# Patient Record
Sex: FEMALE | Race: WHITE | Employment: OTHER | URBAN - METROPOLITAN AREA
[De-identification: names, ages, dates, MRNs, and addresses within clinical notes are randomized per-mention and may not be internally consistent; named-entity substitution may affect disease eponyms.]

---

## 2017-01-09 ENCOUNTER — TELEPHONE (OUTPATIENT)
Dept: FAMILY MEDICINE CLINIC | Age: 62
End: 2017-01-09

## 2017-03-08 RX ORDER — VENLAFAXINE HYDROCHLORIDE 150 MG/1
CAPSULE, EXTENDED RELEASE ORAL
Qty: 180 CAPSULE | Refills: 1 | Status: SHIPPED | OUTPATIENT
Start: 2017-03-08 | End: 2017-09-27 | Stop reason: SDUPTHER

## 2017-04-06 RX ORDER — ATENOLOL 50 MG/1
TABLET ORAL
Qty: 135 TABLET | Refills: 1 | Status: SHIPPED | OUTPATIENT
Start: 2017-04-06 | End: 2017-09-27 | Stop reason: SDUPTHER

## 2017-04-06 RX ORDER — METFORMIN HYDROCHLORIDE 500 MG/1
TABLET, EXTENDED RELEASE ORAL
Qty: 360 TABLET | Refills: 1 | Status: SHIPPED | OUTPATIENT
Start: 2017-04-06 | End: 2017-09-27 | Stop reason: SDUPTHER

## 2017-04-12 LAB — FASTING: YES

## 2017-05-03 ENCOUNTER — INITIAL CONSULT (OUTPATIENT)
Dept: PULMONOLOGY | Age: 62
End: 2017-05-03

## 2017-05-03 VITALS
SYSTOLIC BLOOD PRESSURE: 122 MMHG | OXYGEN SATURATION: 97 % | DIASTOLIC BLOOD PRESSURE: 82 MMHG | HEART RATE: 76 BPM | BODY MASS INDEX: 25.91 KG/M2 | WEIGHT: 171 LBS | HEIGHT: 68 IN

## 2017-05-03 DIAGNOSIS — G47.419 PRIMARY NARCOLEPSY WITHOUT CATAPLEXY: Primary | ICD-10-CM

## 2017-05-03 PROCEDURE — 99203 OFFICE O/P NEW LOW 30 MIN: CPT | Performed by: INTERNAL MEDICINE

## 2017-05-03 RX ORDER — MODAFINIL 200 MG/1
200 TABLET ORAL 2 TIMES DAILY
Qty: 90 TABLET | Refills: 1 | Status: SHIPPED | OUTPATIENT
Start: 2017-05-03 | End: 2018-03-12

## 2017-05-03 RX ORDER — LEVALBUTEROL TARTRATE 45 UG/1
1-2 AEROSOL, METERED ORAL EVERY 4 HOURS PRN
COMMUNITY
End: 2019-07-19 | Stop reason: ALTCHOICE

## 2017-05-03 RX ORDER — MONTELUKAST SODIUM 10 MG/1
10 TABLET ORAL NIGHTLY
COMMUNITY
End: 2021-06-01 | Stop reason: SDUPTHER

## 2017-07-17 ENCOUNTER — HOSPITAL ENCOUNTER (OUTPATIENT)
Dept: PHYSICAL THERAPY | Age: 62
Setting detail: THERAPIES SERIES
Discharge: HOME | End: 2017-07-17
Payer: COMMERCIAL

## 2017-07-17 DIAGNOSIS — I10 ESSENTIAL (PRIMARY) HYPERTENSION: ICD-10-CM

## 2017-07-17 PROCEDURE — 97112 NEUROMUSCULAR REEDUCATION: CPT

## 2017-07-17 PROCEDURE — 97110 THERAPEUTIC EXERCISES: CPT

## 2017-07-17 ASSESSMENT — PAIN DESCRIPTION - ORIENTATION: ORIENTATION: LEFT

## 2017-07-17 ASSESSMENT — PAIN DESCRIPTION - LOCATION: LOCATION: ANKLE

## 2017-07-17 ASSESSMENT — PAIN DESCRIPTION - PAIN TYPE: TYPE: CHRONIC PAIN

## 2017-07-17 ASSESSMENT — PAIN SCALES - GENERAL: PAINLEVEL_OUTOF10: 3

## 2017-07-19 ENCOUNTER — HOSPITAL ENCOUNTER (OUTPATIENT)
Dept: PHYSICAL THERAPY | Age: 62
Setting detail: THERAPIES SERIES
Discharge: HOME | End: 2017-07-19
Payer: COMMERCIAL

## 2017-07-19 DIAGNOSIS — I10 ESSENTIAL (PRIMARY) HYPERTENSION: ICD-10-CM

## 2017-07-19 PROCEDURE — 97110 THERAPEUTIC EXERCISES: CPT

## 2017-07-25 ENCOUNTER — HOSPITAL ENCOUNTER (OUTPATIENT)
Dept: PHYSICAL THERAPY | Age: 62
Setting detail: THERAPIES SERIES
Discharge: HOME | End: 2017-07-25
Payer: COMMERCIAL

## 2017-07-25 DIAGNOSIS — I10 ESSENTIAL (PRIMARY) HYPERTENSION: ICD-10-CM

## 2017-07-25 PROCEDURE — 97110 THERAPEUTIC EXERCISES: CPT

## 2017-08-14 ENCOUNTER — HOSPITAL ENCOUNTER (OUTPATIENT)
Dept: PHYSICAL THERAPY | Age: 62
Setting detail: THERAPIES SERIES
Discharge: HOME | End: 2017-08-14
Payer: COMMERCIAL

## 2017-08-14 ENCOUNTER — APPOINTMENT (OUTPATIENT)
Dept: PHYSICAL THERAPY | Age: 62
End: 2017-08-14
Payer: COMMERCIAL

## 2017-08-14 PROCEDURE — 97110 THERAPEUTIC EXERCISES: CPT

## 2017-08-18 ENCOUNTER — APPOINTMENT (OUTPATIENT)
Dept: PHYSICAL THERAPY | Age: 62
End: 2017-08-18
Payer: COMMERCIAL

## 2017-09-27 ENCOUNTER — OFFICE VISIT (OUTPATIENT)
Dept: FAMILY MEDICINE CLINIC | Age: 62
End: 2017-09-27
Payer: COMMERCIAL

## 2017-09-27 VITALS
WEIGHT: 175.4 LBS | SYSTOLIC BLOOD PRESSURE: 125 MMHG | BODY MASS INDEX: 26.58 KG/M2 | HEART RATE: 72 BPM | OXYGEN SATURATION: 97 % | TEMPERATURE: 97.9 F | DIASTOLIC BLOOD PRESSURE: 74 MMHG | HEIGHT: 68 IN

## 2017-09-27 DIAGNOSIS — L25.9 CONTACT DERMATITIS, UNSPECIFIED CONTACT DERMATITIS TYPE, UNSPECIFIED TRIGGER: ICD-10-CM

## 2017-09-27 DIAGNOSIS — M79.7 FIBROMYALGIA: ICD-10-CM

## 2017-09-27 DIAGNOSIS — K21.9 GASTROESOPHAGEAL REFLUX DISEASE WITHOUT ESOPHAGITIS: ICD-10-CM

## 2017-09-27 DIAGNOSIS — J30.1 ALLERGIC RHINITIS DUE TO POLLEN, UNSPECIFIED RHINITIS SEASONALITY: ICD-10-CM

## 2017-09-27 DIAGNOSIS — F33.42 RECURRENT MAJOR DEPRESSIVE DISORDER, IN FULL REMISSION (HCC): Primary | ICD-10-CM

## 2017-09-27 DIAGNOSIS — I10 ESSENTIAL HYPERTENSION: ICD-10-CM

## 2017-09-27 DIAGNOSIS — E88.81 METABOLIC SYNDROME: ICD-10-CM

## 2017-09-27 LAB — HBA1C MFR BLD: NORMAL %

## 2017-09-27 PROCEDURE — 99214 OFFICE O/P EST MOD 30 MIN: CPT | Performed by: NURSE PRACTITIONER

## 2017-09-27 PROCEDURE — 83036 HEMOGLOBIN GLYCOSYLATED A1C: CPT | Performed by: NURSE PRACTITIONER

## 2017-09-27 RX ORDER — FLUTICASONE PROPIONATE 50 MCG
1 SPRAY, SUSPENSION (ML) NASAL DAILY
Qty: 3 BOTTLE | Refills: 1 | Status: SHIPPED | OUTPATIENT
Start: 2017-09-27 | End: 2018-09-17 | Stop reason: SDUPTHER

## 2017-09-27 RX ORDER — VENLAFAXINE HYDROCHLORIDE 150 MG/1
CAPSULE, EXTENDED RELEASE ORAL
Qty: 180 CAPSULE | Refills: 1 | Status: SHIPPED | OUTPATIENT
Start: 2017-09-27 | End: 2018-03-23 | Stop reason: SDUPTHER

## 2017-09-27 RX ORDER — TRIAMCINOLONE ACETONIDE 0.25 MG/ML
LOTION TOPICAL 2 TIMES DAILY
Qty: 1 BOTTLE | Refills: 0 | Status: SHIPPED | OUTPATIENT
Start: 2017-09-27 | End: 2017-10-23 | Stop reason: SDUPTHER

## 2017-09-27 RX ORDER — ATENOLOL 50 MG/1
TABLET ORAL
Qty: 135 TABLET | Refills: 1 | Status: SHIPPED | OUTPATIENT
Start: 2017-09-27 | End: 2018-03-23 | Stop reason: SDUPTHER

## 2017-09-27 RX ORDER — METFORMIN HYDROCHLORIDE 500 MG/1
TABLET, EXTENDED RELEASE ORAL
Qty: 360 TABLET | Refills: 1 | Status: SHIPPED | OUTPATIENT
Start: 2017-09-27 | End: 2018-03-23 | Stop reason: SDUPTHER

## 2017-09-27 RX ORDER — TRAMADOL HYDROCHLORIDE 50 MG/1
50 TABLET ORAL EVERY 8 HOURS PRN
Qty: 90 TABLET | Refills: 1 | Status: SHIPPED | OUTPATIENT
Start: 2017-09-27 | End: 2018-03-28 | Stop reason: SDUPTHER

## 2017-09-27 RX ORDER — OMEPRAZOLE 20 MG/1
40 CAPSULE, DELAYED RELEASE ORAL 2 TIMES DAILY
Qty: 60 CAPSULE | Refills: 1 | Status: SHIPPED | OUTPATIENT
Start: 2017-09-27 | End: 2018-03-28 | Stop reason: SDUPTHER

## 2017-09-27 RX ORDER — TIZANIDINE 4 MG/1
TABLET ORAL
Qty: 90 TABLET | Refills: 1 | Status: SHIPPED | OUTPATIENT
Start: 2017-09-27 | End: 2019-01-23 | Stop reason: SDUPTHER

## 2017-09-27 ASSESSMENT — ENCOUNTER SYMPTOMS
SHORTNESS OF BREATH: 0
CONSTIPATION: 0
EYE REDNESS: 0
RHINORRHEA: 0
ANAL BLEEDING: 0
NAUSEA: 0
COLOR CHANGE: 0
ABDOMINAL DISTENTION: 0
ABDOMINAL PAIN: 0
COUGH: 0
SORE THROAT: 0
BLOOD IN STOOL: 0
SINUS PRESSURE: 1
DIARRHEA: 0
EYE DISCHARGE: 0

## 2017-10-23 DIAGNOSIS — L25.9 CONTACT DERMATITIS, UNSPECIFIED CONTACT DERMATITIS TYPE, UNSPECIFIED TRIGGER: ICD-10-CM

## 2017-10-24 RX ORDER — TRIAMCINOLONE ACETONIDE 0.25 MG/ML
LOTION TOPICAL
Qty: 60 ML | Refills: 0 | Status: SHIPPED | OUTPATIENT
Start: 2017-10-24 | End: 2018-05-15 | Stop reason: ALTCHOICE

## 2017-11-03 ENCOUNTER — OFFICE VISIT (OUTPATIENT)
Dept: PULMONOLOGY | Age: 62
End: 2017-11-03
Payer: COMMERCIAL

## 2017-11-03 VITALS
RESPIRATION RATE: 16 BRPM | OXYGEN SATURATION: 97 % | WEIGHT: 179 LBS | HEART RATE: 86 BPM | BODY MASS INDEX: 27.13 KG/M2 | SYSTOLIC BLOOD PRESSURE: 114 MMHG | DIASTOLIC BLOOD PRESSURE: 74 MMHG

## 2017-11-03 DIAGNOSIS — G47.10 HYPERSOMNIA: Primary | ICD-10-CM

## 2017-11-03 DIAGNOSIS — G47.419 PRIMARY NARCOLEPSY WITHOUT CATAPLEXY: ICD-10-CM

## 2017-11-03 PROCEDURE — 99213 OFFICE O/P EST LOW 20 MIN: CPT | Performed by: INTERNAL MEDICINE

## 2017-11-03 NOTE — PROGRESS NOTES
Sleep Medicine clinic follow up note  [facility] Patient: Mena Owens  : 1955                                          Chief Complaint: Thad Friend is here today for a 6 mth f/u for narcolepsy.      [place]: Mena Owens is a 65 y.o. oldfemale came for follow up regarding her Narcolepsy with out cataplexy. She is currently on treatment with Provigil 200mg po BID. She denies any side effects with Provigil. She is still feels sleepy at her work especially in the afternoon with a strong urge to go to sleep. She is having difficulty staying awake at her work some time. If she is at home, she is taking naps for 1 to 2hours. She feels refreshed with short naps. But she feels sleepy if her naps are too long. She usually goes to bed at 11:00 PM and wakes up at  6:00 AM. Over the weekends her sleep schedule remain phase delayed. She admits to takes naps 2 to 3 times a week for 1 to 2hours. She quit taking Zanaflex on daily basis. She takes Zanaflex very rarely. She underwent base line PSG followed by MSLT test at [facility] sleep center on 4/13/15 and 4/14/15 respectively. She was diagnosed with Narcolepsy with out Cataplexy. She was started on Nuvigil first. How ever due to her insurance denial she was started on adderall. Patient developed palpitations with Adderall. Her Adderall was changed to Provigil 200mg PO bid. She take Provigil a tablet at 7Am and second one between 1 to 2 pm. She denies any side effects with Provigil and tolerating well. She lives alone at home. She denies history of choking and gasping sensation at night time. She admits to headaches in the morning. She denies dry mouth in the morning. She denies palpitations during night time or during nocturnal awakenings. Shedenies sweating during nocturnal awakenings. No family history of obstructive sleep apnea or Narcolepsy.        Review of Systems General/Constitutional: she gained 8 lbs of weight from the last visit with normal appetite. No fever or chills. HENT: Negative. Eyes: Negative. Upper respiratory tract: Frequent nasal stuffiness with no post nasal drip. She is using Flonase nasal spray. Lower respiratory tract/ lungs: No cough or sputum production. No hemoptysis. Cardiovascular: No palpitations or chest pain. Gastrointestinal: No nausea or vomiting. Neurological: No focal neurologiacal weakness. Extremities: No edema. Musculoskeletal: No complaints. Genitourinary: No complaints. Hematological: Negative. Psychiatric/Behavioral: Negative. Skin: No itching.            Past Medical History:   Diagnosis Date    Asthma     Dr. Leona De Santiago Depression       from [facility] neck    Fibromyalgia    [facility] Hyperlipemia     check per Dr Becky Perez Hyperlipidemia 2006    Insulin resistance     Narcolepsy 2000    per Dr. Zabrina Chakraborty via sleep study    Neck pain     cervical stenosis    Prolonged emergence from general anesthesia     Tachycardia        Past Surgical History:   Procedure Laterality Date    ANKLE SURGERY Left 2017    [facility]    CARPAL TUNNEL RELEASE Right 2011    CERVICAL DISCECTOMY  2012    C3-4, C4-5    COLONOSCOPY  2006    CORONARY ANGIOPLASTY WITH STENT PLACEMENT  2/12/15    Dr. Kati Sanchez  2015    FOOT SURGERY Right 2011    bone removal from foot on rt    FOOT SURGERY Left 2011    bone spur, tarsal tunnel and cyst removal on left    HYSTERECTOMY  1996    total, endometriosis    JOINT REPLACEMENT  2009    bilateral knee    LAPAROSCOPY      for endometriosis    TONSILLECTOMY AND ADENOIDECTOMY  2006    UPPER GASTROINTESTINAL ENDOSCOPY  2006       Allergies   Allergen Reactions    Bactrim Other (See Comments)     Metallic taste    Iv Dye [Iodides] Nausea Only and Other (See Comments)     Says older IVP dye causes Stomach pains    Tolectin [Tolmetin Sodium] Other (See Comments)     Metallic taste    Claforan [Cefotaxime Sodium In D5w] Hives and Rash    Pcn [Penicillins] Hives and Rash       Current Outpatient Prescriptions   Medication Sig Dispense Refill    triamcinolone (KENALOG) 0.025 % LOTN lotion APPLY TOPICALLY TWO TIMES A DAY 60 mL 0    atenolol (TENORMIN) 50 MG tablet TAKE ONE & ONE-HALF TABLETS BY MOUTH ONCE DAILY 135 tablet 1    fluticasone (FLONASE) 50 MCG/ACT nasal spray 1 spray by Nasal route daily 3 Bottle 1    metFORMIN (GLUCOPHAGE-XR) 500 MG extended release tablet TAKE FOUR TABLETS BY MOUTH ONCE DAILY IN THE EVENING 360 tablet 1    omeprazole (PRILOSEC) 20 MG delayed release capsule Take 2 capsules by mouth 2 times daily 60 capsule 1    tiZANidine (ZANAFLEX) 4 MG tablet Take 1 PO QHS as needed 90 tablet 1    traMADol (ULTRAM) 50 MG tablet Take 1 tablet by mouth every 8 hours as needed for Pain 90 tablet 1    venlafaxine (EFFEXOR XR) 150 MG extended release capsule TAKE ONE CAPSULE BY MOUTH TWICE A  capsule 1    montelukast (SINGULAIR) 10 MG tablet Take 10 mg by mouth nightly      levalbuterol (XOPENEX HFA) 45 MCG/ACT inhaler Inhale 1-2 puffs into the lungs every 4 hours as needed for Wheezing      modafinil (PROVIGIL) 200 MG tablet Take 1 tablet by mouth 2 times daily 90 tablet 1    aspirin 325 MG tablet Take 325 mg by mouth daily      atorvastatin (LIPITOR) 80 MG tablet Take 1 tablet by mouth daily. 30 tablet 3    nitroGLYCERIN (NITROSTAT) 0.4 MG SL tablet Place 1 tablet under the tongue every 5 minutes as needed for Chest pain. 25 tablet 3    losartan (COZAAR) 25 MG tablet Take 1 tablet by mouth daily. 30 tablet 3    Multiple Vitamins-Minerals (THERAPEUTIC MULTIVITAMIN-MINERALS) tablet Take 1 tablet by mouth daily.  Coenzyme Q10 (CO Q 10) 10 MG CAPS Take 1 capsule by mouth daily.  COCONUT OIL PO Take 2 capsules by mouth daily.       Mometasone Furo-Formoterol Fum (DULERA IN) Inhale  into the lungs 2 times daily.      Calcium Carbonate-Vitamin D (CALCIUM + D PO) Take 1,200 mg by mouth daily.  LYSINE by Does not apply route daily        No current facility-administered medications for this visit. Family History   Problem Relation Age of Onset    COPD Father     Heart Disease Father 61     ekg changes    Cancer Sister 50     Multiple malaran     Irritable Bowel Syndrome Sister     Arthritis Mother 48    COPD Maternal Aunt     Severe Sprains Maternal Uncle      epilepsy     Cancer Paternal Uncle 58     lung    Diabetes Maternal Grandmother     Kidney Disease Maternal Grandmother 72     Failure form DM     Cancer Maternal Grandfather      Mets all over     Coronary Art Dis Paternal Grandfather 35    Heart Disease Paternal Grandfather 35    Heart Attack Paternal Grandfather 35    Cancer Maternal Uncle      Lung     Stroke Maternal Uncle     Diabetes Maternal Uncle      NIDDM         /74   Pulse 86   Resp 16   Wt 179 lb (81.2 kg)   SpO2 97% Comment: R/A at rest  BMI 27.13 kg/m²   Mallampati airway Class:III  Neck Circumference: 14 Inche  [place] sleepiness score 11/3/17: 18    Physical Exam   Nursing note and vitals reviewed. Constitutional: Patient appears moderately built and moderately nourished. No distress. Patient is oriented to person, place, and time. HENT:   Head: Normocephalic and atraumatic. Right Ear: External ear normal.   Left Ear: External ear normal.   Mouth/Throat: Oropharynx is clear and moist.  No oral thrush. Eyes: Conjunctivae are normal. Pupils are equal, round, and reactive to light. No scleral icterus. Neck: Neck supple. No JVD present. No tracheal deviation present. Cardiovascular: Normal rate, regular rhythm, normal heart sounds. No murmur heard. Pulmonary/Chest: Effort normal and breath sounds normal. No stridor. No respiratory distress. No wheezes. No rales. Patient exhibits no tenderness. Abdominal: Soft. Patient exhibits no distension.

## 2017-11-03 NOTE — PROGRESS NOTES
Chief Complaint: Thad Friend is here today for a 6 mth f/u for narcolepsy.      Mallampati airway Class:III  Neck Circumference: 14 Inches    [place] sleepiness score 11/3/17: 18

## 2017-11-19 ENCOUNTER — HOSPITAL ENCOUNTER (OUTPATIENT)
Dept: SLEEP CENTER | Age: 62
Discharge: HOME | End: 2017-11-19
Payer: COMMERCIAL

## 2017-11-19 DIAGNOSIS — G47.10 HYPERSOMNIA: ICD-10-CM

## 2017-11-19 PROCEDURE — 95810 POLYSOM 6/> YRS 4/> PARAM: CPT

## 2017-11-21 LAB — STATUS: NORMAL

## 2017-11-22 NOTE — PROGRESS NOTES
[address] [place] [address]                                SLEEP STUDY REPORT    PATIENT NAME: Blas Essex              :        1955  MED REC NO:   [id number]                           ROOM:  ACCOUNT NO:   [de-identified]                           ADMIT DATE: 2017  PROVIDER:     Sydney Sanchez MD      DATE OF STUDY:  2017    REFERRING PROVIDER:  Sydney Sanchez M.D. The patient's height is 68 inches, weight is 179 pounds with a BMI of 27.5. HISTORY:  The patient is a 41-year-old female, who was recently evaluated  by me on 2017. The patient diagnosed with narcolepsy in the past.   Currently on treatment with Provigil 200 mg p.o. b.i.d. The patient gained  7 pounds of weight from her last sleep study. The patient still found to  have hypersomnia with an [place] Sleepiness Score of 18. The patient is  scheduled for a baseline polysomnogram to check the current status of sleep  apnea as an etiology for hypersomnia. The patient had associated  comorbidities including moderate bronchial asthma, depression, and  fibromyalgia. METHODS:  The patient underwent digital polysomnography in compliance with  the standards and specifications from the AASM Manual including the  simultaneous recording of 3 EEG channels (F4-M1, C4-M1, and O2-M1 with back  up electrodes F3-M2, C3-M2, and O1-M2), 2 EOG channels (E1-M2, and E2-M1,),  EMG (chin, left & right leg), EKG, Nonin pulse oximetry with  less than 2  second averaging time, body position, airflow recorded by oral-nasal  thermal sensor and nasal air pressure transducer, plus respiratory effort  recorded by calibrated respiratory inductance plethysmography (RIP), flow  volume loop, sound and video.   Sleep staging and scoring followed the  standard put forth by the American Academy of Sleep Medicine and utilized  the 4A obstructive hypopnea than 88%. EKG MONITORING:  Revealed normal sinus rhythm. The patient was found to have a mild-to-moderate snoring during the sleep  study. IMPRESSION:  1. The study is consistent with no clinically significant obstructive  sleep apnea; however, the patient did not have any REM sleep during the  study limiting the interpretation. 2.  Significant periodic limb movements with associated arousals. 3.  Depression. 4.  Fibromyalgia. 5.  Cervical stenosis. 6.  Narcolepsy without cataplexy, was diagnosed by Dr. Aquiles Walsh at [facility]. Current plan of treatment with Provigil 200 mg b.i.d. RECOMMENDATIONS:  The patient should be scheduled for followup with my  clinic as soon as possible to discuss about the sleep study findings for  further management.         Luis Enrique Laguna MD    D: 11/21/2017 20:25:30       T: 11/22/2017 5:52:00     [email or web address]  Job#: [id number]     Doc#: [id number]    CC:

## 2017-11-28 ENCOUNTER — OFFICE VISIT (OUTPATIENT)
Dept: PULMONOLOGY | Age: 62
End: 2017-11-28
Payer: COMMERCIAL

## 2017-11-28 VITALS
SYSTOLIC BLOOD PRESSURE: 124 MMHG | OXYGEN SATURATION: 96 % | WEIGHT: 177.8 LBS | HEIGHT: 68 IN | DIASTOLIC BLOOD PRESSURE: 78 MMHG | BODY MASS INDEX: 26.95 KG/M2 | HEART RATE: 71 BPM

## 2017-11-28 DIAGNOSIS — G47.419 PRIMARY NARCOLEPSY WITHOUT CATAPLEXY: Primary | ICD-10-CM

## 2017-11-28 DIAGNOSIS — G47.10 HYPERSOMNIA: ICD-10-CM

## 2017-11-28 DIAGNOSIS — G47.61 PLMD (PERIODIC LIMB MOVEMENT DISORDER): ICD-10-CM

## 2017-11-28 DIAGNOSIS — J01.10 ACUTE NON-RECURRENT FRONTAL SINUSITIS: ICD-10-CM

## 2017-11-28 PROCEDURE — 99214 OFFICE O/P EST MOD 30 MIN: CPT | Performed by: PHYSICIAN ASSISTANT

## 2017-11-28 RX ORDER — AZITHROMYCIN 250 MG/1
TABLET, FILM COATED ORAL
Qty: 1 PACKET | Refills: 0 | Status: SHIPPED | OUTPATIENT
Start: 2017-11-28 | End: 2017-12-08

## 2017-11-28 NOTE — PROGRESS NOTES
has a normal mood and affect. Assessment  1. Primary narcolepsy without cataplexy     2.  PLMD (periodic limb movement disorder)        Recommendations  Will start on Xyrem  She denies ETOH or drug abuse  Continue Provigil with Xyrem  Zpack for sinusitis  If no improvement in PLM, will consider treating also  Follow up 3 months    Tamy Hammond PA-C, MPAS  11/28/2017

## 2018-03-12 ENCOUNTER — TELEPHONE (OUTPATIENT)
Dept: PULMONOLOGY | Age: 63
End: 2018-03-12

## 2018-03-12 ENCOUNTER — OFFICE VISIT (OUTPATIENT)
Dept: PULMONOLOGY | Age: 63
End: 2018-03-12
Payer: COMMERCIAL

## 2018-03-12 VITALS
SYSTOLIC BLOOD PRESSURE: 108 MMHG | BODY MASS INDEX: 27.1 KG/M2 | WEIGHT: 178.8 LBS | OXYGEN SATURATION: 97 % | DIASTOLIC BLOOD PRESSURE: 68 MMHG | HEART RATE: 80 BPM | HEIGHT: 68 IN

## 2018-03-12 DIAGNOSIS — G47.419 PRIMARY NARCOLEPSY WITHOUT CATAPLEXY: Primary | ICD-10-CM

## 2018-03-12 PROCEDURE — 99213 OFFICE O/P EST LOW 20 MIN: CPT | Performed by: PHYSICIAN ASSISTANT

## 2018-03-12 RX ORDER — ARMODAFINIL 250 MG/1
250 TABLET ORAL ONCE
Qty: 30 TABLET | Refills: 0 | Status: SHIPPED | OUTPATIENT
Start: 2018-03-12 | End: 2018-03-12 | Stop reason: SDUPTHER

## 2018-03-12 RX ORDER — ARMODAFINIL 250 MG/1
250 TABLET ORAL DAILY
Qty: 30 TABLET | Refills: 0 | Status: SHIPPED | OUTPATIENT
Start: 2018-03-12 | End: 2018-04-13 | Stop reason: SDUPTHER

## 2018-03-23 DIAGNOSIS — E88.81 METABOLIC SYNDROME: ICD-10-CM

## 2018-03-23 DIAGNOSIS — F33.42 RECURRENT MAJOR DEPRESSIVE DISORDER, IN FULL REMISSION (HCC): ICD-10-CM

## 2018-03-23 DIAGNOSIS — I10 ESSENTIAL HYPERTENSION: ICD-10-CM

## 2018-03-23 RX ORDER — VENLAFAXINE HYDROCHLORIDE 150 MG/1
CAPSULE, EXTENDED RELEASE ORAL
Qty: 180 CAPSULE | Refills: 1 | Status: SHIPPED | OUTPATIENT
Start: 2018-03-23 | End: 2018-09-17 | Stop reason: SDUPTHER

## 2018-03-23 RX ORDER — METFORMIN HYDROCHLORIDE 500 MG/1
TABLET, EXTENDED RELEASE ORAL
Qty: 360 TABLET | Refills: 1 | Status: SHIPPED | OUTPATIENT
Start: 2018-03-23 | End: 2018-03-28 | Stop reason: SDUPTHER

## 2018-03-23 RX ORDER — ATENOLOL 50 MG/1
TABLET ORAL
Qty: 135 TABLET | Refills: 1 | Status: SHIPPED | OUTPATIENT
Start: 2018-03-23 | End: 2018-03-28 | Stop reason: SDUPTHER

## 2018-03-28 ENCOUNTER — OFFICE VISIT (OUTPATIENT)
Dept: FAMILY MEDICINE CLINIC | Age: 63
End: 2018-03-28
Payer: COMMERCIAL

## 2018-03-28 VITALS
HEART RATE: 83 BPM | RESPIRATION RATE: 12 BRPM | WEIGHT: 177 LBS | BODY MASS INDEX: 26.83 KG/M2 | HEIGHT: 68 IN | OXYGEN SATURATION: 92 % | SYSTOLIC BLOOD PRESSURE: 118 MMHG | DIASTOLIC BLOOD PRESSURE: 82 MMHG

## 2018-03-28 DIAGNOSIS — E88.81 METABOLIC SYNDROME: ICD-10-CM

## 2018-03-28 DIAGNOSIS — E78.00 HYPERCHOLESTEROLEMIA: ICD-10-CM

## 2018-03-28 DIAGNOSIS — M79.7 FIBROMYALGIA: ICD-10-CM

## 2018-03-28 DIAGNOSIS — K21.9 GASTROESOPHAGEAL REFLUX DISEASE WITHOUT ESOPHAGITIS: ICD-10-CM

## 2018-03-28 DIAGNOSIS — I10 ESSENTIAL HYPERTENSION: ICD-10-CM

## 2018-03-28 DIAGNOSIS — G43.009 MIGRAINE WITHOUT AURA AND WITHOUT STATUS MIGRAINOSUS, NOT INTRACTABLE: Primary | ICD-10-CM

## 2018-03-28 LAB — HBA1C MFR BLD: 6.1 %

## 2018-03-28 PROCEDURE — 83036 HEMOGLOBIN GLYCOSYLATED A1C: CPT | Performed by: FAMILY MEDICINE

## 2018-03-28 PROCEDURE — 99214 OFFICE O/P EST MOD 30 MIN: CPT | Performed by: FAMILY MEDICINE

## 2018-03-28 RX ORDER — TOPIRAMATE 25 MG/1
TABLET ORAL
Qty: 60 TABLET | Refills: 1 | Status: SHIPPED | OUTPATIENT
Start: 2018-03-28 | End: 2018-03-28 | Stop reason: CLARIF

## 2018-03-28 RX ORDER — ATENOLOL 50 MG/1
TABLET ORAL
Qty: 135 TABLET | Refills: 1 | Status: SHIPPED | OUTPATIENT
Start: 2018-03-28 | End: 2019-01-23 | Stop reason: SDUPTHER

## 2018-03-28 RX ORDER — OMEPRAZOLE 20 MG/1
40 CAPSULE, DELAYED RELEASE ORAL DAILY
Qty: 180 CAPSULE | Refills: 1 | Status: SHIPPED | OUTPATIENT
Start: 2018-03-28 | End: 2018-03-28 | Stop reason: CLARIF

## 2018-03-28 RX ORDER — OMEPRAZOLE 20 MG/1
CAPSULE, DELAYED RELEASE ORAL
Qty: 180 CAPSULE | Refills: 1 | Status: SHIPPED | OUTPATIENT
Start: 2018-03-28 | End: 2018-09-17 | Stop reason: SDUPTHER

## 2018-03-28 RX ORDER — METFORMIN HYDROCHLORIDE 500 MG/1
TABLET, EXTENDED RELEASE ORAL
Qty: 360 TABLET | Refills: 1 | Status: SHIPPED | OUTPATIENT
Start: 2018-03-28 | End: 2018-09-17 | Stop reason: SDUPTHER

## 2018-03-28 RX ORDER — METFORMIN HYDROCHLORIDE 500 MG/1
1000 TABLET, EXTENDED RELEASE ORAL 2 TIMES DAILY
Qty: 360 TABLET | Refills: 1 | Status: SHIPPED | OUTPATIENT
Start: 2018-03-28 | End: 2018-03-28 | Stop reason: CLARIF

## 2018-03-28 RX ORDER — ATENOLOL 50 MG/1
75 TABLET ORAL DAILY
Qty: 135 TABLET | Refills: 1 | Status: SHIPPED | OUTPATIENT
Start: 2018-03-28 | End: 2018-03-28 | Stop reason: CLARIF

## 2018-03-28 RX ORDER — TOPIRAMATE 25 MG/1
TABLET ORAL
Qty: 60 TABLET | Refills: 1 | Status: SHIPPED | OUTPATIENT
Start: 2018-03-28 | End: 2018-05-15 | Stop reason: SDUPTHER

## 2018-03-28 RX ORDER — TRAMADOL HYDROCHLORIDE 50 MG/1
50 TABLET ORAL EVERY 8 HOURS PRN
Qty: 90 TABLET | Refills: 1 | Status: SHIPPED | OUTPATIENT
Start: 2018-03-28 | End: 2018-06-26

## 2018-03-28 ASSESSMENT — PATIENT HEALTH QUESTIONNAIRE - PHQ9
SUM OF ALL RESPONSES TO PHQ QUESTIONS 1-9: 0
1. LITTLE INTEREST OR PLEASURE IN DOING THINGS: 0
2. FEELING DOWN, DEPRESSED OR HOPELESS: 0
SUM OF ALL RESPONSES TO PHQ9 QUESTIONS 1 & 2: 0

## 2018-03-28 NOTE — PROGRESS NOTES
symptoms. She was started on it for fibromyalgia. HTN:  She is taking Cozaar 25 mg 1 tablet PO daily, Tenormin 50 mg 1 1/2 tablet PO daily. She tries to follow low cholesterol diet. She had a stent placed in in 2015. She sees Dr. Armando Chavira. Metabolic Syndrome:  She is taking Glucophage  mg 4 tablets PO daily and denies any side effects from the medicine. She is trying to eat low cholesterol, low sodium diet. FMG: She is taking Effexor  mg 2 PO daily, Zanaflex  4 mg PO as needed (average 2 per week) and Tramadol as needed (last bottle lasted 6 months). Asthma:  She sees the allergist.  She also been diagnosed with allergic rhinitis even with her negative allergy test.   She is taking Singulair, Dulera and Flonase. Also Zyrtec PRN. Narcolepsy: she was diagnosed in . Her physician is Dr. Arabella Banks. She underwent base line PSG followed by MSLT test at [facility] on 4/13/15 and 4/14/15 respectively. She was diagnosed with Narcolepsy with out Cataplexy. Last visit at the [address] she was switched to [facility].     has a current medication list which includes the following prescription(s): topiramate, metformin, atenolol, omeprazole, tramadol, venlafaxine, armodafinil, triamcinolone, fluticasone, tizanidine, montelukast, levalbuterol, aspirin, atorvastatin, nitroglycerin, losartan, therapeutic multivitamin-minerals, co q 10, coconut oil, mometasone furo-formoterol fum, calcium citrate-vitamin d, and lysine.     Allergies   Allergen Reactions    Bactrim Other (See Comments)     Metallic taste    Iv Dye [Iodides] Nausea Only and Other (See Comments)     Says older IVP dye causes Stomach pains    Tolectin [Tolmetin Sodium] Other (See Comments)     Metallic taste    Claforan [Cefotaxime Sodium In D5w] Hives and Rash    Pcn [Penicillins] Hives and Rash       Past Medical History:   Diagnosis Date    Asthma     Dr. Chen He Depression

## 2018-04-12 ENCOUNTER — TELEPHONE (OUTPATIENT)
Dept: PULMONOLOGY | Age: 63
End: 2018-04-12

## 2018-04-13 RX ORDER — ARMODAFINIL 250 MG/1
250 TABLET ORAL DAILY
Qty: 30 TABLET | Refills: 0 | Status: SHIPPED | OUTPATIENT
Start: 2018-04-13 | End: 2018-05-14

## 2018-04-17 ENCOUNTER — HOSPITAL ENCOUNTER (OUTPATIENT)
Age: 63
Discharge: HOME | End: 2018-04-17
Payer: COMMERCIAL

## 2018-04-17 LAB
ALBUMIN SERPL-MCNC: 4.4 G/DL (ref 3.5–5.1)
ALP BLD-CCNC: 97 U/L (ref 38–126)
ALT SERPL-CCNC: 18 U/L (ref 11–66)
ANION GAP SERPL CALCULATED.3IONS-SCNC: 14 MEQ/L (ref 8–16)
AST SERPL-CCNC: 19 U/L (ref 5–40)
BILIRUB SERPL-MCNC: 0.3 MG/DL (ref 0.3–1.2)
BILIRUBIN DIRECT: < 0.2 MG/DL (ref 0–0.3)
BUN BLDV-MCNC: 15 MG/DL (ref 7–22)
CALCIUM SERPL-MCNC: 9.8 MG/DL (ref 8.5–10.5)
CHLORIDE BLD-SCNC: 105 MEQ/L (ref 98–111)
CHOLESTEROL, TOTAL: 177 MG/DL (ref 100–199)
CO2: 22 MEQ/L (ref 23–33)
CREAT SERPL-MCNC: 0.9 MG/DL (ref 0.4–1.2)
FERRITIN: 17 NG/ML (ref 10–291)
GFR SERPL CREATININE-BSD FRML MDRD: 63 ML/MIN/1.73M2
GLUCOSE BLD-MCNC: 102 MG/DL (ref 70–108)
HCT VFR BLD CALC: 42 % (ref 37–47)
HDLC SERPL-MCNC: 46 MG/DL
HEMOGLOBIN: 13.6 GM/DL (ref 12–16)
LDL CHOLESTEROL CALCULATED: 86 MG/DL
MCH RBC QN AUTO: 27.5 PG (ref 27–31)
MCHC RBC AUTO-ENTMCNC: 32.5 GM/DL (ref 33–37)
MCV RBC AUTO: 84.5 FL (ref 81–99)
PDW BLD-RTO: 15.1 % (ref 11.5–14.5)
PLATELET # BLD: 434 THOU/MM3 (ref 130–400)
PMV BLD AUTO: 8 FL (ref 7.4–10.4)
POTASSIUM SERPL-SCNC: 4.5 MEQ/L (ref 3.5–5.2)
RBC # BLD: 4.96 MILL/MM3 (ref 4.2–5.4)
SODIUM BLD-SCNC: 141 MEQ/L (ref 135–145)
TOTAL PROTEIN: 7.3 G/DL (ref 6.1–8)
TRIGL SERPL-MCNC: 225 MG/DL (ref 0–199)
VITAMIN B-12: 1170 PG/ML (ref 211–911)
WBC # BLD: 8 THOU/MM3 (ref 4.8–10.8)

## 2018-04-17 PROCEDURE — 80053 COMPREHEN METABOLIC PANEL: CPT

## 2018-04-17 PROCEDURE — 82728 ASSAY OF FERRITIN: CPT

## 2018-04-17 PROCEDURE — 36415 COLL VENOUS BLD VENIPUNCTURE: CPT

## 2018-04-17 PROCEDURE — 85027 COMPLETE CBC AUTOMATED: CPT

## 2018-04-17 PROCEDURE — 82248 BILIRUBIN DIRECT: CPT

## 2018-04-17 PROCEDURE — 82607 VITAMIN B-12: CPT

## 2018-04-17 PROCEDURE — 80061 LIPID PANEL: CPT

## 2018-05-15 ENCOUNTER — OFFICE VISIT (OUTPATIENT)
Dept: FAMILY MEDICINE CLINIC | Age: 63
End: 2018-05-15
Payer: COMMERCIAL

## 2018-05-15 VITALS
SYSTOLIC BLOOD PRESSURE: 120 MMHG | OXYGEN SATURATION: 98 % | HEIGHT: 68 IN | RESPIRATION RATE: 12 BRPM | HEART RATE: 77 BPM | BODY MASS INDEX: 27.28 KG/M2 | WEIGHT: 180 LBS | TEMPERATURE: 98 F | DIASTOLIC BLOOD PRESSURE: 82 MMHG

## 2018-05-15 DIAGNOSIS — Z23 NEED FOR PROPHYLACTIC VACCINATION AND INOCULATION AGAINST VARICELLA: ICD-10-CM

## 2018-05-15 DIAGNOSIS — G43.009 MIGRAINE WITHOUT AURA AND WITHOUT STATUS MIGRAINOSUS, NOT INTRACTABLE: ICD-10-CM

## 2018-05-15 DIAGNOSIS — L81.9 CHANGE IN PIGMENTED SKIN LESION OF FACE: ICD-10-CM

## 2018-05-15 DIAGNOSIS — J30.2 ACUTE SEASONAL ALLERGIC RHINITIS DUE TO OTHER ALLERGEN: Primary | ICD-10-CM

## 2018-05-15 PROCEDURE — 96372 THER/PROPH/DIAG INJ SC/IM: CPT | Performed by: FAMILY MEDICINE

## 2018-05-15 PROCEDURE — 99214 OFFICE O/P EST MOD 30 MIN: CPT | Performed by: FAMILY MEDICINE

## 2018-05-15 RX ORDER — METHYLPREDNISOLONE ACETATE 40 MG/ML
40 INJECTION, SUSPENSION INTRA-ARTICULAR; INTRALESIONAL; INTRAMUSCULAR; SOFT TISSUE ONCE
Status: COMPLETED | OUTPATIENT
Start: 2018-05-15 | End: 2018-05-15

## 2018-05-15 RX ORDER — TOPIRAMATE 25 MG/1
TABLET ORAL
Qty: 60 TABLET | Refills: 5 | Status: SHIPPED | OUTPATIENT
Start: 2018-05-15 | End: 2018-08-17 | Stop reason: SDUPTHER

## 2018-05-15 RX ORDER — BETAMETHASONE SODIUM PHOSPHATE AND BETAMETHASONE ACETATE 3; 3 MG/ML; MG/ML
6 INJECTION, SUSPENSION INTRA-ARTICULAR; INTRALESIONAL; INTRAMUSCULAR; SOFT TISSUE ONCE
Status: COMPLETED | OUTPATIENT
Start: 2018-05-15 | End: 2018-05-15

## 2018-05-15 RX ORDER — METHYLPREDNISOLONE SODIUM SUCCINATE 40 MG/ML
40 INJECTION, POWDER, LYOPHILIZED, FOR SOLUTION INTRAMUSCULAR; INTRAVENOUS ONCE
Status: DISCONTINUED | OUTPATIENT
Start: 2018-05-15 | End: 2018-05-17

## 2018-05-15 RX ADMIN — BETAMETHASONE SODIUM PHOSPHATE AND BETAMETHASONE ACETATE 6 MG: 3; 3 INJECTION, SUSPENSION INTRA-ARTICULAR; INTRALESIONAL; INTRAMUSCULAR; SOFT TISSUE at 10:23

## 2018-05-15 RX ADMIN — METHYLPREDNISOLONE ACETATE 40 MG: 40 INJECTION, SUSPENSION INTRA-ARTICULAR; INTRALESIONAL; INTRAMUSCULAR; SOFT TISSUE at 10:28

## 2018-05-25 RX ORDER — ARMODAFINIL 250 MG/1
TABLET ORAL
Qty: 30 TABLET | Refills: 0 | Status: SHIPPED | OUTPATIENT
Start: 2018-05-25 | End: 2018-06-23 | Stop reason: SDUPTHER

## 2018-08-17 DIAGNOSIS — G43.009 MIGRAINE WITHOUT AURA AND WITHOUT STATUS MIGRAINOSUS, NOT INTRACTABLE: ICD-10-CM

## 2018-08-17 RX ORDER — TOPIRAMATE 25 MG/1
TABLET ORAL
Qty: 60 TABLET | Refills: 5 | Status: SHIPPED | OUTPATIENT
Start: 2018-08-17 | End: 2018-09-17 | Stop reason: SDUPTHER

## 2018-09-12 ENCOUNTER — OFFICE VISIT (OUTPATIENT)
Dept: PULMONOLOGY | Age: 63
End: 2018-09-12
Payer: COMMERCIAL

## 2018-09-12 VITALS
BODY MASS INDEX: 26.64 KG/M2 | OXYGEN SATURATION: 98 % | SYSTOLIC BLOOD PRESSURE: 134 MMHG | WEIGHT: 175.8 LBS | HEIGHT: 68 IN | DIASTOLIC BLOOD PRESSURE: 72 MMHG | HEART RATE: 71 BPM

## 2018-09-12 DIAGNOSIS — G47.419 PRIMARY NARCOLEPSY WITHOUT CATAPLEXY: Primary | ICD-10-CM

## 2018-09-12 DIAGNOSIS — G47.10 HYPERSOMNIA: ICD-10-CM

## 2018-09-12 PROCEDURE — 99213 OFFICE O/P EST LOW 20 MIN: CPT | Performed by: PHYSICIAN ASSISTANT

## 2018-09-12 RX ORDER — METHYLPHENIDATE HYDROCHLORIDE 10 MG/1
10 TABLET ORAL DAILY
Qty: 30 TABLET | Refills: 0 | Status: SHIPPED | OUTPATIENT
Start: 2018-09-12 | End: 2019-09-12 | Stop reason: SDUPTHER

## 2018-09-12 RX ORDER — ARMODAFINIL 250 MG/1
250 TABLET ORAL DAILY
Qty: 90 TABLET | Refills: 0 | Status: SHIPPED | OUTPATIENT
Start: 2018-09-12 | End: 2018-09-17 | Stop reason: SDUPTHER

## 2018-09-12 NOTE — PROGRESS NOTES
endometriosis    TONSILLECTOMY AND ADENOIDECTOMY  2006    UPPER GASTROINTESTINAL ENDOSCOPY  2006       Social History   Substance Use Topics    Smoking status: Never Smoker    Smokeless tobacco: Never Used    Alcohol use Yes      Comment: wine cooler 1-2 times a year       Allergies   Allergen Reactions    Bactrim Other (See Comments)     Metallic taste    Iv Dye [Iodides] Nausea Only and Other (See Comments)     Says older IVP dye causes Stomach pains    Tolectin [Tolmetin Sodium] Other (See Comments)     Metallic taste    Claforan [Cefotaxime Sodium In D5w] Hives and Rash    Pcn [Penicillins] Hives and Rash       Current Outpatient Prescriptions   Medication Sig Dispense Refill    Armodafinil 250 MG TABS Take 250 mg by mouth daily for 90 days. . 90 tablet 0    methylphenidate (RITALIN) 10 MG tablet Take 1 tablet by mouth daily for 30 days. Take at noon. 30 tablet 0    topiramate (TOPAMAX) 25 MG tablet Take 1 tab PO every night x 1 week, then one tab PO BID x 1 week, then 1 tab in am and 2 in pm x 1 week then 2 tabs PO BID 60 tablet 5    metFORMIN (GLUCOPHAGE XR) 500 MG extended release tablet Take 2 tabs by mouth 2 times daily 360 tablet 1    atenolol (TENORMIN) 50 MG tablet Take 1.5 tablets by mouth daily 135 tablet 1    omeprazole (PRILOSEC) 20 MG delayed release capsule Take 2 capsules by mouth daily 180 capsule 1    venlafaxine (EFFEXOR XR) 150 MG extended release capsule TAKE ONE CAPSULE BY MOUTH TWICE A  capsule 1    fluticasone (FLONASE) 50 MCG/ACT nasal spray 1 spray by Nasal route daily 3 Bottle 1    tiZANidine (ZANAFLEX) 4 MG tablet Take 1 PO QHS as needed 90 tablet 1    montelukast (SINGULAIR) 10 MG tablet Take 10 mg by mouth nightly      levalbuterol (XOPENEX HFA) 45 MCG/ACT inhaler Inhale 1-2 puffs into the lungs every 4 hours as needed for Wheezing      aspirin 325 MG tablet Take 325 mg by mouth daily      atorvastatin (LIPITOR) 80 MG tablet Take 1 tablet by mouth daily. sleep symptoms.   - She was instructed on weight loss  - Alessandra Lauren was educated about my impression and plan. Patient verbalizes understanding.   We will see Zheng Fitch back in: 1 year     Mariann Calderon [place]  9/12/2018

## 2018-09-17 ENCOUNTER — OFFICE VISIT (OUTPATIENT)
Dept: FAMILY MEDICINE CLINIC | Age: 63
End: 2018-09-17
Payer: COMMERCIAL

## 2018-09-17 VITALS
BODY MASS INDEX: 26.07 KG/M2 | SYSTOLIC BLOOD PRESSURE: 106 MMHG | HEIGHT: 68 IN | DIASTOLIC BLOOD PRESSURE: 64 MMHG | WEIGHT: 172 LBS | TEMPERATURE: 98.2 F | OXYGEN SATURATION: 98 % | HEART RATE: 81 BPM

## 2018-09-17 DIAGNOSIS — Z00.00 WELL ADULT EXAM: Primary | ICD-10-CM

## 2018-09-17 DIAGNOSIS — G47.10 HYPERSOMNIA: ICD-10-CM

## 2018-09-17 DIAGNOSIS — G47.419 PRIMARY NARCOLEPSY WITHOUT CATAPLEXY: ICD-10-CM

## 2018-09-17 DIAGNOSIS — Z13.0 SCREENING FOR DEFICIENCY ANEMIA: ICD-10-CM

## 2018-09-17 DIAGNOSIS — Z12.31 ENCOUNTER FOR SCREENING MAMMOGRAM FOR BREAST CANCER: ICD-10-CM

## 2018-09-17 DIAGNOSIS — Z13.1 DIABETES MELLITUS SCREENING: ICD-10-CM

## 2018-09-17 DIAGNOSIS — Z11.59 ENCOUNTER FOR HEPATITIS C SCREENING TEST FOR LOW RISK PATIENT: ICD-10-CM

## 2018-09-17 DIAGNOSIS — Z11.4 ENCOUNTER FOR SCREENING FOR HIV: ICD-10-CM

## 2018-09-17 PROCEDURE — 99396 PREV VISIT EST AGE 40-64: CPT | Performed by: FAMILY MEDICINE

## 2018-09-17 PROCEDURE — 90471 IMMUNIZATION ADMIN: CPT | Performed by: FAMILY MEDICINE

## 2018-09-17 PROCEDURE — 90472 IMMUNIZATION ADMIN EACH ADD: CPT | Performed by: FAMILY MEDICINE

## 2018-09-17 PROCEDURE — 90688 IIV4 VACCINE SPLT 0.5 ML IM: CPT | Performed by: FAMILY MEDICINE

## 2018-09-17 PROCEDURE — 90715 TDAP VACCINE 7 YRS/> IM: CPT | Performed by: FAMILY MEDICINE

## 2018-09-17 RX ORDER — TIZANIDINE 4 MG/1
TABLET ORAL
Qty: 90 TABLET | Refills: 1 | Status: CANCELLED | OUTPATIENT
Start: 2018-09-17

## 2018-09-17 RX ORDER — VENLAFAXINE HYDROCHLORIDE 150 MG/1
CAPSULE, EXTENDED RELEASE ORAL
Qty: 180 CAPSULE | Refills: 1 | Status: SHIPPED | OUTPATIENT
Start: 2018-09-17 | End: 2019-01-23 | Stop reason: SDUPTHER

## 2018-09-17 RX ORDER — ARMODAFINIL 250 MG/1
250 TABLET ORAL DAILY
Qty: 90 TABLET | Refills: 0 | Status: SHIPPED | OUTPATIENT
Start: 2018-09-17 | End: 2018-09-18 | Stop reason: SDUPTHER

## 2018-09-17 RX ORDER — OMEPRAZOLE 20 MG/1
CAPSULE, DELAYED RELEASE ORAL
Qty: 180 CAPSULE | Refills: 1 | Status: SHIPPED | OUTPATIENT
Start: 2018-09-17 | End: 2019-01-23 | Stop reason: SDUPTHER

## 2018-09-17 RX ORDER — ATENOLOL 50 MG/1
TABLET ORAL
Qty: 135 TABLET | Refills: 1 | Status: CANCELLED | OUTPATIENT
Start: 2018-09-17

## 2018-09-17 RX ORDER — METFORMIN HYDROCHLORIDE 500 MG/1
TABLET, EXTENDED RELEASE ORAL
Qty: 360 TABLET | Refills: 1 | Status: SHIPPED | OUTPATIENT
Start: 2018-09-17 | End: 2019-01-23 | Stop reason: SDUPTHER

## 2018-09-17 RX ORDER — FLUTICASONE PROPIONATE 50 MCG
1 SPRAY, SUSPENSION (ML) NASAL DAILY
Qty: 3 BOTTLE | Refills: 1 | Status: SHIPPED | OUTPATIENT
Start: 2018-09-17 | End: 2019-01-23 | Stop reason: SDUPTHER

## 2018-09-17 RX ORDER — TOPIRAMATE 25 MG/1
TABLET ORAL
Qty: 60 TABLET | Refills: 5 | Status: SHIPPED | OUTPATIENT
Start: 2018-09-17 | End: 2018-12-26 | Stop reason: SDUPTHER

## 2018-09-17 ASSESSMENT — ENCOUNTER SYMPTOMS
CONSTIPATION: 0
DIARRHEA: 1
FACIAL SWELLING: 0
EYE ITCHING: 1
PHOTOPHOBIA: 0
WHEEZING: 0
EYE PAIN: 0
COLOR CHANGE: 0
ABDOMINAL DISTENTION: 0
SINUS PRESSURE: 0
STRIDOR: 0
APNEA: 0
VOICE CHANGE: 0
CHOKING: 0
RHINORRHEA: 0
BLOOD IN STOOL: 0
NAUSEA: 1
SHORTNESS OF BREATH: 0
SINUS PAIN: 0
ANAL BLEEDING: 0
CHEST TIGHTNESS: 0
ABDOMINAL PAIN: 0
COUGH: 0
SORE THROAT: 0
TROUBLE SWALLOWING: 0
BACK PAIN: 0
VOMITING: 0
EYE REDNESS: 0
EYE DISCHARGE: 0
RECTAL PAIN: 0

## 2018-09-17 NOTE — PATIENT INSTRUCTIONS
You may receive a survey about your visit with us today. The feedback from our patients helps us identify what is working well and where the service to all patients can be enhanced. Thank you! Patient Education        Learning About Breast Cancer Screening  What is breast cancer screening? Breast cancer occurs when cells that are not normal grow in one or both of your breasts. Screening tests can help find breast cancer early. Cancer is easier to treat when it's found early. Having concerns about breast cancer is common. That's why it's important to talk with your doctor about when to start and how often to get screened for breast cancer. How is breast cancer screening done? Several screening tests can be used to check for breast cancer. · Mammograms check for signs of cancer using X-rays. They can show tumors that are too small for you or your doctor to feel. During a mammogram, a machine squeezes your breasts to make them flatter and easier to X-ray. At least two pictures are taken of each breast. One is taken from the top and one from the side. · 3-D mammograms are also called digital breast tomosynthesis. Your breast is positioned on a flat plate. A top plate is pressed against your breast to keep it in position. The X-ray arm then moves in an arc above the breast and takes many pictures. A computer uses these X-rays to create a three-dimensional image. · Clinical breast exams are a doctor's exam. Your doctor carefully feels your breasts and under your arms to check for lumps or other changes. After the screening, your doctor will tell you the results. You will also be told if you need any follow-up tests. When should you get screened? Talk with your doctor about when you should start being tested for breast cancer. How often you get tested and the kind of tests you get will depend on your age and your risk. The guidelines that follow are for women who have an average risk for breast cancer.  If you have a higher risk for breast cancer, such as having a family history of breast cancer in multiple relatives or at a young age, your doctor may recommend different screening for you. · Ages 21 to 44: Some experts recommend that women have a clinical breast exam every 3 years, starting at age 21. Ask your doctor how often you should have this test. If you have a high risk for breast cancer, talk with your doctor about when to start yearly mammograms and other screening tests. · Ages 36 and older: Talk with your doctor about how often you should have mammograms and clinical breast exams. What is your risk for breast cancer? If you don't already know your risk of breast cancer, you can ask your doctor about it. You can also look it up at www.cancer.gov/bcrisktool/. If your doctor says that you have a high or very high risk, ask about ways to reduce your risk. These could include getting extra screening, taking medicine, or having surgery. If you have a strong family history of breast cancer, ask your doctor about genetic testing. What steps can you take to stay healthy? Some things that increase your risk of breast cancer, such as your age and being female, cannot be controlled. But you can do some things to stay as healthy as you can. · Learn what your breasts normally look and feel like. If you notice any changes, tell your doctor. · Drink alcohol wisely. Your risk goes up the more you drink. For the best health, women should have no more than 1 drink a day or 7 drinks a week. · If you smoke, quit. When you quit smoking, you lower your chances of getting many types of cancer. You can also do your best to eat well, be active, and stay at a healthy weight. Eating healthy foods and being active every day, as well as staying at a healthy weight, may help prevent cancer. Where can you learn more? Go to https://jennifer.health-partners. org and sign in to your VidAngel account.  Enter A556 in the Search healthy weight. This will lower your risk for many problems, such as obesity, diabetes, heart disease, and high blood pressure. · Get at least 30 minutes of exercise on most days of the week. Walking is a good choice. You also may want to do other activities, such as running, swimming, cycling, or playing tennis or team sports. · Do not smoke. Smoking can make health problems worse. If you need help quitting, talk to your doctor about stop-smoking programs and medicines. These can increase your chances of quitting for good. · Protect your skin from too much sun. When you're outdoors from 10 a.m. to 4 p.m., stay in the shade or cover up with clothing and a hat with a wide brim. Wear sunglasses that block UV rays. Even when it's cloudy, put broad-spectrum sunscreen (SPF 30 or higher) on any exposed skin. · See a dentist one or two times a year for checkups and to have your teeth cleaned. · Wear a seat belt in the car. · Limit alcohol to 1 drink a day. Too much alcohol can cause health problems. Follow your doctor's advice about when to have certain tests. These tests can spot problems early. · Cholesterol. Your doctor will tell you how often to have this done based on your age, family history, or other things that can increase your risk for heart attack and stroke. · Blood pressure. Have your blood pressure checked during a routine doctor visit. Your doctor will tell you how often to check your blood pressure based on your age, your blood pressure results, and other factors. · Mammogram. Ask your doctor how often you should have a mammogram, which is an X-ray of your breasts. A mammogram can spot breast cancer before it can be felt and when it is easiest to treat. · Pap test and pelvic exam. Ask your doctor how often you should have a Pap test. You may not need to have a Pap test as often as you used to. · Vision. Have your eyes checked every year or two or as often as your doctor suggests.  Some experts under license by [facility] ([facility]). If you have questions about a medical condition or this instruction, always ask your healthcare professional. [address] any warranty or liability for your use of this information. Patient Education        Learning About Calcium  What is calcium? Calcium keeps your bones and muscles-including your heart-healthy and strong. Your body needs vitamin D to absorb calcium. People who don't get enough calcium and vitamin D throughout life have an increased chance of having thin and brittle bones (osteoporosis) in their later years. Thin and brittle bones break easily. They can lead to serious injuries. This is why it's important for you to get enough calcium and vitamin D as a child and as an adult. It helps keep your bones strong as you get older. And it protects you against possible breaks. Your body also uses vitamin D to help your muscles absorb calcium and work well. If your muscles don't get enough calcium, then they can cramp, hurt, or feel weak. You may have long-term (chronic) muscle aches and pains. How much calcium do you need? How much calcium you need each day changes as you age. The [employer] of Medicine recommends the following amounts of calcium each day. · Ages 1 to 3 years: 700 milligrams  · Ages 4 to 8 years: 1,000 milligrams  · Ages 5 to 25 years: 1,300 milligrams  · Ages 23 to 48 years: 1,000 milligrams  · Males 46 to 79 years: 1,000 milligrams  · Females 46 to 79 years: 1,200 milligrams  · Ages 70 and older: 1,200 milligrams  Women who are pregnant or breastfeeding need the same amount of calcium and vitamin D as other women their age. How can you get enough calcium? Calcium is in foods such as milk, cheese, and yogurt. Vegetables like broccoli, kale, and Chinese cabbage also have it. You can get calcium if you eat the soft edible bones in canned sardines and canned salmon.  Foods with added (fortified) calcium include some cereals, break. And do some stretching before you do tasks that involve bending or lifting. · Be smart with sports and other activities that take a lot of energy. Warm up before these activities. Afterwards, cool down and stretch your muscles. Try to avoid training too much. That can lead to problems like tennis elbow, which is a tendon injury. Also not using the right technique for an activity can cause problems. Baseball players and weight lifters may injure their shoulders if they use the wrong movements. Follow-up care is a key part of your treatment and safety. Be sure to make and go to all appointments, and call your doctor if you are having problems. It's also a good idea to know your test results and keep a list of the medicines you take. Where can you learn more? Go to https://Homeloc.Certify. org and sign in to your Everdream account. Enter F741 in the Flexenclosure box to learn more about \"Learning About Ergonomics. \"     If you do not have an account, please click on the \"Sign Up Now\" link. Current as of: November 29, 2017  Content Version: 11.7  © 1771-2949 [place] [employer]. Care instructions adapted under license by [facility] ([facility]). If you have questions about a medical condition or this instruction, always ask your healthcare professional. [address] any warranty or liability for your use of this information. Patient Education        Work-Life Balance: Care Instructions  Your Care Instructions    Do you ever feel like there is not enough time to do all of the things you have to do, and no time at all for the things you enjoy? If so, you are not alone. On average, people in the [place] have worked more and more hours each year since 1970. But in recent years, fewer people say they want to take on more at work, even if they would get promoted or get paid more money.  More and more workers say they want time to spend with their families and to do things that are important to them. Do you ever feel:  · That you always have more and more work to do at your job? · That too many people depend on you every day? · That you never have enough time for your family or friends? · That you never have time for hobbies or things you enjoy? · That each second of your day is scheduled? If you answered \"yes\" to any of these questions, take steps at work and at home to get your life into balance. Follow-up care is a key part of your treatment and safety. Be sure to make and go to all appointments, and call your doctor if you are having problems. How can you care for yourself at home? Manage your time  · Focus on the important things. Taking on too much can wear you out. Look at how you spend your time, and redirect your focus. Learn to say \"no\" and let go of things that do not matter. · Set one small goal at a time. Use a day planner. Break large projects into smaller ones. · Ask for help. Let your children, your spouse, your coworkers, and other people in your life help you get things done. · Leave your job at the office. If you give up free time to get more work done, you may pay for it with stress. If your job offers a flexible work schedule, use it to fit your own work style. For instance, come in earlier to have a longer lunch break, or make time for a yoga class or workout during your workday. · Unplug. Do not let technology (such as your cell phone or the Internet) erase the line between your time and your employer's time. Lower job stress  Job stress causes trouble at work and at home. At work, you may worry about things you have not had time to do at home. At home, you may worry about your job. This cycle upsets your work-life balance. Lowering your job stress can get your life back in balance. Job stress can be caused by:  · Pressure and deadlines. · Heavy workloads or long hours. · Not being allowed to make decisions.   · Health and safety hazards. · Feeling you may lose your job. · Unclear or changing job duties. · Too much responsibility. · Work that is very tiring or boring. Do any of these things bother you? Consider talking with your boss to change things. There are some things that you may not be able to control. But even a few small changes might help lower your stress. Take advantage of programs at work  Businesses make money and are better off in other ways if their employees are healthy and happy. For this reason, many companies have programs to help balance work life and home life. These programs may include:  · Flexible schedules and hours. · Time off for family reasons, education, or community service. · Being able to work from home. · Employee assistance programs to provide counseling. · Child-care programs. Check to see if your company has any of these or other programs that could help you. If not, consider talking to your boss about why work-life balance programs make good business sense. Even if your company does not start an official program, you may be able to get flexible hours, time off, or the ability to do some work from home. Know when to quit  If you are truly unhappy because of a stressful job, and if the suggestions here have not worked, it may be time to think about changing jobs or changing careers. But before you quit, take time to research your options. Where can you learn more? Go to https://DX Urgent Carenickeb.Dashbid. org and sign in to your [employer] account. Enter G996 in the Like.fm box to learn more about \"Work-Life Balance: Care Instructions. \"     If you do not have an account, please click on the \"Sign Up Now\" link. Current as of: October 10, 2017  Content Version: 11.7  © 1490-3104 [employer]. Care instructions adapted under license by [facility] ([facility]).  If you have questions about a medical condition or this instruction, always ask your healthcare professional. [employer] disclaims any warranty or liability for your use of this information.

## 2018-09-17 NOTE — TELEPHONE ENCOUNTER
Cost of meds. Needs New RX hard copy for Good RX to get it cheaper at [facility]. This is a [facility] patient, You have seen her before.  Devora Crawley out all week

## 2018-09-17 NOTE — PROGRESS NOTES
tremors, seizures, syncope, facial asymmetry, speech difficulty, weakness, light-headedness, numbness and headaches. Hematological: Negative for adenopathy. Bruises/bleeds easily. Psychiatric/Behavioral: Negative for agitation, behavioral problems, confusion, decreased concentration, dysphoric mood, hallucinations, self-injury, sleep disturbance and suicidal ideas. The patient is not nervous/anxious and is not hyperactive. Health Habits: With regard to her health habits, she eats 2 meals and 2 snacks per day. She does not exercise regularly. She does take over the counter vitamins. She never had a blood transfusion or tattoo. She wears seatbelts while riding a car. She does not text or talk on the phone while driving. She performs all of her ADL's without problem. She is independent, she cooks, drives, bathes, and gets dressed without assistance. She is not . She has 4 children. She does work. She works 30-40 hours a week. She is happy with her life. She rates her stress level a 6 in a scale 1-10. Health Maintenance   Topic Date Due    DTaP/Tdap/Td vaccine (1 - Tdap) 1975    Shingles Vaccine (1 of 2 - 2 Dose Series) 2006    Flu vaccine (1) 2018    Breast cancer screen  2018    A1C test (Diabetic or Prediabetic)  2019    Lipid screen  2023    Colon cancer screen colonoscopy  2026    Hepatitis C screen  Completed    HIV screen  Completed       She  reports that she has never smoked. She has never used smokeless tobacco. She reports that she drinks alcohol. She reports that she does not use drugs. She does perform regular breast self exams. PROVIDER NOTES     HPI:  Deb Wolfe is a 59y.o. year old female, who comes today for an annual wellness visit.       Past Medical History:   Diagnosis Date    Asthma     Dr. Doc Wynne Depression 2000      from [facility] neck    Fibromyalgia    Estella Ríos Hyperlipemia     check gallops  Abdomen - soft, nontender, nondistended, no masses or organomegaly  Neurological -  oriented, normal speech, no focal findings or movement disorder noted  Extremities - peripheral pulses normal, no pedal edema, no clubbing or cyanosis  Skin - normal coloration and turgor, no rashes, no suspicious skin lesions noted    Assessment:   Diagnosis Orders   1. Well adult exam     2. Encounter for screening mammogram for breast cancer  BONNIE DIGITAL SCREEN W CAD BILATERAL   3. Encounter for hepatitis C screening test for low risk patient  Hepatitis C Antibody   4. Encounter for screening for HIV  HIV Screen   5. Diabetes mellitus screening  Glucose, Fasting   6. Screening for deficiency anemia  CBC       Plan:    Return in about 4 months (around 1/17/2019). .    Counseling was provided today regarding the following topics:  Healthy eating habits and snacks, talking or texting while driving, vitamin/Mineral supplementation, vitamin D and calcium intake, regular exercise, and breast self exam. Written information has been provided.          Vero Osei MD

## 2018-09-18 DIAGNOSIS — G47.419 PRIMARY NARCOLEPSY WITHOUT CATAPLEXY: ICD-10-CM

## 2018-09-18 DIAGNOSIS — G47.10 HYPERSOMNIA: ICD-10-CM

## 2018-09-18 RX ORDER — ARMODAFINIL 250 MG/1
250 TABLET ORAL DAILY
Qty: 90 TABLET | Refills: 0 | Status: SHIPPED | OUTPATIENT
Start: 2018-09-18 | End: 2019-04-10 | Stop reason: SDUPTHER

## 2018-09-18 NOTE — TELEPHONE ENCOUNTER
Dr Donnelly Door can you resend this to [facility] please I called [facility] to cancel there

## 2018-09-22 DIAGNOSIS — E88.81 METABOLIC SYNDROME: ICD-10-CM

## 2018-09-22 DIAGNOSIS — K21.9 GASTROESOPHAGEAL REFLUX DISEASE WITHOUT ESOPHAGITIS: ICD-10-CM

## 2018-09-22 DIAGNOSIS — F33.42 RECURRENT MAJOR DEPRESSIVE DISORDER, IN FULL REMISSION (HCC): ICD-10-CM

## 2018-09-24 NOTE — TELEPHONE ENCOUNTER
Last visit- 9/17/2018  Next visit- 1/17/2019    Requested Prescriptions     Pending Prescriptions Disp Refills    omeprazole (PRILOSEC) 20 MG delayed release capsule [Pharmacy Med Name: OMEPRAZOLE 20MG CPDR] 180 capsule 1     Sig: TAKE 2 CAPSULES BY MOUTH DAILY.

## 2018-09-24 NOTE — TELEPHONE ENCOUNTER
Last visit- 9/17/2018  Next visit- 1/17/2019    Requested Prescriptions     Pending Prescriptions Disp Refills    metFORMIN (GLUCOPHAGE-XR) 500 MG extended release tablet [Pharmacy Med Name: METFORMIN HCL ER 500MG TB24] 360 tablet 1     Sig: TAKE 4 TABLETS BY MOUTH ONCE DAILY EVERY EVENING.  venlafaxine (EFFEXOR XR) 150 MG extended release capsule [Pharmacy Med Name: VENLAFAXINE HCL ER 150MG CP24] 180 capsule 1     Sig: TAKE 1 CAPSULE BY MOUTH TWICE DAILY.

## 2018-09-25 RX ORDER — VENLAFAXINE HYDROCHLORIDE 150 MG/1
CAPSULE, EXTENDED RELEASE ORAL
Qty: 180 CAPSULE | Refills: 1 | Status: SHIPPED | OUTPATIENT
Start: 2018-09-25 | End: 2019-01-23

## 2018-09-25 RX ORDER — OMEPRAZOLE 20 MG/1
CAPSULE, DELAYED RELEASE ORAL
Qty: 180 CAPSULE | Refills: 1 | Status: SHIPPED | OUTPATIENT
Start: 2018-09-25 | End: 2019-01-23

## 2018-09-25 RX ORDER — METFORMIN HYDROCHLORIDE 500 MG/1
TABLET, EXTENDED RELEASE ORAL
Qty: 360 TABLET | Refills: 1 | Status: SHIPPED | OUTPATIENT
Start: 2018-09-25 | End: 2019-01-23

## 2018-12-04 ENCOUNTER — HOSPITAL ENCOUNTER (OUTPATIENT)
Dept: WOMENS IMAGING | Age: 63
Discharge: HOME | End: 2018-12-04
Payer: COMMERCIAL

## 2018-12-04 DIAGNOSIS — Z12.31 ENCOUNTER FOR SCREENING MAMMOGRAM FOR BREAST CANCER: ICD-10-CM

## 2018-12-04 PROCEDURE — 77063 BREAST TOMOSYNTHESIS BI: CPT

## 2018-12-07 ENCOUNTER — HOSPITAL ENCOUNTER (OUTPATIENT)
Dept: WOMENS IMAGING | Age: 63
Discharge: HOME | End: 2018-12-07
Payer: COMMERCIAL

## 2018-12-07 DIAGNOSIS — N63.0 BREAST LUMP: ICD-10-CM

## 2018-12-07 PROCEDURE — 76642 ULTRASOUND BREAST LIMITED: CPT

## 2018-12-26 RX ORDER — TOPIRAMATE 25 MG/1
TABLET ORAL
Qty: 60 TABLET | Refills: 5 | Status: SHIPPED | OUTPATIENT
Start: 2018-12-26 | End: 2019-01-23

## 2019-01-23 ENCOUNTER — OFFICE VISIT (OUTPATIENT)
Dept: FAMILY MEDICINE CLINIC | Age: 64
End: 2019-01-23
Payer: COMMERCIAL

## 2019-01-23 VITALS
HEIGHT: 68 IN | RESPIRATION RATE: 14 BRPM | DIASTOLIC BLOOD PRESSURE: 75 MMHG | HEART RATE: 88 BPM | WEIGHT: 167 LBS | TEMPERATURE: 98.7 F | SYSTOLIC BLOOD PRESSURE: 130 MMHG | BODY MASS INDEX: 25.31 KG/M2

## 2019-01-23 DIAGNOSIS — E88.81 METABOLIC SYNDROME: ICD-10-CM

## 2019-01-23 DIAGNOSIS — I25.110 CORONARY ARTERY DISEASE INVOLVING NATIVE CORONARY ARTERY OF NATIVE HEART WITH UNSTABLE ANGINA PECTORIS (HCC): ICD-10-CM

## 2019-01-23 DIAGNOSIS — M79.7 FIBROMYALGIA: ICD-10-CM

## 2019-01-23 DIAGNOSIS — R51.9 PERSISTENT HEADACHES: Primary | ICD-10-CM

## 2019-01-23 DIAGNOSIS — I10 ESSENTIAL HYPERTENSION: ICD-10-CM

## 2019-01-23 DIAGNOSIS — G56.03 BILATERAL CARPAL TUNNEL SYNDROME: ICD-10-CM

## 2019-01-23 DIAGNOSIS — E78.00 HYPERCHOLESTEROLEMIA: ICD-10-CM

## 2019-01-23 DIAGNOSIS — M54.2 NECK PAIN: ICD-10-CM

## 2019-01-23 PROCEDURE — 99214 OFFICE O/P EST MOD 30 MIN: CPT | Performed by: FAMILY MEDICINE

## 2019-01-23 RX ORDER — TOPIRAMATE 25 MG/1
TABLET ORAL
Qty: 60 TABLET | Refills: 5 | Status: CANCELLED | OUTPATIENT
Start: 2019-01-23

## 2019-01-23 RX ORDER — VENLAFAXINE HYDROCHLORIDE 150 MG/1
CAPSULE, EXTENDED RELEASE ORAL
Qty: 180 CAPSULE | Refills: 1 | Status: SHIPPED | OUTPATIENT
Start: 2019-01-23 | End: 2019-07-19 | Stop reason: SDUPTHER

## 2019-01-23 RX ORDER — TIZANIDINE 4 MG/1
TABLET ORAL
Qty: 90 TABLET | Refills: 1 | Status: SHIPPED | OUTPATIENT
Start: 2019-01-23 | End: 2019-07-19 | Stop reason: SDUPTHER

## 2019-01-23 RX ORDER — TOPIRAMATE 50 MG/1
50 TABLET, FILM COATED ORAL 2 TIMES DAILY
Qty: 60 TABLET | Refills: 5 | Status: SHIPPED | OUTPATIENT
Start: 2019-01-23 | End: 2019-07-19 | Stop reason: SDUPTHER

## 2019-01-23 RX ORDER — ATENOLOL 50 MG/1
TABLET ORAL
Qty: 135 TABLET | Refills: 1 | Status: SHIPPED | OUTPATIENT
Start: 2019-01-23 | End: 2019-07-19 | Stop reason: SDUPTHER

## 2019-01-23 RX ORDER — FLUTICASONE PROPIONATE 50 MCG
1 SPRAY, SUSPENSION (ML) NASAL DAILY
Qty: 3 BOTTLE | Refills: 1 | Status: SHIPPED | OUTPATIENT
Start: 2019-01-23 | End: 2019-07-19 | Stop reason: SDUPTHER

## 2019-01-23 RX ORDER — METFORMIN HYDROCHLORIDE 500 MG/1
TABLET, EXTENDED RELEASE ORAL
Qty: 360 TABLET | Refills: 1 | Status: SHIPPED | OUTPATIENT
Start: 2019-01-23 | End: 2019-07-19 | Stop reason: SDUPTHER

## 2019-01-23 RX ORDER — OMEPRAZOLE 20 MG/1
CAPSULE, DELAYED RELEASE ORAL
Qty: 180 CAPSULE | Refills: 1 | Status: SHIPPED | OUTPATIENT
Start: 2019-01-23 | End: 2019-07-19 | Stop reason: SDUPTHER

## 2019-02-01 ENCOUNTER — TELEPHONE (OUTPATIENT)
Dept: FAMILY MEDICINE CLINIC | Age: 64
End: 2019-02-01

## 2019-02-26 ENCOUNTER — OFFICE VISIT (OUTPATIENT)
Dept: FAMILY MEDICINE CLINIC | Age: 64
End: 2019-02-26
Payer: COMMERCIAL

## 2019-02-26 VITALS
RESPIRATION RATE: 14 BRPM | OXYGEN SATURATION: 97 % | TEMPERATURE: 97.4 F | WEIGHT: 167.6 LBS | SYSTOLIC BLOOD PRESSURE: 126 MMHG | HEIGHT: 68 IN | BODY MASS INDEX: 25.4 KG/M2 | DIASTOLIC BLOOD PRESSURE: 80 MMHG | HEART RATE: 67 BPM

## 2019-02-26 DIAGNOSIS — J06.9 URI WITH COUGH AND CONGESTION: Primary | ICD-10-CM

## 2019-02-26 PROCEDURE — 99213 OFFICE O/P EST LOW 20 MIN: CPT | Performed by: NURSE PRACTITIONER

## 2019-02-26 RX ORDER — GUAIFENESIN AND CODEINE PHOSPHATE 100; 10 MG/5ML; MG/5ML
5 SOLUTION ORAL 3 TIMES DAILY PRN
Qty: 50 ML | Refills: 0 | Status: SHIPPED | OUTPATIENT
Start: 2019-02-26 | End: 2019-03-01

## 2019-02-26 RX ORDER — PREDNISONE 20 MG/1
20 TABLET ORAL 2 TIMES DAILY
Qty: 10 TABLET | Refills: 0 | Status: SHIPPED | OUTPATIENT
Start: 2019-02-26 | End: 2019-03-03

## 2019-02-26 RX ORDER — DOXYCYCLINE HYCLATE 100 MG
100 TABLET ORAL 2 TIMES DAILY
Qty: 20 TABLET | Refills: 0 | Status: SHIPPED | OUTPATIENT
Start: 2019-02-26 | End: 2019-03-08

## 2019-02-26 ASSESSMENT — ENCOUNTER SYMPTOMS
SHORTNESS OF BREATH: 0
SINUS PAIN: 1
COUGH: 1
SORE THROAT: 1
ABDOMINAL PAIN: 0
CONSTIPATION: 0
DIARRHEA: 1
RHINORRHEA: 1
NAUSEA: 0
WHEEZING: 0

## 2019-02-26 ASSESSMENT — PATIENT HEALTH QUESTIONNAIRE - PHQ9
SUM OF ALL RESPONSES TO PHQ QUESTIONS 1-9: 0
SUM OF ALL RESPONSES TO PHQ9 QUESTIONS 1 & 2: 0
SUM OF ALL RESPONSES TO PHQ QUESTIONS 1-9: 0
2. FEELING DOWN, DEPRESSED OR HOPELESS: 0
1. LITTLE INTEREST OR PLEASURE IN DOING THINGS: 0

## 2019-03-12 ENCOUNTER — NURSE ONLY (OUTPATIENT)
Dept: LAB | Age: 64
End: 2019-03-12

## 2019-03-12 ENCOUNTER — PROCEDURE VISIT (OUTPATIENT)
Dept: NEUROLOGY | Age: 64
End: 2019-03-12
Payer: COMMERCIAL

## 2019-03-12 DIAGNOSIS — I10 ESSENTIAL HYPERTENSION: ICD-10-CM

## 2019-03-12 DIAGNOSIS — M54.2 NECK PAIN: ICD-10-CM

## 2019-03-12 DIAGNOSIS — R20.0 BILATERAL HAND NUMBNESS: ICD-10-CM

## 2019-03-12 DIAGNOSIS — M54.12 CERVICAL RADICULOPATHY: Primary | ICD-10-CM

## 2019-03-12 LAB
ALBUMIN SERPL-MCNC: 4 G/DL (ref 3.5–5.1)
ALBUMIN SERPL-MCNC: 4.1 G/DL (ref 3.5–5.1)
ALP BLD-CCNC: 92 U/L (ref 38–126)
ALP BLD-CCNC: 93 U/L (ref 38–126)
ALT SERPL-CCNC: 8 U/L (ref 11–66)
ALT SERPL-CCNC: 9 U/L (ref 11–66)
ANION GAP SERPL CALCULATED.3IONS-SCNC: 10 MEQ/L (ref 8–16)
AST SERPL-CCNC: 12 U/L (ref 5–40)
AST SERPL-CCNC: 13 U/L (ref 5–40)
BASOPHILS # BLD: 1.2 %
BASOPHILS ABSOLUTE: 0.1 THOU/MM3 (ref 0–0.1)
BILIRUB SERPL-MCNC: 0.4 MG/DL (ref 0.3–1.2)
BILIRUB SERPL-MCNC: 0.4 MG/DL (ref 0.3–1.2)
BILIRUBIN DIRECT: < 0.2 MG/DL (ref 0–0.3)
BUN BLDV-MCNC: 11 MG/DL (ref 7–22)
CALCIUM SERPL-MCNC: 9.8 MG/DL (ref 8.5–10.5)
CHLORIDE BLD-SCNC: 105 MEQ/L (ref 98–111)
CHOLESTEROL, TOTAL: 156 MG/DL (ref 100–199)
CO2: 23 MEQ/L (ref 23–33)
CREAT SERPL-MCNC: 0.8 MG/DL (ref 0.4–1.2)
EOSINOPHIL # BLD: 2.5 %
EOSINOPHILS ABSOLUTE: 0.2 THOU/MM3 (ref 0–0.4)
ERYTHROCYTE [DISTWIDTH] IN BLOOD BY AUTOMATED COUNT: 16.8 % (ref 11.5–14.5)
ERYTHROCYTE [DISTWIDTH] IN BLOOD BY AUTOMATED COUNT: 48.2 FL (ref 35–45)
FERRITIN: 23 NG/ML (ref 10–291)
GFR SERPL CREATININE-BSD FRML MDRD: 73 ML/MIN/1.73M2
GLUCOSE BLD-MCNC: 103 MG/DL (ref 70–108)
HCT VFR BLD CALC: 37.5 % (ref 37–47)
HDLC SERPL-MCNC: 42 MG/DL
HEMOGLOBIN: 11.2 GM/DL (ref 12–16)
IMMATURE GRANS (ABS): 0.05 THOU/MM3 (ref 0–0.07)
IMMATURE GRANULOCYTES: 0.6 %
LDL CHOLESTEROL CALCULATED: 72 MG/DL
LYMPHOCYTES # BLD: 24.9 %
LYMPHOCYTES ABSOLUTE: 2.1 THOU/MM3 (ref 1–4.8)
MCH RBC QN AUTO: 24 PG (ref 26–33)
MCHC RBC AUTO-ENTMCNC: 29.9 GM/DL (ref 32.2–35.5)
MCV RBC AUTO: 80.3 FL (ref 81–99)
MONOCYTES # BLD: 6.7 %
MONOCYTES ABSOLUTE: 0.6 THOU/MM3 (ref 0.4–1.3)
NUCLEATED RED BLOOD CELLS: 0 /100 WBC
PLATELET # BLD: 437 THOU/MM3 (ref 130–400)
PMV BLD AUTO: 10.4 FL (ref 9.4–12.4)
POTASSIUM SERPL-SCNC: 4.9 MEQ/L (ref 3.5–5.2)
RBC # BLD: 4.67 MILL/MM3 (ref 4.2–5.4)
SEG NEUTROPHILS: 64.1 %
SEGMENTED NEUTROPHILS ABSOLUTE COUNT: 5.4 THOU/MM3 (ref 1.8–7.7)
SODIUM BLD-SCNC: 138 MEQ/L (ref 135–145)
TOTAL PROTEIN: 6.4 G/DL (ref 6.1–8)
TOTAL PROTEIN: 6.5 G/DL (ref 6.1–8)
TRIGL SERPL-MCNC: 209 MG/DL (ref 0–199)
VITAMIN D 25-HYDROXY: 20 NG/ML (ref 30–100)
WBC # BLD: 8.4 THOU/MM3 (ref 4.8–10.8)

## 2019-03-12 PROCEDURE — 95886 MUSC TEST DONE W/N TEST COMP: CPT | Performed by: PSYCHIATRY & NEUROLOGY

## 2019-03-12 PROCEDURE — 95911 NRV CNDJ TEST 9-10 STUDIES: CPT | Performed by: PSYCHIATRY & NEUROLOGY

## 2019-03-13 LAB
IGA: 103 MG/DL (ref 70–400)
IGG: 733 MG/DL (ref 700–1600)
IGM: 113 MG/DL (ref 40–230)

## 2019-03-15 LAB
ALPHA-1 ANTITRYPSIN: 128 MG/DL (ref 90–200)
COMPLEMENT TOTAL (CH50): 114 CAE UNITS (ref 60–144)
IGG SUBCLASSES: NORMAL

## 2019-03-16 LAB — MISC. #1 REFERENCE GROUP TEST: NORMAL

## 2019-03-18 ENCOUNTER — TELEPHONE (OUTPATIENT)
Dept: FAMILY MEDICINE CLINIC | Age: 64
End: 2019-03-18

## 2019-04-09 DIAGNOSIS — G47.10 HYPERSOMNIA: ICD-10-CM

## 2019-04-09 DIAGNOSIS — G47.419 PRIMARY NARCOLEPSY WITHOUT CATAPLEXY: ICD-10-CM

## 2019-04-09 NOTE — TELEPHONE ENCOUNTER
Redia Bison called requesting a refill on the following medications:  Requested Prescriptions     Pending Prescriptions Disp Refills    Armodafinil 250 MG TABS 90 tablet 0     Sig: Take 250 mg by mouth daily for 90 days.    req 90 day supply     Pharmacy verified:  [facility]      Date of last visit: 0912-18   Date of next visit (if applicable): 9/84/8675

## 2019-04-10 RX ORDER — ARMODAFINIL 250 MG/1
250 TABLET ORAL DAILY
Qty: 90 TABLET | Refills: 0 | Status: SHIPPED | OUTPATIENT
Start: 2019-04-10 | End: 2019-09-12 | Stop reason: SDUPTHER

## 2019-07-19 ENCOUNTER — OFFICE VISIT (OUTPATIENT)
Dept: FAMILY MEDICINE CLINIC | Age: 64
End: 2019-07-19
Payer: COMMERCIAL

## 2019-07-19 VITALS
SYSTOLIC BLOOD PRESSURE: 107 MMHG | TEMPERATURE: 98.3 F | HEIGHT: 68 IN | DIASTOLIC BLOOD PRESSURE: 69 MMHG | OXYGEN SATURATION: 98 % | HEART RATE: 72 BPM | WEIGHT: 165 LBS | BODY MASS INDEX: 25.01 KG/M2

## 2019-07-19 DIAGNOSIS — G43.009 MIGRAINE WITHOUT AURA AND WITHOUT STATUS MIGRAINOSUS, NOT INTRACTABLE: Primary | ICD-10-CM

## 2019-07-19 DIAGNOSIS — I10 ESSENTIAL HYPERTENSION: ICD-10-CM

## 2019-07-19 DIAGNOSIS — M79.7 FIBROMYALGIA: ICD-10-CM

## 2019-07-19 DIAGNOSIS — E88.81 METABOLIC SYNDROME: ICD-10-CM

## 2019-07-19 DIAGNOSIS — E78.00 HYPERCHOLESTEROLEMIA: ICD-10-CM

## 2019-07-19 DIAGNOSIS — F33.1 MODERATE RECURRENT MAJOR DEPRESSION (HCC): ICD-10-CM

## 2019-07-19 LAB — HBA1C MFR BLD: 5.9 %

## 2019-07-19 PROCEDURE — 99214 OFFICE O/P EST MOD 30 MIN: CPT | Performed by: FAMILY MEDICINE

## 2019-07-19 PROCEDURE — 83036 HEMOGLOBIN GLYCOSYLATED A1C: CPT | Performed by: FAMILY MEDICINE

## 2019-07-19 RX ORDER — FLUTICASONE PROPIONATE 50 MCG
1 SPRAY, SUSPENSION (ML) NASAL DAILY
Qty: 3 BOTTLE | Refills: 1 | Status: SHIPPED | OUTPATIENT
Start: 2019-07-19 | End: 2020-03-02 | Stop reason: SDUPTHER

## 2019-07-19 RX ORDER — TIZANIDINE 4 MG/1
TABLET ORAL
Qty: 90 TABLET | Refills: 1 | Status: ON HOLD | OUTPATIENT
Start: 2019-07-19 | End: 2019-12-19 | Stop reason: HOSPADM

## 2019-07-19 RX ORDER — METFORMIN HYDROCHLORIDE 500 MG/1
TABLET, EXTENDED RELEASE ORAL
Qty: 360 TABLET | Refills: 1 | Status: SHIPPED | OUTPATIENT
Start: 2019-07-19 | End: 2019-07-19 | Stop reason: SDUPTHER

## 2019-07-19 RX ORDER — ARIPIPRAZOLE 2 MG/1
2 TABLET ORAL DAILY
Qty: 30 TABLET | Refills: 3 | Status: SHIPPED | OUTPATIENT
Start: 2019-07-19 | End: 2019-10-18 | Stop reason: DRUGHIGH

## 2019-07-19 RX ORDER — VENLAFAXINE HYDROCHLORIDE 150 MG/1
CAPSULE, EXTENDED RELEASE ORAL
Qty: 180 CAPSULE | Refills: 1 | Status: SHIPPED | OUTPATIENT
Start: 2019-07-19 | End: 2020-03-02 | Stop reason: SDUPTHER

## 2019-07-19 RX ORDER — RIZATRIPTAN BENZOATE 10 MG/1
10 TABLET ORAL
Qty: 30 TABLET | Refills: 3 | Status: SHIPPED | OUTPATIENT
Start: 2019-07-19 | End: 2021-03-15 | Stop reason: SDUPTHER

## 2019-07-19 RX ORDER — METFORMIN HYDROCHLORIDE 500 MG/1
TABLET, EXTENDED RELEASE ORAL
Qty: 360 TABLET | Refills: 1 | Status: SHIPPED | OUTPATIENT
Start: 2019-07-19 | End: 2019-11-15

## 2019-07-19 RX ORDER — OMEPRAZOLE 20 MG/1
CAPSULE, DELAYED RELEASE ORAL
Qty: 180 CAPSULE | Refills: 1 | Status: SHIPPED | OUTPATIENT
Start: 2019-07-19 | End: 2020-03-02 | Stop reason: SDUPTHER

## 2019-07-19 RX ORDER — ATENOLOL 50 MG/1
TABLET ORAL
Qty: 135 TABLET | Refills: 1 | Status: SHIPPED | OUTPATIENT
Start: 2019-07-19 | End: 2020-03-02 | Stop reason: SDUPTHER

## 2019-07-19 RX ORDER — TOPIRAMATE 50 MG/1
50 TABLET, FILM COATED ORAL 2 TIMES DAILY
Qty: 180 TABLET | Refills: 1 | Status: SHIPPED | OUTPATIENT
Start: 2019-07-19 | End: 2019-10-18 | Stop reason: DRUGHIGH

## 2019-07-19 NOTE — PROGRESS NOTES
Tenormin 50 mg 1 1/2 tablet PO daily. She tries to follow low cholesterol diet. She had a stent placed in 3486.       Metabolic Syndrome:  She is taking Glucophage  mg 4 PO daily and denies any side effects from the medicine. She is trying to eat low cholesterol, low sodium diet. Last HbA1C done in 2018 was 6.1     FMG: She is taking Effexor  mg 2 PO daily, Zanaflex and Ultram for it off and on. She takes the Ultram for several years and it was prescribed for headaches at the beginning, but it helps when her FMG flares up also. She takes about 1-2 per week. Significant Past Medical History:    Past Medical History:   Diagnosis Date    Asthma     Dr. Essence Zhang Depression       from [facility] neck    Fibromyalgia    [facility] Hyperlipemia     check per Dr Fransisco Fischer Hyperlipidemia 2006    Insulin resistance     Narcolepsy 2000    per Dr. Marvetta Lefort via sleep study    Neck pain     cervical stenosis    PLMD (periodic limb movement disorder)     Prolonged emergence from general anesthesia     Tachycardia            Allergies:  is allergic to bactrim; iv dye [iodides]; tolectin [tolmetin sodium]; claforan [cefotaxime sodium in d5w]; and pcn [penicillins].     Medications:   Current Outpatient Medications   Medication Sig Dispense Refill    Armodafinil (NUVIGIL PO) Take by mouth      atenolol (TENORMIN) 50 MG tablet Take 1.5 tablets by mouth daily 135 tablet 1    fluticasone (FLONASE) 50 MCG/ACT nasal spray 1 spray by Nasal route daily 3 Bottle 1    tiZANidine (ZANAFLEX) 4 MG tablet Take 1 PO QHS as needed 90 tablet 1    venlafaxine (EFFEXOR XR) 150 MG extended release capsule TAKE ONE CAPSULE BY MOUTH TWICE A  capsule 1    topiramate (TOPAMAX) 50 MG tablet Take 1 tablet by mouth 2 times daily 180 tablet 1    omeprazole (PRILOSEC) 20 MG delayed release capsule Take 2 capsules by mouth daily 180 capsule 1    ARIPiprazole (ABILIFY)

## 2019-09-12 ENCOUNTER — OFFICE VISIT (OUTPATIENT)
Dept: PULMONOLOGY | Age: 64
End: 2019-09-12
Payer: COMMERCIAL

## 2019-09-12 VITALS
BODY MASS INDEX: 25.49 KG/M2 | DIASTOLIC BLOOD PRESSURE: 80 MMHG | OXYGEN SATURATION: 98 % | HEART RATE: 78 BPM | TEMPERATURE: 98 F | HEIGHT: 67 IN | WEIGHT: 162.4 LBS | SYSTOLIC BLOOD PRESSURE: 124 MMHG

## 2019-09-12 DIAGNOSIS — G47.419 PRIMARY NARCOLEPSY WITHOUT CATAPLEXY: Primary | ICD-10-CM

## 2019-09-12 DIAGNOSIS — G47.10 HYPERSOMNIA: ICD-10-CM

## 2019-09-12 PROCEDURE — 99213 OFFICE O/P EST LOW 20 MIN: CPT | Performed by: PHYSICIAN ASSISTANT

## 2019-09-12 RX ORDER — METHYLPHENIDATE HYDROCHLORIDE 10 MG/1
10 TABLET ORAL DAILY
Qty: 30 TABLET | Refills: 0 | Status: SHIPPED | OUTPATIENT
Start: 2019-09-12 | End: 2019-09-12 | Stop reason: SDUPTHER

## 2019-09-12 RX ORDER — ARMODAFINIL 250 MG/1
250 TABLET ORAL DAILY
Qty: 90 TABLET | Refills: 0 | Status: SHIPPED | OUTPATIENT
Start: 2019-09-12 | End: 2019-09-12 | Stop reason: SDUPTHER

## 2019-09-12 RX ORDER — METHYLPHENIDATE HYDROCHLORIDE 10 MG/1
10 TABLET ORAL DAILY
Qty: 30 TABLET | Refills: 0 | Status: SHIPPED | OUTPATIENT
Start: 2019-09-12 | End: 2019-10-12

## 2019-09-12 RX ORDER — ARMODAFINIL 250 MG/1
250 TABLET ORAL DAILY
Qty: 90 TABLET | Refills: 0 | Status: SHIPPED | OUTPATIENT
Start: 2019-09-12 | End: 2019-12-11

## 2019-10-18 ENCOUNTER — OFFICE VISIT (OUTPATIENT)
Dept: FAMILY MEDICINE CLINIC | Age: 64
End: 2019-10-18
Payer: COMMERCIAL

## 2019-10-18 VITALS
SYSTOLIC BLOOD PRESSURE: 128 MMHG | TEMPERATURE: 97.7 F | WEIGHT: 169.8 LBS | HEART RATE: 86 BPM | RESPIRATION RATE: 16 BRPM | HEIGHT: 67 IN | BODY MASS INDEX: 26.65 KG/M2 | DIASTOLIC BLOOD PRESSURE: 69 MMHG

## 2019-10-18 DIAGNOSIS — E55.9 VITAMIN D DEFICIENCY: ICD-10-CM

## 2019-10-18 DIAGNOSIS — F33.1 MODERATE RECURRENT MAJOR DEPRESSION (HCC): ICD-10-CM

## 2019-10-18 DIAGNOSIS — R51.9 DAILY HEADACHE: ICD-10-CM

## 2019-10-18 DIAGNOSIS — E88.81 METABOLIC SYNDROME: ICD-10-CM

## 2019-10-18 DIAGNOSIS — E78.00 HYPERCHOLESTEROLEMIA: ICD-10-CM

## 2019-10-18 DIAGNOSIS — I10 ESSENTIAL HYPERTENSION: ICD-10-CM

## 2019-10-18 DIAGNOSIS — G43.101 MIGRAINE WITH AURA AND WITH STATUS MIGRAINOSUS, NOT INTRACTABLE: Primary | ICD-10-CM

## 2019-10-18 LAB
ALBUMIN SERPL-MCNC: 4 G/DL (ref 3.5–5.1)
ALP BLD-CCNC: 84 U/L (ref 38–126)
ALT SERPL-CCNC: 14 U/L (ref 11–66)
ANION GAP SERPL CALCULATED.3IONS-SCNC: 14 MEQ/L (ref 8–16)
AST SERPL-CCNC: 17 U/L (ref 5–40)
BASOPHILS # BLD: 1.4 %
BASOPHILS ABSOLUTE: 0.1 THOU/MM3 (ref 0–0.1)
BILIRUB SERPL-MCNC: < 0.2 MG/DL (ref 0.3–1.2)
BUN BLDV-MCNC: 14 MG/DL (ref 7–22)
CALCIUM SERPL-MCNC: 9.4 MG/DL (ref 8.5–10.5)
CHLORIDE BLD-SCNC: 108 MEQ/L (ref 98–111)
CHOLESTEROL, TOTAL: 173 MG/DL (ref 100–199)
CO2: 21 MEQ/L (ref 23–33)
CREAT SERPL-MCNC: 0.9 MG/DL (ref 0.4–1.2)
EOSINOPHIL # BLD: 3.5 %
EOSINOPHILS ABSOLUTE: 0.3 THOU/MM3 (ref 0–0.4)
ERYTHROCYTE [DISTWIDTH] IN BLOOD BY AUTOMATED COUNT: 15.3 % (ref 11.5–14.5)
ERYTHROCYTE [DISTWIDTH] IN BLOOD BY AUTOMATED COUNT: 50.1 FL (ref 35–45)
GFR SERPL CREATININE-BSD FRML MDRD: 63 ML/MIN/1.73M2
GLUCOSE BLD-MCNC: 110 MG/DL (ref 70–108)
HCT VFR BLD CALC: 39.8 % (ref 37–47)
HDLC SERPL-MCNC: 47 MG/DL
HEMOGLOBIN: 12.1 GM/DL (ref 12–16)
IMMATURE GRANS (ABS): 0.08 THOU/MM3 (ref 0–0.07)
IMMATURE GRANULOCYTES: 0.9 %
LDL CHOLESTEROL CALCULATED: 71 MG/DL
LYMPHOCYTES # BLD: 26.7 %
LYMPHOCYTES ABSOLUTE: 2.4 THOU/MM3 (ref 1–4.8)
MCH RBC QN AUTO: 27.3 PG (ref 26–33)
MCHC RBC AUTO-ENTMCNC: 30.4 GM/DL (ref 32.2–35.5)
MCV RBC AUTO: 89.6 FL (ref 81–99)
MONOCYTES # BLD: 7 %
MONOCYTES ABSOLUTE: 0.6 THOU/MM3 (ref 0.4–1.3)
NUCLEATED RED BLOOD CELLS: 0 /100 WBC
PLATELET # BLD: 424 THOU/MM3 (ref 130–400)
PMV BLD AUTO: 10.2 FL (ref 9.4–12.4)
POTASSIUM SERPL-SCNC: 4.5 MEQ/L (ref 3.5–5.2)
RBC # BLD: 4.44 MILL/MM3 (ref 4.2–5.4)
SEG NEUTROPHILS: 60.5 %
SEGMENTED NEUTROPHILS ABSOLUTE COUNT: 5.4 THOU/MM3 (ref 1.8–7.7)
SODIUM BLD-SCNC: 143 MEQ/L (ref 135–145)
TOTAL PROTEIN: 6.4 G/DL (ref 6.1–8)
TRIGL SERPL-MCNC: 275 MG/DL (ref 0–199)
TSH SERPL DL<=0.05 MIU/L-ACNC: 1.12 UIU/ML (ref 0.4–4.2)
VITAMIN B-12: 473 PG/ML (ref 211–911)
WBC # BLD: 9 THOU/MM3 (ref 4.8–10.8)

## 2019-10-18 PROCEDURE — 99214 OFFICE O/P EST MOD 30 MIN: CPT | Performed by: FAMILY MEDICINE

## 2019-10-18 PROCEDURE — 90471 IMMUNIZATION ADMIN: CPT | Performed by: FAMILY MEDICINE

## 2019-10-18 PROCEDURE — 90688 IIV4 VACCINE SPLT 0.5 ML IM: CPT | Performed by: FAMILY MEDICINE

## 2019-10-18 RX ORDER — TOPIRAMATE 50 MG/1
TABLET, FILM COATED ORAL
Qty: 270 TABLET | Refills: 1 | Status: SHIPPED | OUTPATIENT
Start: 2019-10-18 | End: 2020-03-02 | Stop reason: SDUPTHER

## 2019-10-18 RX ORDER — ARIPIPRAZOLE 5 MG/1
5 TABLET ORAL DAILY
Qty: 30 TABLET | Refills: 3 | Status: SHIPPED | OUTPATIENT
Start: 2019-10-18 | End: 2020-03-02 | Stop reason: SDUPTHER

## 2019-10-24 ENCOUNTER — TELEPHONE (OUTPATIENT)
Dept: FAMILY MEDICINE CLINIC | Age: 64
End: 2019-10-24

## 2019-10-25 ENCOUNTER — TELEPHONE (OUTPATIENT)
Dept: FAMILY MEDICINE CLINIC | Age: 64
End: 2019-10-25

## 2019-11-05 ENCOUNTER — HOSPITAL ENCOUNTER (OUTPATIENT)
Dept: MRI IMAGING | Age: 64
Discharge: HOME | End: 2019-11-05
Payer: COMMERCIAL

## 2019-11-05 DIAGNOSIS — R51.9 DAILY HEADACHE: ICD-10-CM

## 2019-11-05 DIAGNOSIS — G43.101 MIGRAINE WITH AURA AND WITH STATUS MIGRAINOSUS, NOT INTRACTABLE: ICD-10-CM

## 2019-11-05 PROCEDURE — 6360000004 HC RX CONTRAST MEDICATION: Performed by: FAMILY MEDICINE

## 2019-11-05 PROCEDURE — A9579 GAD-BASE MR CONTRAST NOS,1ML: HCPCS | Performed by: FAMILY MEDICINE

## 2019-11-05 PROCEDURE — 70553 MRI BRAIN STEM W/O & W/DYE: CPT

## 2019-11-05 RX ADMIN — GADOTERIDOL 15 ML: 279.3 INJECTION, SOLUTION INTRAVENOUS at 14:54

## 2019-11-07 ENCOUNTER — TELEPHONE (OUTPATIENT)
Dept: FAMILY MEDICINE CLINIC | Age: 64
End: 2019-11-07

## 2019-11-15 DIAGNOSIS — E88.81 METABOLIC SYNDROME: ICD-10-CM

## 2019-11-15 RX ORDER — METFORMIN HYDROCHLORIDE 500 MG/1
TABLET, EXTENDED RELEASE ORAL
Qty: 360 TABLET | Refills: 1 | Status: SHIPPED | OUTPATIENT
Start: 2019-11-15 | End: 2020-03-02 | Stop reason: SDUPTHER

## 2019-11-19 ENCOUNTER — TELEPHONE (OUTPATIENT)
Dept: FAMILY MEDICINE CLINIC | Age: 64
End: 2019-11-19

## 2019-12-10 ENCOUNTER — TELEPHONE (OUTPATIENT)
Dept: FAMILY MEDICINE CLINIC | Age: 64
End: 2019-12-10

## 2019-12-11 ENCOUNTER — OFFICE VISIT (OUTPATIENT)
Dept: FAMILY MEDICINE CLINIC | Age: 64
End: 2019-12-11
Payer: COMMERCIAL

## 2019-12-11 VITALS
SYSTOLIC BLOOD PRESSURE: 130 MMHG | TEMPERATURE: 97.8 F | BODY MASS INDEX: 26.09 KG/M2 | RESPIRATION RATE: 14 BRPM | WEIGHT: 166.6 LBS | DIASTOLIC BLOOD PRESSURE: 81 MMHG | HEART RATE: 64 BPM

## 2019-12-11 DIAGNOSIS — E03.1: ICD-10-CM

## 2019-12-11 DIAGNOSIS — E78.00 HYPERCHOLESTEROLEMIA: ICD-10-CM

## 2019-12-11 DIAGNOSIS — M48.02 CERVICAL STENOSIS OF SPINE: ICD-10-CM

## 2019-12-11 DIAGNOSIS — M50.30 DEGENERATIVE DISC DISEASE, CERVICAL: ICD-10-CM

## 2019-12-11 DIAGNOSIS — M79.7 FIBROMYALGIA: ICD-10-CM

## 2019-12-11 DIAGNOSIS — I10 ESSENTIAL HYPERTENSION: ICD-10-CM

## 2019-12-11 DIAGNOSIS — E88.81 METABOLIC SYNDROME: ICD-10-CM

## 2019-12-11 DIAGNOSIS — Z01.818 PRE-OP EVALUATION: Primary | ICD-10-CM

## 2019-12-11 PROCEDURE — 99214 OFFICE O/P EST MOD 30 MIN: CPT | Performed by: FAMILY MEDICINE

## 2019-12-12 RX ORDER — ARMODAFINIL 150 MG/1
150 TABLET ORAL DAILY
COMMUNITY
End: 2020-03-17

## 2019-12-12 NOTE — PROGRESS NOTES
NPO after midnight  Bring insurance info and 's license  Wear comfortable clean clothing  Do not bring jewelry   Shower night before and morning of surgery with a liquid antibacterial soap  Bring medications in original bottles  Follow all instructions given by your physician   needed at discharge  Call [phone] for any questions

## 2019-12-17 ENCOUNTER — ANESTHESIA EVENT (OUTPATIENT)
Dept: OPERATING ROOM | Age: 64
DRG: 473 | End: 2019-12-17
Payer: COMMERCIAL

## 2019-12-18 ENCOUNTER — HOSPITAL ENCOUNTER (INPATIENT)
Age: 64
LOS: 1 days | Discharge: HOME | DRG: 473 | End: 2019-12-19
Attending: ORTHOPAEDIC SURGERY | Admitting: ORTHOPAEDIC SURGERY
Payer: COMMERCIAL

## 2019-12-18 ENCOUNTER — APPOINTMENT (OUTPATIENT)
Dept: ULTRASOUND IMAGING | Age: 64
DRG: 473 | End: 2019-12-18
Payer: COMMERCIAL

## 2019-12-18 ENCOUNTER — ANESTHESIA (OUTPATIENT)
Dept: OPERATING ROOM | Age: 64
DRG: 473 | End: 2019-12-18
Payer: COMMERCIAL

## 2019-12-18 ENCOUNTER — APPOINTMENT (OUTPATIENT)
Dept: GENERAL RADIOLOGY | Age: 64
DRG: 473 | End: 2019-12-18
Attending: ORTHOPAEDIC SURGERY
Payer: COMMERCIAL

## 2019-12-18 VITALS
TEMPERATURE: 97.2 F | SYSTOLIC BLOOD PRESSURE: 158 MMHG | DIASTOLIC BLOOD PRESSURE: 81 MMHG | RESPIRATION RATE: 14 BRPM | OXYGEN SATURATION: 100 %

## 2019-12-18 LAB
ABO: NORMAL
ANTIBODY SCREEN: NORMAL
RH FACTOR: NORMAL

## 2019-12-18 PROCEDURE — 0PP304Z REMOVAL OF INTERNAL FIXATION DEVICE FROM CERVICAL VERTEBRA, OPEN APPROACH: ICD-10-PCS | Performed by: ORTHOPAEDIC SURGERY

## 2019-12-18 PROCEDURE — 72020 X-RAY EXAM OF SPINE 1 VIEW: CPT

## 2019-12-18 PROCEDURE — 94760 N-INVAS EAR/PLS OXIMETRY 1: CPT

## 2019-12-18 PROCEDURE — 2709999900 HC NON-CHARGEABLE SUPPLY: Performed by: ORTHOPAEDIC SURGERY

## 2019-12-18 PROCEDURE — 2720000010 HC SURG SUPPLY STERILE: Performed by: ORTHOPAEDIC SURGERY

## 2019-12-18 PROCEDURE — 86900 BLOOD TYPING SEROLOGIC ABO: CPT

## 2019-12-18 PROCEDURE — 36415 COLL VENOUS BLD VENIPUNCTURE: CPT

## 2019-12-18 PROCEDURE — 94640 AIRWAY INHALATION TREATMENT: CPT

## 2019-12-18 PROCEDURE — 3700000001 HC ADD 15 MINUTES (ANESTHESIA): Performed by: ORTHOPAEDIC SURGERY

## 2019-12-18 PROCEDURE — 2500000003 HC RX 250 WO HCPCS: Performed by: PHYSICIAN ASSISTANT

## 2019-12-18 PROCEDURE — 2580000003 HC RX 258: Performed by: ORTHOPAEDIC SURGERY

## 2019-12-18 PROCEDURE — 3700000000 HC ANESTHESIA ATTENDED CARE: Performed by: ORTHOPAEDIC SURGERY

## 2019-12-18 PROCEDURE — 2580000003 HC RX 258: Performed by: PHYSICIAN ASSISTANT

## 2019-12-18 PROCEDURE — 3600000004 HC SURGERY LEVEL 4 BASE: Performed by: ORTHOPAEDIC SURGERY

## 2019-12-18 PROCEDURE — G0378 HOSPITAL OBSERVATION PER HR: HCPCS

## 2019-12-18 PROCEDURE — 86850 RBC ANTIBODY SCREEN: CPT

## 2019-12-18 PROCEDURE — 7100000000 HC PACU RECOVERY - FIRST 15 MIN: Performed by: ORTHOPAEDIC SURGERY

## 2019-12-18 PROCEDURE — 0RT30ZZ RESECTION OF CERVICAL VERTEBRAL DISC, OPEN APPROACH: ICD-10-PCS | Performed by: ORTHOPAEDIC SURGERY

## 2019-12-18 PROCEDURE — 2500000003 HC RX 250 WO HCPCS: Performed by: ORTHOPAEDIC SURGERY

## 2019-12-18 PROCEDURE — 2580000003 HC RX 258: Performed by: ANESTHESIOLOGY

## 2019-12-18 PROCEDURE — C1713 ANCHOR/SCREW BN/BN,TIS/BN: HCPCS | Performed by: ORTHOPAEDIC SURGERY

## 2019-12-18 PROCEDURE — 7100000001 HC PACU RECOVERY - ADDTL 15 MIN: Performed by: ORTHOPAEDIC SURGERY

## 2019-12-18 PROCEDURE — 0RG20A0 FUSION OF 2 OR MORE CERVICAL VERTEBRAL JOINTS WITH INTERBODY FUSION DEVICE, ANTERIOR APPROACH, ANTERIOR COLUMN, OPEN APPROACH: ICD-10-PCS | Performed by: ORTHOPAEDIC SURGERY

## 2019-12-18 PROCEDURE — 2780000010 HC IMPLANT OTHER: Performed by: ORTHOPAEDIC SURGERY

## 2019-12-18 PROCEDURE — 6370000000 HC RX 637 (ALT 250 FOR IP): Performed by: PHYSICIAN ASSISTANT

## 2019-12-18 PROCEDURE — L0120 CERV FLEX N/ADJ FOAM PRE OTS: HCPCS | Performed by: ORTHOPAEDIC SURGERY

## 2019-12-18 PROCEDURE — 86901 BLOOD TYPING SEROLOGIC RH(D): CPT

## 2019-12-18 PROCEDURE — 1200000000 HC SEMI PRIVATE

## 2019-12-18 PROCEDURE — 3600000014 HC SURGERY LEVEL 4 ADDTL 15MIN: Performed by: ORTHOPAEDIC SURGERY

## 2019-12-18 PROCEDURE — 2500000003 HC RX 250 WO HCPCS: Performed by: ANESTHESIOLOGY

## 2019-12-18 PROCEDURE — 6360000002 HC RX W HCPCS: Performed by: ANESTHESIOLOGY

## 2019-12-18 DEVICE — GRAFT BNE SUB W11XH7XL14MM CORT INTBDY FUS FRZ DRY BLK SPCR: Type: IMPLANTABLE DEVICE | Site: NECK | Status: FUNCTIONAL

## 2019-12-18 DEVICE — SCREW 3120514 4.0 X 14 SELF DRILL VAR
Type: IMPLANTABLE DEVICE | Site: NECK | Status: FUNCTIONAL
Brand: ATLANTIS® ANTERIOR CERVICAL PLATE SYSTEM

## 2019-12-18 DEVICE — DBM T41120 1CC GRAFTON GEL
Type: IMPLANTABLE DEVICE | Site: NECK | Status: FUNCTIONAL
Brand: GRAFTON®AND GRAFTON PLUS®DEMINERALIZED BONE MATRIX (DBM)

## 2019-12-18 RX ORDER — NEOSTIGMINE METHYLSULFATE 5 MG/5 ML
SYRINGE (ML) INTRAVENOUS PRN
Status: DISCONTINUED | OUTPATIENT
Start: 2019-12-18 | End: 2019-12-18 | Stop reason: SDUPTHER

## 2019-12-18 RX ORDER — ARMODAFINIL 150 MG/1
150 TABLET ORAL DAILY
Status: DISCONTINUED | OUTPATIENT
Start: 2019-12-18 | End: 2019-12-19 | Stop reason: HOSPADM

## 2019-12-18 RX ORDER — METFORMIN HYDROCHLORIDE 500 MG/1
2000 TABLET, EXTENDED RELEASE ORAL NIGHTLY
Status: DISCONTINUED | OUTPATIENT
Start: 2019-12-18 | End: 2019-12-19 | Stop reason: HOSPADM

## 2019-12-18 RX ORDER — CLINDAMYCIN PHOSPHATE 900 MG/50ML
900 INJECTION INTRAVENOUS EVERY 8 HOURS
Status: COMPLETED | OUTPATIENT
Start: 2019-12-18 | End: 2019-12-19

## 2019-12-18 RX ORDER — DEXAMETHASONE SODIUM PHOSPHATE 4 MG/ML
INJECTION, SOLUTION INTRA-ARTICULAR; INTRALESIONAL; INTRAMUSCULAR; INTRAVENOUS; SOFT TISSUE PRN
Status: DISCONTINUED | OUTPATIENT
Start: 2019-12-18 | End: 2019-12-18 | Stop reason: SDUPTHER

## 2019-12-18 RX ORDER — FENTANYL CITRATE 50 UG/ML
INJECTION, SOLUTION INTRAMUSCULAR; INTRAVENOUS PRN
Status: DISCONTINUED | OUTPATIENT
Start: 2019-12-18 | End: 2019-12-18 | Stop reason: SDUPTHER

## 2019-12-18 RX ORDER — LOSARTAN POTASSIUM 25 MG/1
25 TABLET ORAL NIGHTLY
Status: DISCONTINUED | OUTPATIENT
Start: 2019-12-18 | End: 2019-12-19 | Stop reason: HOSPADM

## 2019-12-18 RX ORDER — SODIUM CHLORIDE 0.9 % (FLUSH) 0.9 %
10 SYRINGE (ML) INJECTION EVERY 12 HOURS SCHEDULED
Status: DISCONTINUED | OUTPATIENT
Start: 2019-12-18 | End: 2019-12-18 | Stop reason: HOSPADM

## 2019-12-18 RX ORDER — CLINDAMYCIN PHOSPHATE 900 MG/50ML
900 INJECTION INTRAVENOUS
Status: COMPLETED | OUTPATIENT
Start: 2019-12-18 | End: 2019-12-18

## 2019-12-18 RX ORDER — ONDANSETRON 2 MG/ML
4 INJECTION INTRAMUSCULAR; INTRAVENOUS EVERY 6 HOURS PRN
Status: DISCONTINUED | OUTPATIENT
Start: 2019-12-18 | End: 2019-12-19 | Stop reason: HOSPADM

## 2019-12-18 RX ORDER — MEPERIDINE HYDROCHLORIDE 25 MG/ML
12.5 INJECTION INTRAMUSCULAR; INTRAVENOUS; SUBCUTANEOUS EVERY 5 MIN PRN
Status: DISCONTINUED | OUTPATIENT
Start: 2019-12-18 | End: 2019-12-18 | Stop reason: HOSPADM

## 2019-12-18 RX ORDER — ONDANSETRON 2 MG/ML
4 INJECTION INTRAMUSCULAR; INTRAVENOUS
Status: DISCONTINUED | OUTPATIENT
Start: 2019-12-18 | End: 2019-12-18 | Stop reason: HOSPADM

## 2019-12-18 RX ORDER — ACETAMINOPHEN 325 MG/1
650 TABLET ORAL EVERY 4 HOURS PRN
Status: DISCONTINUED | OUTPATIENT
Start: 2019-12-18 | End: 2019-12-19 | Stop reason: HOSPADM

## 2019-12-18 RX ORDER — ATORVASTATIN CALCIUM 80 MG/1
80 TABLET, FILM COATED ORAL DAILY
Status: DISCONTINUED | OUTPATIENT
Start: 2019-12-18 | End: 2019-12-19 | Stop reason: HOSPADM

## 2019-12-18 RX ORDER — NITROGLYCERIN 0.4 MG/1
0.4 TABLET SUBLINGUAL EVERY 5 MIN PRN
Status: DISCONTINUED | OUTPATIENT
Start: 2019-12-18 | End: 2019-12-19 | Stop reason: HOSPADM

## 2019-12-18 RX ORDER — SODIUM CHLORIDE 0.9 % (FLUSH) 0.9 %
10 SYRINGE (ML) INJECTION PRN
Status: DISCONTINUED | OUTPATIENT
Start: 2019-12-18 | End: 2019-12-19 | Stop reason: HOSPADM

## 2019-12-18 RX ORDER — VENLAFAXINE HYDROCHLORIDE 150 MG/1
150 CAPSULE, EXTENDED RELEASE ORAL 2 TIMES DAILY
Status: DISCONTINUED | OUTPATIENT
Start: 2019-12-18 | End: 2019-12-19 | Stop reason: HOSPADM

## 2019-12-18 RX ORDER — DOCUSATE SODIUM 100 MG/1
100 CAPSULE, LIQUID FILLED ORAL 2 TIMES DAILY
Status: DISCONTINUED | OUTPATIENT
Start: 2019-12-18 | End: 2019-12-19 | Stop reason: HOSPADM

## 2019-12-18 RX ORDER — FENTANYL CITRATE 50 UG/ML
50 INJECTION, SOLUTION INTRAMUSCULAR; INTRAVENOUS EVERY 5 MIN PRN
Status: DISCONTINUED | OUTPATIENT
Start: 2019-12-18 | End: 2019-12-18 | Stop reason: HOSPADM

## 2019-12-18 RX ORDER — GLYCOPYRROLATE 1 MG/5 ML
SYRINGE (ML) INTRAVENOUS PRN
Status: DISCONTINUED | OUTPATIENT
Start: 2019-12-18 | End: 2019-12-18 | Stop reason: SDUPTHER

## 2019-12-18 RX ORDER — SODIUM CHLORIDE 0.9 % (FLUSH) 0.9 %
10 SYRINGE (ML) INJECTION PRN
Status: DISCONTINUED | OUTPATIENT
Start: 2019-12-18 | End: 2019-12-18 | Stop reason: HOSPADM

## 2019-12-18 RX ORDER — POLYETHYLENE GLYCOL 3350 17 G/17G
17 POWDER, FOR SOLUTION ORAL DAILY PRN
Status: DISCONTINUED | OUTPATIENT
Start: 2019-12-18 | End: 2019-12-19 | Stop reason: HOSPADM

## 2019-12-18 RX ORDER — PROPOFOL 10 MG/ML
INJECTION, EMULSION INTRAVENOUS PRN
Status: DISCONTINUED | OUTPATIENT
Start: 2019-12-18 | End: 2019-12-18 | Stop reason: SDUPTHER

## 2019-12-18 RX ORDER — SODIUM CHLORIDE 0.9 % (FLUSH) 0.9 %
10 SYRINGE (ML) INJECTION EVERY 12 HOURS SCHEDULED
Status: DISCONTINUED | OUTPATIENT
Start: 2019-12-18 | End: 2019-12-19 | Stop reason: HOSPADM

## 2019-12-18 RX ORDER — PANTOPRAZOLE SODIUM 40 MG/1
40 TABLET, DELAYED RELEASE ORAL
Status: DISCONTINUED | OUTPATIENT
Start: 2019-12-19 | End: 2019-12-19 | Stop reason: HOSPADM

## 2019-12-18 RX ORDER — SODIUM CHLORIDE 9 MG/ML
INJECTION, SOLUTION INTRAVENOUS CONTINUOUS PRN
Status: DISCONTINUED | OUTPATIENT
Start: 2019-12-18 | End: 2019-12-18 | Stop reason: SDUPTHER

## 2019-12-18 RX ORDER — SODIUM CHLORIDE 9 MG/ML
INJECTION, SOLUTION INTRAVENOUS CONTINUOUS
Status: DISCONTINUED | OUTPATIENT
Start: 2019-12-18 | End: 2019-12-18

## 2019-12-18 RX ORDER — ATENOLOL 50 MG/1
50 TABLET ORAL NIGHTLY
Status: DISCONTINUED | OUTPATIENT
Start: 2019-12-18 | End: 2019-12-19 | Stop reason: HOSPADM

## 2019-12-18 RX ORDER — ROCURONIUM BROMIDE 10 MG/ML
INJECTION, SOLUTION INTRAVENOUS PRN
Status: DISCONTINUED | OUTPATIENT
Start: 2019-12-18 | End: 2019-12-18 | Stop reason: SDUPTHER

## 2019-12-18 RX ORDER — CYCLOBENZAPRINE HCL 10 MG
10 TABLET ORAL 3 TIMES DAILY PRN
Status: DISCONTINUED | OUTPATIENT
Start: 2019-12-18 | End: 2019-12-19 | Stop reason: HOSPADM

## 2019-12-18 RX ORDER — ARIPIPRAZOLE 5 MG/1
5 TABLET ORAL DAILY
Status: DISCONTINUED | OUTPATIENT
Start: 2019-12-18 | End: 2019-12-19 | Stop reason: HOSPADM

## 2019-12-18 RX ORDER — SUMATRIPTAN 50 MG/1
100 TABLET, FILM COATED ORAL
Status: DISPENSED | OUTPATIENT
Start: 2019-12-18 | End: 2019-12-18

## 2019-12-18 RX ORDER — SODIUM CHLORIDE 9 MG/ML
INJECTION, SOLUTION INTRAVENOUS CONTINUOUS
Status: DISCONTINUED | OUTPATIENT
Start: 2019-12-18 | End: 2019-12-19

## 2019-12-18 RX ORDER — EPHEDRINE SULFATE/0.9% NACL/PF 50 MG/5 ML
SYRINGE (ML) INTRAVENOUS PRN
Status: DISCONTINUED | OUTPATIENT
Start: 2019-12-18 | End: 2019-12-18 | Stop reason: SDUPTHER

## 2019-12-18 RX ORDER — MONTELUKAST SODIUM 10 MG/1
10 TABLET ORAL NIGHTLY
Status: DISCONTINUED | OUTPATIENT
Start: 2019-12-18 | End: 2019-12-19 | Stop reason: HOSPADM

## 2019-12-18 RX ORDER — OXYCODONE HYDROCHLORIDE AND ACETAMINOPHEN 5; 325 MG/1; MG/1
2 TABLET ORAL EVERY 4 HOURS PRN
Status: DISCONTINUED | OUTPATIENT
Start: 2019-12-18 | End: 2019-12-19 | Stop reason: HOSPADM

## 2019-12-18 RX ORDER — LIDOCAINE HYDROCHLORIDE AND EPINEPHRINE 10; 10 MG/ML; UG/ML
INJECTION, SOLUTION INFILTRATION; PERINEURAL PRN
Status: DISCONTINUED | OUTPATIENT
Start: 2019-12-18 | End: 2019-12-18 | Stop reason: ALTCHOICE

## 2019-12-18 RX ORDER — OXYCODONE HYDROCHLORIDE AND ACETAMINOPHEN 5; 325 MG/1; MG/1
1 TABLET ORAL EVERY 4 HOURS PRN
Status: DISCONTINUED | OUTPATIENT
Start: 2019-12-18 | End: 2019-12-19 | Stop reason: HOSPADM

## 2019-12-18 RX ORDER — LABETALOL 20 MG/4 ML (5 MG/ML) INTRAVENOUS SYRINGE
5 EVERY 10 MIN PRN
Status: DISCONTINUED | OUTPATIENT
Start: 2019-12-18 | End: 2019-12-18 | Stop reason: HOSPADM

## 2019-12-18 RX ORDER — TOPIRAMATE 25 MG/1
50 TABLET ORAL 2 TIMES DAILY
Status: DISCONTINUED | OUTPATIENT
Start: 2019-12-18 | End: 2019-12-19 | Stop reason: HOSPADM

## 2019-12-18 RX ADMIN — Medication 10 MG: at 08:54

## 2019-12-18 RX ADMIN — METFORMIN HYDROCHLORIDE 2000 MG: 500 TABLET, EXTENDED RELEASE ORAL at 22:12

## 2019-12-18 RX ADMIN — Medication 10 MG: at 09:08

## 2019-12-18 RX ADMIN — ARIPIPRAZOLE 5 MG: 5 TABLET ORAL at 22:13

## 2019-12-18 RX ADMIN — ROCURONIUM BROMIDE 15 MG: 10 INJECTION INTRAVENOUS at 09:51

## 2019-12-18 RX ADMIN — MONTELUKAST SODIUM 10 MG: 10 TABLET ORAL at 22:12

## 2019-12-18 RX ADMIN — ATENOLOL 50 MG: 50 TABLET ORAL at 22:13

## 2019-12-18 RX ADMIN — CLINDAMYCIN PHOSPHATE 900 MG: 900 INJECTION, SOLUTION INTRAVENOUS at 18:32

## 2019-12-18 RX ADMIN — ATORVASTATIN CALCIUM 80 MG: 80 TABLET, FILM COATED ORAL at 22:13

## 2019-12-18 RX ADMIN — PROPOFOL 150 MG: 10 INJECTION, EMULSION INTRAVENOUS at 08:49

## 2019-12-18 RX ADMIN — LOSARTAN POTASSIUM 25 MG: 25 TABLET, FILM COATED ORAL at 22:13

## 2019-12-18 RX ADMIN — FENTANYL CITRATE 25 MCG: 50 INJECTION INTRAMUSCULAR; INTRAVENOUS at 09:24

## 2019-12-18 RX ADMIN — SODIUM CHLORIDE: 9 INJECTION, SOLUTION INTRAVENOUS at 08:41

## 2019-12-18 RX ADMIN — Medication 2 PUFF: at 16:38

## 2019-12-18 RX ADMIN — OXYCODONE HYDROCHLORIDE AND ACETAMINOPHEN 1 TABLET: 5; 325 TABLET ORAL at 14:46

## 2019-12-18 RX ADMIN — VENLAFAXINE HYDROCHLORIDE 150 MG: 150 CAPSULE, EXTENDED RELEASE ORAL at 22:12

## 2019-12-18 RX ADMIN — Medication 10 MG: at 08:56

## 2019-12-18 RX ADMIN — TOPIRAMATE 50 MG: 25 TABLET, FILM COATED ORAL at 22:21

## 2019-12-18 RX ADMIN — CLINDAMYCIN PHOSPHATE 900 MG: 900 INJECTION, SOLUTION INTRAVENOUS at 08:56

## 2019-12-18 RX ADMIN — SODIUM CHLORIDE: 9 INJECTION, SOLUTION INTRAVENOUS at 07:39

## 2019-12-18 RX ADMIN — SODIUM CHLORIDE: 9 INJECTION, SOLUTION INTRAVENOUS at 10:31

## 2019-12-18 RX ADMIN — OXYCODONE HYDROCHLORIDE AND ACETAMINOPHEN 1 TABLET: 5; 325 TABLET ORAL at 22:19

## 2019-12-18 RX ADMIN — Medication 0.8 MG: at 10:40

## 2019-12-18 RX ADMIN — DOCUSATE SODIUM 100 MG: 100 CAPSULE, LIQUID FILLED ORAL at 22:19

## 2019-12-18 RX ADMIN — Medication 5 MG: at 10:40

## 2019-12-18 RX ADMIN — ROCURONIUM BROMIDE 40 MG: 10 INJECTION INTRAVENOUS at 08:49

## 2019-12-18 RX ADMIN — FENTANYL CITRATE 100 MCG: 50 INJECTION INTRAMUSCULAR; INTRAVENOUS at 08:45

## 2019-12-18 RX ADMIN — DEXAMETHASONE SODIUM PHOSPHATE 10 MG: 4 INJECTION, SOLUTION INTRAMUSCULAR; INTRAVENOUS at 08:58

## 2019-12-18 RX ADMIN — Medication 20 MG: at 09:11

## 2019-12-18 RX ADMIN — SODIUM CHLORIDE, PRESERVATIVE FREE 10 ML: 5 INJECTION INTRAVENOUS at 22:07

## 2019-12-18 RX ADMIN — FENTANYL CITRATE 125 MCG: 50 INJECTION INTRAMUSCULAR; INTRAVENOUS at 10:48

## 2019-12-18 ASSESSMENT — PULMONARY FUNCTION TESTS
PIF_VALUE: 19
PIF_VALUE: 18
PIF_VALUE: 19
PIF_VALUE: 17
PIF_VALUE: 19
PIF_VALUE: 19
PIF_VALUE: 31
PIF_VALUE: 18
PIF_VALUE: 18
PIF_VALUE: 13
PIF_VALUE: 11
PIF_VALUE: 17
PIF_VALUE: 18
PIF_VALUE: 19
PIF_VALUE: 15
PIF_VALUE: 18
PIF_VALUE: 18
PIF_VALUE: 19
PIF_VALUE: 18
PIF_VALUE: 18
PIF_VALUE: 6
PIF_VALUE: 19
PIF_VALUE: 15
PIF_VALUE: 13
PIF_VALUE: 18
PIF_VALUE: 19
PIF_VALUE: 20
PIF_VALUE: 13
PIF_VALUE: 15
PIF_VALUE: 18
PIF_VALUE: 19
PIF_VALUE: 19
PIF_VALUE: 0
PIF_VALUE: 18
PIF_VALUE: 18
PIF_VALUE: 15
PIF_VALUE: 1
PIF_VALUE: 18
PIF_VALUE: 16
PIF_VALUE: 18
PIF_VALUE: 13
PIF_VALUE: 18
PIF_VALUE: 14
PIF_VALUE: 18
PIF_VALUE: 19
PIF_VALUE: 18
PIF_VALUE: 4
PIF_VALUE: 19
PIF_VALUE: 18
PIF_VALUE: 18
PIF_VALUE: 19
PIF_VALUE: 19
PIF_VALUE: 17
PIF_VALUE: 18
PIF_VALUE: 2
PIF_VALUE: 18
PIF_VALUE: 19
PIF_VALUE: 17
PIF_VALUE: 18
PIF_VALUE: 18
PIF_VALUE: 19
PIF_VALUE: 18
PIF_VALUE: 18
PIF_VALUE: 17
PIF_VALUE: 18
PIF_VALUE: 15
PIF_VALUE: 18
PIF_VALUE: 1
PIF_VALUE: 18
PIF_VALUE: 1
PIF_VALUE: 1
PIF_VALUE: 18
PIF_VALUE: 14
PIF_VALUE: 18
PIF_VALUE: 19
PIF_VALUE: 18
PIF_VALUE: 36
PIF_VALUE: 18
PIF_VALUE: 2
PIF_VALUE: 18
PIF_VALUE: 27
PIF_VALUE: 18
PIF_VALUE: 16
PIF_VALUE: 18
PIF_VALUE: 18
PIF_VALUE: 14
PIF_VALUE: 18
PIF_VALUE: 21
PIF_VALUE: 18
PIF_VALUE: 18
PIF_VALUE: 19
PIF_VALUE: 18
PIF_VALUE: 2
PIF_VALUE: 26
PIF_VALUE: 18
PIF_VALUE: 13
PIF_VALUE: 16
PIF_VALUE: 19
PIF_VALUE: 18
PIF_VALUE: 19
PIF_VALUE: 21
PIF_VALUE: 18
PIF_VALUE: 18
PIF_VALUE: 17
PIF_VALUE: 18

## 2019-12-18 ASSESSMENT — PAIN SCALES - GENERAL
PAINLEVEL_OUTOF10: 3
PAINLEVEL_OUTOF10: 0
PAINLEVEL_OUTOF10: 4
PAINLEVEL_OUTOF10: 0
PAINLEVEL_OUTOF10: 2
PAINLEVEL_OUTOF10: 3
PAINLEVEL_OUTOF10: 0
PAINLEVEL_OUTOF10: 0
PAINLEVEL_OUTOF10: 3

## 2019-12-18 ASSESSMENT — PAIN DESCRIPTION - ONSET: ONSET: ON-GOING

## 2019-12-18 ASSESSMENT — PAIN DESCRIPTION - ORIENTATION: ORIENTATION: ANTERIOR

## 2019-12-18 ASSESSMENT — PAIN DESCRIPTION - PAIN TYPE: TYPE: SURGICAL PAIN

## 2019-12-18 ASSESSMENT — PAIN DESCRIPTION - FREQUENCY: FREQUENCY: CONTINUOUS

## 2019-12-18 ASSESSMENT — PAIN DESCRIPTION - PROGRESSION: CLINICAL_PROGRESSION: GRADUALLY IMPROVING

## 2019-12-18 ASSESSMENT — PAIN DESCRIPTION - LOCATION: LOCATION: NECK

## 2019-12-18 ASSESSMENT — PAIN - FUNCTIONAL ASSESSMENT: PAIN_FUNCTIONAL_ASSESSMENT: PREVENTS OR INTERFERES SOME ACTIVE ACTIVITIES AND ADLS

## 2019-12-18 ASSESSMENT — PAIN DESCRIPTION - DESCRIPTORS: DESCRIPTORS: ACHING

## 2019-12-18 NOTE — H&P
[address] [place] [address]                       PREOPERATIVE HISTORY AND PHYSICAL    PATIENT NAME: Jalen Littlejohn              :        1955  MED REC NO:   [id number]                           ROOM:  ACCOUNT NO:   [de-identified]                           ADMIT DATE: 2019  PROVIDER:     Mohinder Houser PA-C    She is a patient of Dr. Katie Alcantara who will be undergoing surgery  tomorrow at 2019 at [facility], which will include  a removal of anterior plate from C4 to C6, ACDF of C3-C4 and C6-C7 with  allograft bone and plating. CHIEF COMPLAINT:  Cervical pain and bilateral upper extremity numbness. HISTORY OF PRESENT ILLNESS:  The patient is a 60-year-old female who  presented to the office today with complaints of worsening neck and arm  pain with associated numbness. These symptoms have been worsening now  since 2019. She has been worsening with symptoms of consistent  headache, right-sided shoulder pain, and upper extremity pins and  needles sensation that have been failing to improve. She is also having  increased shakiness and tremor with her hands and loss of dexterity and  fine motor control. She has noticed a progressive loss of strength with  her  and frequently drops objects. Her ability to use her hands for  fine tasks has also been significantly limited. She has failed to  improve despite conservative treatments including the use of  over-the-counter medications and activity modification and is now  electing to proceed further with operative management. She had  previously undergone an ACDF of C4 to C6 under Dr. Osvaldo mondragon in  . She has been medically cleared by her primary care provider, Dr. Ria Aguirre and her cardiologist, Dr. Samir Sheridan, for this operation. DRUG ALLERGIES:  Are to CLAFORAN, PENICILLIN, and DYE.     PAST MEDICAL HISTORY:  Includes history of also weakened at 4/5.  5/5 strength with bilateral extension  of the elbow, wrist extension and finger extension is maintained. Negative Bay's sign bilateral.  +2 pulses in the radial artery. IMAGING:  MRI imaging studies of the cervical spine shows previous  anterior cervical diskectomy and bony fusion at C4-C5 and C5-C6. There  is loss of lumbar cervical lordosis with anterolisthesis at C3-C4 and  C4-C5 and pseudodisk of listhesis with stenosis to the left exiting the  thecal sac at the C3-C4 level. There is also mild right and  moderate/severe left foraminal narrowing secondary to uncinate  hypertrophy and facet hypertrophy. At C6-C7, there is anterolisthesis  of C6 on C7 creating mild right and moderate left foraminal narrowing  secondary to uncinate hypertrophy and severe facet arthropathy with  abutment of the left exiting C7 nerve root. ASSESSMENT:  1. Cervical pain. 2.  Cervical radiculopathy. 3.  Cervical spinal stenosis. PLAN:  The plan moving forward with the patient is a removal of hardware  from C4 to C6 with ACDF of C3-C4 and C6-C7 with use of allograft bone  and plating. CONCLUSION:  This is a patient who had previously undergone an ACDF of  C4 to C6 and now, she has adjacent level stenosis at levels C3-C4 and  C6-C7 above and below her previous fusion. She has failed to improve  despite conservative treatments and is electing to proceed with further  operative management. She has been medically cleared by her family  provider and cardiologist to undergo this operation. We have received  informed consent from the patient. We have gone over the risks and  benefits of surgery which include postoperative pain, nerve root injury,  difficulty swallowing, dysphagia, voice change, wound infection, and  spinal fluid leak. We have answered the patient's questions and  concerns to her satisfaction. Absolutely, no guarantees have been made  for the results of the surgery.     [facility]

## 2019-12-18 NOTE — H&P
I have reviewed the history and physical and examined the patient. I find no relevant changes. I have reviewed with the patient and/or family members, during the preoperative office visit the risks, benefits, and alternatives to the procedure.     Delbert Gowers

## 2019-12-19 VITALS
SYSTOLIC BLOOD PRESSURE: 115 MMHG | BODY MASS INDEX: 25.58 KG/M2 | HEART RATE: 86 BPM | TEMPERATURE: 98.2 F | HEIGHT: 68 IN | OXYGEN SATURATION: 95 % | DIASTOLIC BLOOD PRESSURE: 77 MMHG | RESPIRATION RATE: 18 BRPM | WEIGHT: 168.8 LBS

## 2019-12-19 LAB
GLUCOSE BLD-MCNC: 95 MG/DL (ref 70–108)
GLUCOSE BLD-MCNC: 96 MG/DL (ref 70–108)

## 2019-12-19 PROCEDURE — G0378 HOSPITAL OBSERVATION PER HR: HCPCS

## 2019-12-19 PROCEDURE — 82948 REAGENT STRIP/BLOOD GLUCOSE: CPT

## 2019-12-19 PROCEDURE — 97161 PT EVAL LOW COMPLEX 20 MIN: CPT

## 2019-12-19 PROCEDURE — 94640 AIRWAY INHALATION TREATMENT: CPT

## 2019-12-19 PROCEDURE — 94760 N-INVAS EAR/PLS OXIMETRY 1: CPT

## 2019-12-19 PROCEDURE — 6370000000 HC RX 637 (ALT 250 FOR IP): Performed by: PHYSICIAN ASSISTANT

## 2019-12-19 PROCEDURE — 96365 THER/PROPH/DIAG IV INF INIT: CPT

## 2019-12-19 PROCEDURE — 2580000003 HC RX 258: Performed by: PHYSICIAN ASSISTANT

## 2019-12-19 PROCEDURE — 2500000003 HC RX 250 WO HCPCS: Performed by: PHYSICIAN ASSISTANT

## 2019-12-19 RX ORDER — CYCLOBENZAPRINE HCL 10 MG
10 TABLET ORAL 3 TIMES DAILY PRN
Qty: 50 TABLET | Refills: 0 | Status: SHIPPED | OUTPATIENT
Start: 2019-12-19 | End: 2019-12-29

## 2019-12-19 RX ORDER — OXYCODONE HYDROCHLORIDE AND ACETAMINOPHEN 5; 325 MG/1; MG/1
1 TABLET ORAL EVERY 4 HOURS PRN
Qty: 42 TABLET | Refills: 0 | Status: SHIPPED | OUTPATIENT
Start: 2019-12-19 | End: 2019-12-26

## 2019-12-19 RX ADMIN — SODIUM CHLORIDE, PRESERVATIVE FREE 10 ML: 5 INJECTION INTRAVENOUS at 08:17

## 2019-12-19 RX ADMIN — CLINDAMYCIN PHOSPHATE 900 MG: 900 INJECTION, SOLUTION INTRAVENOUS at 00:30

## 2019-12-19 RX ADMIN — OXYCODONE HYDROCHLORIDE AND ACETAMINOPHEN 1 TABLET: 5; 325 TABLET ORAL at 12:31

## 2019-12-19 RX ADMIN — VENLAFAXINE HYDROCHLORIDE 150 MG: 150 CAPSULE, EXTENDED RELEASE ORAL at 08:15

## 2019-12-19 RX ADMIN — PANTOPRAZOLE SODIUM 40 MG: 40 TABLET, DELAYED RELEASE ORAL at 06:58

## 2019-12-19 RX ADMIN — OXYCODONE HYDROCHLORIDE AND ACETAMINOPHEN 1 TABLET: 5; 325 TABLET ORAL at 08:30

## 2019-12-19 RX ADMIN — ARIPIPRAZOLE 5 MG: 5 TABLET ORAL at 08:14

## 2019-12-19 RX ADMIN — ARMODAFINIL 150 MG: 150 TABLET ORAL at 08:16

## 2019-12-19 RX ADMIN — Medication 2 PUFF: at 08:25

## 2019-12-19 RX ADMIN — CYCLOBENZAPRINE 10 MG: 10 TABLET, FILM COATED ORAL at 14:18

## 2019-12-19 RX ADMIN — DOCUSATE SODIUM 100 MG: 100 CAPSULE, LIQUID FILLED ORAL at 08:18

## 2019-12-19 RX ADMIN — OXYCODONE HYDROCHLORIDE AND ACETAMINOPHEN 2 TABLET: 5; 325 TABLET ORAL at 16:55

## 2019-12-19 RX ADMIN — TOPIRAMATE 50 MG: 25 TABLET, FILM COATED ORAL at 08:14

## 2019-12-19 RX ADMIN — OXYCODONE HYDROCHLORIDE AND ACETAMINOPHEN 1 TABLET: 5; 325 TABLET ORAL at 04:31

## 2019-12-19 RX ADMIN — CYCLOBENZAPRINE 10 MG: 10 TABLET, FILM COATED ORAL at 04:31

## 2019-12-19 ASSESSMENT — PAIN SCALES - GENERAL
PAINLEVEL_OUTOF10: 4
PAINLEVEL_OUTOF10: 5
PAINLEVEL_OUTOF10: 5
PAINLEVEL_OUTOF10: 7
PAINLEVEL_OUTOF10: 4

## 2019-12-19 NOTE — DISCHARGE INSTR - COC
mo and older Fluzone, Flulaval, Fluarix and 3 yrs and older Afluria) 09/17/2018, 10/18/2019    Pneumococcal Conjugate 13-valent (Emily Adam) 06/25/2015    Tdap (Boostrix, Adacel) 09/17/2018    Zoster Recombinant (Shingrix) 09/17/2018, 10/18/2019       Active Problems:  Patient Active Problem List   Diagnosis Code    Insulin resistance E88.81    Hyperlipidemia E78.5    Sinus tachycardia R00.0    Cervical radiculopathy M54.12    H/O class III angina pectoris Z86.79    Abnormal nuclear stress test R94.39    Narcolepsy G47.419    PLMD (periodic limb movement disorder) G47.61    Hypersomnia G47.10       Isolation/Infection:   Isolation          No Isolation        Patient Infection Status     None to display          Nurse Assessment:  Last Vital Signs: /77   Pulse 86   Temp 98.2 °F (36.8 °C) (Oral)   Resp 18   Ht 5' 8\" (1.727 m)   Wt 168 lb 12.8 oz (76.6 kg)   SpO2 95%   BMI 25.67 kg/m²     Last documented pain score (0-10 scale): Pain Level: 4  Last Weight:   Wt Readings from Last 1 Encounters:   12/18/19 168 lb 12.8 oz (76.6 kg)     Mental Status:  {IP PT MENTAL STATUS:20030}    IV Access:  { QIAN IV ACCESS:496798662}    Nursing Mobility/ADLs:  Walking   {[facility] DME UDGK:043917212}  Transfer  {[facility] DME KXCL:550465796}  Bathing  {[facility] DME JUUJ:056293682}  Dressing  {[facility] DME FJYB:702605828}  Toileting  {[facility] DME DUCR:106701605}  Feeding  {[facility] DME ERBV:784900211}  Med Admin  {[facility] DME AIXQ:920144142}  Med Delivery   { QIAN MED Delivery:795526257}    Wound Care Documentation and Therapy:        Elimination:  Continence:   · Bowel: {YES / AQ:04559}  · Bladder: {YES / UF:20793}  Urinary Catheter: {Urinary Catheter:441823266}   Colostomy/Ileostomy/Ileal Conduit: {YES / PF:71305}       Date of Last BM: ***    Intake/Output Summary (Last 24 hours) at 12/19/2019 1358  Last data filed at 12/19/2019 0424  Gross per 24 hour   Intake 2343.84 ml   Output 50 ml   Net 2293.84 ml     I/O last 3 completed shifts:   In: 3806.4 [P.O.:900;  I.V.:2906.4]  Out: 110 [Drains:60; Blood:50]    Safety Concerns:     [facility] Safety Concerns:438960385}    Impairments/Disabilities:      [facility] Impairments/Disabilities:622038259}    Nutrition Therapy:  Current Nutrition Therapy:   [facility] Diet List:787247171}    Routes of Feeding: {[facility]P DME Other Feedings:204260919}  Liquids: {Slp liquid thickness:94745}  Daily Fluid Restriction: {CHP DME Yes amt example:452154597}  Last Modified Barium Swallow with Video (Video Swallowing Test): {Done Not Done HRSJ:187984972}    Treatments at the Time of Hospital Discharge:   Respiratory Treatments: ***  Oxygen Therapy:  {Therapy; copd oxygen:11071}  Ventilator:    {MH CC Vent FIJW:270586790}    Rehab Therapies: {THERAPEUTIC INTERVENTION:8944071243}  Weight Bearing Status/Restrictions: [address] [place] [address] Weight Bearin}  Other Medical Equipment (for information only, NOT a DME order):  {EQUIPMENT:496132920}  Other Treatments: ***    Patient's personal belongings (please select all that are sent with patient):  {[facility] DME Belongings:501271305}    RN SIGNATURE:  {Esignature:770960562}    CASE MANAGEMENT/SOCIAL WORK SECTION    Inpatient Status Date: ***    Readmission Risk Assessment Score:  Readmission Risk              Risk of Unplanned Readmission:        9           Discharging to Facility/ Agency   · Name:   · Address:  · Phone:  · Fax:    Dialysis Facility (if applicable)   · Name:  · Address:  · Dialysis Schedule:  · Phone:  · Fax:    / signature: {Esignature:343849052}    PHYSICIAN SECTION    Prognosis: {Prognosis:1045598342}    Condition at Discharge: [facility] Patient Condition:332311789}    Rehab Potential (if transferring to Rehab): {Prognosis:0990526987}    Recommended Labs or Other Treatments After Discharge: ***    Physician Certification: I certify the above information and transfer of Amos Schmidt  is necessary for the continuing treatment of the diagnosis listed and that

## 2019-12-19 NOTE — OP NOTE
[address] [place] [address]                                OPERATIVE REPORT    PATIENT NAME: Iris Chiang              :        1955  MED REC NO:   [id number]                           ROOM:       0004  ACCOUNT NO:   [de-identified]                           ADMIT DATE: 2019  PROVIDER:     DIAMOND San Plan OF PROCEDURE:  2019    PREOPERATIVE DIAGNOSES:  1. Cervical spondylosis and stenosis with upper extremity radiculopathy      over levels of C3-C4 and C6-C7.  2.  Status post previous ACDF level C4, C5, C6 from previous date of      surgery of year . POSTOPERATIVE DIAGNOSES:  1. Cervical spondylosis and stenosis with upper extremity radiculopathy      over levels of C3-C4 and C6-C7.  2.  Status post previous ACDF level C4, C5, C6 from previous date of      surgery of 2012. OPERATIONS PERFORMED:  1. Anterior cervical plate removal, level of C4, C5, C6 including      plating of four bone screws. 2.  Assessment of fusion of levels of C4-C5 and C5-C6 with finding of      solid union. 3.  Anterior cervical diskectomies of levels of C3-C4 and C6-C7 with      complete uncus decompression and clearance of canal across each of these      two levels. 4.  Anterior cervical interbody fusion of levels of C3-C4 and C6-C7.  5.  Use of structural allograft bone graft Medtronic Cornerstone for      each of these two levels of interbody fusion. 6.  Use of allograft bone [place] DBM gel for the central fill of bone      grafting at levels of C3-C4 and levels of C6-C7. 7.  Plating of cervical spine levels of C3-C4 and C6-C7. These are two      separate independent plates with a plate at level of Q2-P9 measuring 21      mm in length with four bone screws used and at level of C6-C7 measuring      21 mm in length with four bone screws used.     SURGEON:  Naila Gooden MD    ASSISTANT:  Susie Fuentes previous levels  of operation. We first approached this by removing the locking device and the six bone  screws from the plate at levels of C4, C5, and C6. Once the plating was  removed as well as the bone screws, we could then assess the fusion of  levels of C4-C5 and C5-C6 and assess these to be very well fused. These  were ultimately very well fused and we would then begin by placing  Frederick pin distraction and blade retraction at level of C3-C4 and bring  the microscope in having placed Frederick pin distraction at these levels  as well as the Frederick retractor. Under microscopic visualization, a  complete anterior cervical diskectomy would be complete by removal of  disk material, endplate cartilage, hypertrophic osteophytes as well  including the posterior aspect of the canal very thoroughly with  thinning of the uncus with use of a marivel ultimately then removing the  PLL and remaining uncus and bone spur formation with disk herniation at  level C3-C4 bilateral.  The uncus on the left hand side of C3-C4 was  abundantly enlarged and this was recognized on the preoperative MRI  study. This will be removed so that the ball-tip probe could pass well  through each of the opening foramina left and right sides of C3-C4. This level was well decorticated as well and  ultimately, we would place a graft of a 6 x 14 x 11 mm in size at this  level and fill this with use of a DBM gel and then relax the distraction  pins and then plate levels on U0-K7 with use of a 21 mm Medtronic  Atlantis Elite plate using four bone screws, two per level C3 and C4 and  then engage each locking mechanism. We then moved down level of C6-C7  and would similarly maintain soft tissue protection and disk space  distraction placing Frederick pins at levels of C6 and C7 and set the  Frederick retractor.   We used small Peralta retractors here as well and  this would allow us to expose the disk space, bring the microscope in,  and perform a very similar technique with a complete removal of disk  material working anterior to posterior at the disk space level to clear  the canal very thoroughly. Endplates were also well decorticated as  well, working anterior to posterior clearing the canal very thoroughly  and all posterior osteophytes and disk herniation were taken as well  posteriorly. The PLL was removed as well and all disk herniation  materials were removed very thoroughly so that the ball-tip probe could  pass well through each of these two opening foramina post decompression. With the canal being thoroughly cleared, we then would place the  grafting along the well-decorticated bony edges which was a 7 x 14 x 11  mm graft at level of C6-C7. We relaxed the distraction pins, removed  these as well, and would plate with a 21 mm plate over levels of C6-C7,  attached this to the bone with a total of four screws each measuring 14  mm in length. Each locking mechanism would be engaged as well across  the plate at levels of G1-R5. We then irrigated thoroughly, suctioned  dry, backed out the retractors, placed a drain, and closed in layers  with the drain tubing sewn in as well. We ensured that the esophagus  was free and finished with layer closure and performed a Monocryl stitch   buried suture. We would also apply sterile dressings and Aspen collar and then review  x-rays that were taken in the room that would show interbody grafting  and plate placement individually at levels of C3 and C4 and C6-C7 with  levels of C4-C5 and C6-C7 being well fused from previous surgery. With  a collar applied, we then awoke the patient, took her back to Recovery  post extubation, and would take her back to recovery. My first  assistant was Taran Delgado PA-C, as well throughout the operation as  well. Taran Delgado, KAMI, assisted throughout the procedure with positioning,  draping, retraction, wound closure, dressing, and splint application.         Tariq Ley

## 2019-12-19 NOTE — DISCHARGE INSTR - DIET

## 2019-12-19 NOTE — PROGRESS NOTES
[facility]  INPATIENT PHYSICAL THERAPY  EVALUATION  [facility] ORTHOPEDICS 7K - 7K-04/004-A    Time In: 7909  Time Out: 0900  Timed Code Treatment Minutes: 0 Minutes  Minutes: 10          Date: 2019  Patient Name: Clarisa Perry,  Gender:  female        MRN: [id number]  : 1955  (62 y.o.)      Referring Practitioner: MAURI Marion PA-C  Diagnosis: cervical radiculopathy  Additional Pertinent Hx: s/p REMOVAL OF PLATE E6-3, ACDF Y3-5, C6-7 WITH PLATING/MEDTRONIC on  by Dr. Sandeep Victoria     Restrictions/Precautions:  Restrictions/Precautions: General Precautions, Surgical Protocols  Required Braces or Orthoses  Cervical: miami J  Position Activity Restriction  Spinal Precautions: No Lifting    Subjective:  Chart Reviewed: Yes  Patient assessed for rehabilitation services?: Yes  Subjective: RN approved eval. pt in bed on arrival, agrees to therapy. voices no concerns with returning home    General:  Overall Orientation Status: Within Normal Limits  Follows Commands: Within Functional Limits    Vision: Impaired  Vision Exceptions: Wears glasses at all times    Hearing: Within functional limits         Pain:  Yes.   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

## 2019-12-19 NOTE — PLAN OF CARE
Problem: Pain:  Goal: Pain level will decrease  Description  Pain level will decrease  Outcome: Ongoing  Note:   Pt report pain at 3/10 on scale. Pt states oral prn pain medication, rest and repositioning are  helping to achieve pain goal of a 2/10 on scale. Problem: Neurological  Goal: Maximum potential motor/sensory/cognitive function  Outcome: Ongoing  Note:   Pt alert and oriented x 4. Hand  and pedal push and pull strong bilaterally. Denies n/t     Problem: Cardiovascular  Goal: No DVT, peripheral vascular complications  Outcome: Ongoing  Note:   Denies chest pain and calf tenderness. No other s/s of DVTs noted. Moderate palpable pulses. Problem: Respiratory  Goal: No pulmonary complications  Outcome: Ongoing  Note:   No pulmonary complications. Lung sounds clear throughout. Problem:   Goal: Adequate urinary output  Outcome: Ongoing  Note:   Pt voiding adequate amounts of yellow, clear urine. Problem: Nutrition  Goal: Optimal nutrition therapy  Outcome: Ongoing  Note:   Pt on general diet. No n/v noted     Problem: Skin Integrity/Risk  Goal: No skin breakdown during hospitalization  Outcome: Ongoing  Note:   No signs of new skin breakdown with each assessment. Skin remains warm, dry, intact. Patient understands the importance of frequent repositioning in order to prevent any skin breakdown. Neck dressing clean,dry and intact     Problem: Infection:  Goal: Will remain free from infection  Description  Will remain free from infection  Outcome: Ongoing  Note:   No fevers, tachycardia, hypotension noted. Pt denies any complaints       Problem: Safety:  Goal: Free from accidental physical injury  Description  Free from accidental physical injury  Outcome: Ongoing  Note:   Pt  free from accidental physical injury. Safe environment provided. Hourly rounding in place.       Problem: Discharge Planning:  Goal: Patients continuum of care needs are met  Description  Patients continuum of care

## 2019-12-19 NOTE — PROGRESS NOTES
Discharge AVS reviewed with patient, discussed drain and dressing care, and referral to [facility] services for drain care, she demonstrates ability to remove collar and reapply. Dressing on neck has a small shadow of drainage showing through. They will call her tomorrow am to set up a time to meet with her. She verbalizes understanding of all discharge follow ups and will make her appt with Hari Sosa. She voices understanding of the date of her follow up with Dr Emily Rodriguez. Meds to bed delivered her Percocet and Flexeril to her room prior to discharge. Patient verablizes that she is ready to go home today, she is happy, and she has no complaints or concerns at discharge. Discharged via wheelchair by transport team to discharge lobby to be taken home by her daughter.

## 2019-12-20 ENCOUNTER — CARE COORDINATION (OUTPATIENT)
Dept: CASE MANAGEMENT | Age: 64
End: 2019-12-20

## 2019-12-21 ENCOUNTER — CARE COORDINATION (OUTPATIENT)
Dept: CASE MANAGEMENT | Age: 64
End: 2019-12-21

## 2019-12-23 ENCOUNTER — CARE COORDINATION (OUTPATIENT)
Dept: CASE MANAGEMENT | Age: 64
End: 2019-12-23

## 2019-12-23 ENCOUNTER — CARE COORDINATION (OUTPATIENT)
Dept: OTHER | Facility: CLINIC | Age: 64
End: 2019-12-23

## 2019-12-26 NOTE — DISCHARGE SUMMARY
Orthopedic Spine Discharge Summary      Patient Identification:   Dania Rebolledo   : 1955  MRN: [id number]   Account: [de-identified]      Patient's PCP: Flavia Heller MD    Admit Date: 2019     Discharge Date: 19    Admitting Physician: Jca Evans MD     Discharge Provider: Weldon Castleman, PA-C , Dr. Isiah Mead    Discharge Diagnoses:  1. Cervical spondylosis and stenosis with upper extremity radiculopathy      over levels of C3-C4 and C6-C7.  2.  Status post previous ACDF level C4, C5, C6 from previous date of      surgery of year . The patient was seen and examined on day of discharge and this discharge summary is in conjunction with any daily progress note from day of discharge. Operation Performed:  1. Anterior cervical plate removal, level of C4, C5, C6 including      plating of four bone screws. 2.  Assessment of fusion of levels of C4-C5 and C5-C6 with finding of      solid union. 3.  Anterior cervical diskectomies of levels of C3-C4 and C6-C7 with      complete uncus decompression and clearance of canal across each of these      two levels. 4.  Anterior cervical interbody fusion of levels of C3-C4 and C6-C7.  5.  Use of structural allograft bone graft Medtronic Cornerstone for      each of these two levels of interbody fusion. 6.  Use of allograft bone [place] DBM gel for the central fill of bone      grafting at levels of C3-C4 and levels of C6-C7. 7.  Plating of cervical spine levels of C3-C4 and C6-C7. These are two      separate independent plates with a plate at level of N8-M0 measuring 21      mm in length with four bone screws used and at level of C6-C7 measuring      21 mm in length with four bone screws used. Hospital Course: The patient is a 62 y.o. female, who presented to our office having symptoms of severe arm and neck discomfort with numbness and tingling.  Due to failed outpatient conservative therapies, the patient elected to undergo

## 2019-12-27 NOTE — PROGRESS NOTES
CLINICAL PHARMACY NOTE: MEDS TO [address] Select Patient?: Yes  Total # of Prescriptions Filled: 2   The following medications were delivered to the patient:  Cyclobenzaprine 10mg  Oxycodone-Acetaminophen 5/325mg  Total # of Interventions Completed: 2  Time Spent (min): 30    Additional Documentation:

## 2020-01-08 ENCOUNTER — CARE COORDINATION (OUTPATIENT)
Dept: OTHER | Facility: CLINIC | Age: 65
End: 2020-01-08

## 2020-01-16 ENCOUNTER — CARE COORDINATION (OUTPATIENT)
Dept: OTHER | Facility: CLINIC | Age: 65
End: 2020-01-16

## 2020-01-16 NOTE — CARE COORDINATION
[place] [address] Transitions Follow Up Call    2020    Patient: Rylee Gentile  Patient : 1955   MRN: [id number]  Reason for Admission: Cervical radiculopathy   Discharge Date: 19 RARS: Readmission Risk Score: 9         Spoke with: Amie Souza, patient. ACM spoke with patient for Final CT Call. Amie Souza states she is doing well post-op, she is an RN. She states her wound is clean, dry, intact and healing without complication. She denies s/s or concerns of infections. Amie Souza notes having headaches post procedure, she has a history of migraines, takes Topamax and has Maxalt which she has not taken since surgery. No HA at this current time. No specific area of onset identified. Pain differs per episode Patient discussed with Neurosurgery PA at f/u visit. Has not yet updated them on the increasing pain and frequency. States she is assisting her mother at this time out of town and plans to contact PCP and Neurology for follow up upon returning home. Denied ACM assistance with appointment scheduling. ACM discussed the following RED FLAGS and encouraged patient to contact 911 for life threatening emergencies and PCP office  for non life-threatening symptoms. ACM also encouraged outreach to Nurse Access Line for assessment and intervention guidance as needed. Reminded patient of alternatives to ED such as urgent care, walk in clinics and e-visits as available (email sent) with locations near her. Call 911 anytime you think you may need emergency care. For example, call if:    · You have signs of a stroke. These may include:  ? Sudden numbness, paralysis, or weakness in your face, arm, or leg, especially on only one side of your body. ? Sudden vision changes. ? Sudden trouble speaking. ? Sudden confusion or trouble understanding simple statements. ? Sudden problems with walking or balance.   ? A sudden, severe headache that is different from past headaches.    Call your doctor now or seek immediate medical care if:    · You have new or worse nausea and vomiting.     · You have a new or higher fever.     · Your headache gets much worse.    Watch closely for changes in your health, and be sure to contact your doctor if:    · You are not getting better after 2 days (48 hours). Patient declines additional CM needs at this time, ACM will sign off. Care Transitions Subsequent and Final Call    Schedule Follow Up Appointment with PCP:  Declined  Subsequent and Final Calls  Do you have any ongoing symptoms?:  Yes  Onset of Patient-reported symptoms:  Other  Patient-reported symptoms:  Other  Interventions for patient-reported symptoms:  Notified PCP/Physician  Have your medications changed?:  No  Do you have any questions related to your medications?:  No  Do you currently have any active services?:  No  Do you have any needs or concerns that I can assist you with?:  Yes  Patient-reported Needs or Concerns:  ACM provided list of walk in clinics, urgent cares, Nurse access line, and evisit informaion to patient should she need care while visiting her mother. Identified Barriers:  Stress, Time Constraints  Care Transitions Interventions     Other Services:  Completed (Comment: MMO coverage review & Utilization Education )                           Other Interventions:             Follow Up  Future Appointments   Date Time Provider Ryland Castro   9/14/2020 11:45 AM Lissa Bello, [address] [facility] [place], [place]

## 2020-03-02 ENCOUNTER — OFFICE VISIT (OUTPATIENT)
Dept: FAMILY MEDICINE CLINIC | Age: 65
End: 2020-03-02
Payer: COMMERCIAL

## 2020-03-02 VITALS
SYSTOLIC BLOOD PRESSURE: 97 MMHG | TEMPERATURE: 98.2 F | DIASTOLIC BLOOD PRESSURE: 55 MMHG | RESPIRATION RATE: 12 BRPM | BODY MASS INDEX: 26.49 KG/M2 | WEIGHT: 174.8 LBS | HEART RATE: 75 BPM | HEIGHT: 68 IN

## 2020-03-02 PROCEDURE — 99214 OFFICE O/P EST MOD 30 MIN: CPT | Performed by: FAMILY MEDICINE

## 2020-03-02 RX ORDER — ARIPIPRAZOLE 5 MG/1
5 TABLET ORAL DAILY
Qty: 90 TABLET | Refills: 1 | Status: SHIPPED | OUTPATIENT
Start: 2020-03-02 | End: 2020-08-31

## 2020-03-02 RX ORDER — METFORMIN HYDROCHLORIDE 500 MG/1
TABLET, EXTENDED RELEASE ORAL
Qty: 360 TABLET | Refills: 1 | Status: SHIPPED | OUTPATIENT
Start: 2020-03-02 | End: 2020-08-31

## 2020-03-02 RX ORDER — TOPIRAMATE 50 MG/1
TABLET, FILM COATED ORAL
Qty: 270 TABLET | Refills: 1 | Status: SHIPPED | OUTPATIENT
Start: 2020-03-02 | End: 2020-05-04

## 2020-03-02 RX ORDER — OMEPRAZOLE 20 MG/1
CAPSULE, DELAYED RELEASE ORAL
Qty: 180 CAPSULE | Refills: 1 | Status: SHIPPED | OUTPATIENT
Start: 2020-03-02 | End: 2020-08-31

## 2020-03-02 RX ORDER — ATENOLOL 50 MG/1
TABLET ORAL
Qty: 135 TABLET | Refills: 1 | Status: SHIPPED | OUTPATIENT
Start: 2020-03-02 | End: 2020-08-31

## 2020-03-02 RX ORDER — LOSARTAN POTASSIUM 25 MG/1
25 TABLET ORAL DAILY
Qty: 30 TABLET | Refills: 3 | Status: CANCELLED | OUTPATIENT
Start: 2020-03-02

## 2020-03-02 RX ORDER — FLUTICASONE PROPIONATE 50 MCG
1 SPRAY, SUSPENSION (ML) NASAL DAILY
Qty: 3 BOTTLE | Refills: 1 | Status: SHIPPED | OUTPATIENT
Start: 2020-03-02 | End: 2021-03-15 | Stop reason: SDUPTHER

## 2020-03-02 RX ORDER — ATORVASTATIN CALCIUM 80 MG/1
80 TABLET, FILM COATED ORAL DAILY
Qty: 30 TABLET | Refills: 3 | Status: CANCELLED | OUTPATIENT
Start: 2020-03-02

## 2020-03-02 RX ORDER — VENLAFAXINE HYDROCHLORIDE 150 MG/1
CAPSULE, EXTENDED RELEASE ORAL
Qty: 180 CAPSULE | Refills: 1 | Status: SHIPPED | OUTPATIENT
Start: 2020-03-02 | End: 2020-11-02

## 2020-03-02 ASSESSMENT — PATIENT HEALTH QUESTIONNAIRE - PHQ9
1. LITTLE INTEREST OR PLEASURE IN DOING THINGS: 0
SUM OF ALL RESPONSES TO PHQ QUESTIONS 1-9: 0
SUM OF ALL RESPONSES TO PHQ QUESTIONS 1-9: 0
SUM OF ALL RESPONSES TO PHQ9 QUESTIONS 1 & 2: 0
2. FEELING DOWN, DEPRESSED OR HOPELESS: 0

## 2020-03-02 NOTE — PROGRESS NOTES
[address]  [address] [place], [address]  Ph:   [phone]  Fax: [phone]    Provider:  Dr. Camden Marquis    Patient:  Renee Jung  YOB: 1955      Visit Date:  3/2/2020     Reason For Visit:   Chief Complaint   Patient presents with    Follow-up    Medication Refill    Hyperlipidemia    Hypertension        Parker Montanez is a 62 y.o. female who comes today to the office for follow up of hypoplastic thyroid, depression, hypertension, hypercholesterolemia and fibromyalgia. Since I saw her last time she had her cervical fusion. She is recuperating very well    Depression: Last visit I increased Abilify from 2 mg to 4 mg and to 5 mg every day she is also taking Effexor  mg 1 tablet PO BID. She denies any side effects from it. She is feeling less depressed  and less stressed. Hyperlipidemia:   She is taking Lipitor 80 mg 1 tablet PO daily. She has low HDL and elevated LDL. She denies side efects from the medicines. She follows a low cholesterol diet. Last labs done in April 2018 showed Total cholesterol 177, LDL 86, Triglycerides 225 and HDL 46. There is not a family history of hyperlipidemia. There is a family history of early ischemic heart disease. She  is adherent to low cholesterol diet. She does not exercise regularly, but she is very active.        HTN/CAD:  She is taking Cozaar 25 mg 1 tablet PO daily, Tenormin 50 mg 1 1/2 tablet PO daily. She tries to follow low cholesterol diet. She had a stent placed in February 12, 2015. She has dana stable and asymptomatic.     Metabolic Syndrome:  She is taking Glucophage  mg 4 PO daily and denies any side effects from the medicine. She is trying to eat low cholesterol, low sodium diet. Last HbA1C done July 19, 2019 was 5.9%. FMG: She is taking Effexor  mg 2 PO daily, Zanaflex and Ultram for it off and on.   She takes the Ultram for several years and it was prescribed for headaches at the beginning, but it helps when her FMG flares up also. She takes about 1-2 per week. Vitamin D deficiency:  Last level done in March was 20. She is taking calcium with vitamin D replacement. Significant Past Medical History:    Past Medical History:   Diagnosis Date    Asthma     Dr. Alaina Jackman Depression       from [facility] neck    Fibromyalgia    Megan Sabillon Hyperlipemia     check per Dr Abdirizak Hector Insulin resistance     Narcolepsy     per Dr. Josefa Cordoba via sleep study    Neck pain     cervical stenosis    PLMD (periodic limb movement disorder)     Prolonged emergence from general anesthesia     Tachycardia            Allergies:  is allergic to bactrim; iv dye [iodides]; tolectin [tolmetin sodium]; claforan [cefotaxime sodium in d5w]; and pcn [penicillins]. Medications:   Current Outpatient Medications   Medication Sig Dispense Refill    metFORMIN (GLUCOPHAGE-XR) 500 MG extended release tablet TAKE 4 TABLETS BY MOUTH ONCE EVERY EVENING. 360 tablet 1    ARIPiprazole (ABILIFY) 5 MG tablet Take 1 tablet by mouth daily 90 tablet 1    topiramate (TOPAMAX) 50 MG tablet 1 tab every morning and 2 tabs in the afternoon. 270 tablet 1    atenolol (TENORMIN) 50 MG tablet Take 1.5 tablets by mouth daily 135 tablet 1    fluticasone (FLONASE) 50 MCG/ACT nasal spray 1 spray by Nasal route daily 3 Bottle 1    venlafaxine (EFFEXOR XR) 150 MG extended release capsule TAKE ONE CAPSULE BY MOUTH TWICE A  capsule 1    omeprazole (PRILOSEC) 20 MG delayed release capsule Take 2 capsules by mouth daily 180 capsule 1    Armodafinil (NUVIGIL) 150 MG TABS tablet Take 150 mg by mouth daily.  montelukast (SINGULAIR) 10 MG tablet Take 10 mg by mouth nightly      atorvastatin (LIPITOR) 80 MG tablet Take 1 tablet by mouth daily. 30 tablet 3    nitroGLYCERIN (NITROSTAT) 0.4 MG SL tablet Place 1 tablet under the tongue every 5 minutes as needed for Chest pain.  25 tablet 3 mouth daily     Dispense:  180 capsule     Refill:  1       Follow Up:  Return in about 6 months (around 9/2/2020).     Ania Graves MD

## 2020-04-28 ENCOUNTER — TELEPHONE (OUTPATIENT)
Dept: PULMONOLOGY | Age: 65
End: 2020-04-28

## 2020-04-28 RX ORDER — METHYLPHENIDATE HYDROCHLORIDE 10 MG/1
10 TABLET ORAL DAILY
Qty: 30 TABLET | Refills: 0 | Status: SHIPPED | OUTPATIENT
Start: 2020-04-28 | End: 2020-05-28

## 2020-05-04 ENCOUNTER — TELEPHONE (OUTPATIENT)
Dept: FAMILY MEDICINE CLINIC | Age: 65
End: 2020-05-04

## 2020-05-04 RX ORDER — TOPIRAMATE 50 MG/1
TABLET, FILM COATED ORAL
Qty: 270 TABLET | Refills: 1 | Status: SHIPPED | OUTPATIENT
Start: 2020-05-04 | End: 2021-03-15 | Stop reason: SDUPTHER

## 2020-05-04 RX ORDER — TIZANIDINE 4 MG/1
TABLET ORAL
Qty: 90 TABLET | Refills: 1 | Status: SHIPPED | OUTPATIENT
Start: 2020-05-04 | End: 2020-12-22

## 2020-05-06 ENCOUNTER — TELEPHONE (OUTPATIENT)
Dept: FAMILY MEDICINE CLINIC | Age: 65
End: 2020-05-06

## 2020-06-05 ENCOUNTER — HOSPITAL ENCOUNTER (OUTPATIENT)
Dept: ULTRASOUND IMAGING | Age: 65
Discharge: HOME | End: 2020-06-05
Payer: COMMERCIAL

## 2020-06-05 PROCEDURE — 76536 US EXAM OF HEAD AND NECK: CPT

## 2020-06-08 ENCOUNTER — TELEPHONE (OUTPATIENT)
Dept: FAMILY MEDICINE CLINIC | Age: 65
End: 2020-06-08

## 2020-06-23 ENCOUNTER — HOSPITAL ENCOUNTER (OUTPATIENT)
Dept: ULTRASOUND IMAGING | Age: 65
Discharge: HOME | End: 2020-06-23
Payer: COMMERCIAL

## 2020-06-23 PROCEDURE — 88173 CYTOPATH EVAL FNA REPORT: CPT

## 2020-06-23 PROCEDURE — 88305 TISSUE EXAM BY PATHOLOGIST: CPT

## 2020-06-23 PROCEDURE — 88172 CYTP DX EVAL FNA 1ST EA SITE: CPT

## 2020-06-23 PROCEDURE — 76942 ECHO GUIDE FOR BIOPSY: CPT

## 2020-06-29 ENCOUNTER — TELEPHONE (OUTPATIENT)
Dept: FAMILY MEDICINE CLINIC | Age: 65
End: 2020-06-29

## 2020-08-31 RX ORDER — ATENOLOL 50 MG/1
TABLET ORAL
Qty: 135 TABLET | Refills: 1 | Status: SHIPPED | OUTPATIENT
Start: 2020-08-31 | End: 2021-03-15 | Stop reason: SDUPTHER

## 2020-08-31 RX ORDER — METFORMIN HYDROCHLORIDE 500 MG/1
TABLET, EXTENDED RELEASE ORAL
Qty: 360 TABLET | Refills: 1 | Status: SHIPPED | OUTPATIENT
Start: 2020-08-31 | End: 2021-03-15 | Stop reason: SDUPTHER

## 2020-08-31 RX ORDER — OMEPRAZOLE 20 MG/1
CAPSULE, DELAYED RELEASE ORAL
Qty: 180 CAPSULE | Refills: 1 | Status: SHIPPED | OUTPATIENT
Start: 2020-08-31 | End: 2021-03-15

## 2020-08-31 RX ORDER — ARIPIPRAZOLE 5 MG/1
5 TABLET ORAL DAILY
Qty: 90 TABLET | Refills: 1 | Status: SHIPPED | OUTPATIENT
Start: 2020-08-31 | End: 2021-03-15 | Stop reason: SDUPTHER

## 2020-09-14 ENCOUNTER — OFFICE VISIT (OUTPATIENT)
Dept: PULMONOLOGY | Age: 65
End: 2020-09-14
Payer: COMMERCIAL

## 2020-09-14 ENCOUNTER — OFFICE VISIT (OUTPATIENT)
Dept: FAMILY MEDICINE CLINIC | Age: 65
End: 2020-09-14
Payer: COMMERCIAL

## 2020-09-14 VITALS
BODY MASS INDEX: 26.31 KG/M2 | SYSTOLIC BLOOD PRESSURE: 122 MMHG | WEIGHT: 173.6 LBS | OXYGEN SATURATION: 98 % | TEMPERATURE: 98 F | DIASTOLIC BLOOD PRESSURE: 78 MMHG | HEART RATE: 78 BPM | HEIGHT: 68 IN

## 2020-09-14 VITALS
WEIGHT: 174.6 LBS | SYSTOLIC BLOOD PRESSURE: 114 MMHG | TEMPERATURE: 98 F | BODY MASS INDEX: 26.46 KG/M2 | DIASTOLIC BLOOD PRESSURE: 77 MMHG | HEART RATE: 120 BPM | HEIGHT: 68 IN

## 2020-09-14 PROCEDURE — 99214 OFFICE O/P EST MOD 30 MIN: CPT | Performed by: FAMILY MEDICINE

## 2020-09-14 PROCEDURE — 99214 OFFICE O/P EST MOD 30 MIN: CPT | Performed by: PHYSICIAN ASSISTANT

## 2020-09-14 RX ORDER — METHYLPHENIDATE HYDROCHLORIDE 10 MG/1
10 TABLET ORAL 2 TIMES DAILY
Qty: 180 TABLET | Refills: 0 | Status: SHIPPED | OUTPATIENT
Start: 2020-09-14 | End: 2021-09-20 | Stop reason: SDUPTHER

## 2020-09-14 RX ORDER — METHYLPHENIDATE HYDROCHLORIDE 10 MG/1
10 TABLET ORAL 2 TIMES DAILY
COMMUNITY
End: 2020-09-14 | Stop reason: SDUPTHER

## 2020-09-14 RX ORDER — ARMODAFINIL 200 MG/1
200 TABLET ORAL DAILY
Qty: 90 TABLET | Refills: 1 | Status: SHIPPED | OUTPATIENT
Start: 2020-09-14 | End: 2021-06-09

## 2020-09-14 NOTE — PROGRESS NOTES
[address]  [address] [place], [address]  Ph:   [phone]  Fax: [phone]    Provider:  Dr. Juvenal Wilson    Patient:  Cosme Adan  YOB: 1955      Visit Date:  2020     Reason For Visit:   Chief Complaint   Patient presents with    Follow-up     HTN        Vj Murillo is a 60 y.o. female who comes today to the office for follow up of hypoplastic thyroid, depression, hypertension, hypercholesterolemia and fibromyalgia. She had cervical fusion in 2019. She is recuperating very well    Benign follicular nodule: biopsy done 2020, right thyroid nodule. MRI of cervical spine done in 2019 showed hypoplastic left thyroid nodule. US thyroid done in 2020 showed a solid heterogeneous vascular right thyroid lobe nodule which was low to intermediate suspicious due to its size mets criteria for ultrasound-guided fine needle aspiration which was done in . .      Depression: She is taking Abilify 5 mg every day and Effexor  mg 1 tablet PO BID. She denies any side effects from it. She feels that she is stable. Hyperlipidemia:   She is taking Lipitor 80 mg 1 tablet PO daily. She has low HDL and elevated LDL. She denies side efects from the medicines, but sometimes she has mild leg pain and wonders if it is related to the lipitor. She tries to follow a low cholesterol diet. Last blood test done in 2020  Showed a Total cholesterol 159, LDL 73, Triglycerides 02 and HDL 46. There is not a family history of hyperlipidemia. There is a family history of early ischemic heart disease. Paternal grandfather  in his 29's from massive heart attack and her uncle had a MI at 61. She does not exercise regularly, but she is very active.      HTN/CAD:  She is taking Cozaar 25 mg 1 tablet PO daily, Tenormin 50 mg 1 1/2 tablet PO daily. She tries to follow low cholesterol diet. She had a stent placed in 2015. She has been stable and asymptomatic.     Metabolic Syndrome:  She is taking Glucophage  mg 4 PO daily and denies any side effects from the medicine. Last HbA1C done Dece2019 was 6.0%. FMG: She is taking Effexor  mg 2 PO daily, Zanaflex and Ultram for it off and on. She takes the Ultram, but not very often. She takes about 1-2 per week. Vitamin D deficiency:  Last level done in 2019  was 20. She was taking calcium with vitamin D, but she stopped for a while. Significant Past Medical History:    Past Medical History:   Diagnosis Date    Asthma     Dr. Jorgito Novoa Depression       [facility] neck    Fibromyalgia    Brothers Can Hyperlipemia     check per Dr Marquise Ram Insulin resistance     Narcolepsy     per Dr. Dionicio Dinh via sleep study    Neck pain     cervical stenosis    PLMD (periodic limb movement disorder)     Prolonged emergence from general anesthesia     Tachycardia            Allergies:  is allergic to bactrim; iv dye [iodides]; tolectin [tolmetin sodium]; claforan [cefotaxime sodium in d5w]; and pcn [penicillins]. Medications:   Current Outpatient Medications   Medication Sig Dispense Refill    Armodafinil (NUVIGIL) 200 MG TABS Take 200 mg by mouth daily for 180 doses. 90 tablet 1    methylphenidate (RITALIN) 10 MG tablet Take 1 tablet by mouth 2 times daily for 90 days.  180 tablet 0    ARIPiprazole (ABILIFY) 5 MG tablet TAKE 1 TABLET BY MOUTH DAILY 90 tablet 1    atenolol (TENORMIN) 50 MG tablet TAKE 1 AND 1/2 TABLETS BY MOUTH DAILY 135 tablet 1    metFORMIN (GLUCOPHAGE-XR) 500 MG extended release tablet TAKE 4 TABLETS BY MOUTH ONCE EVERY EVENING 360 tablet 1    omeprazole (PRILOSEC) 20 MG delayed release capsule TAKE 2 CAPSULES BY MOUTH DAILY 180 capsule 1    tiZANidine (ZANAFLEX) 4 MG tablet TAKE 1 TABLET BY MOUTH EVERY NIGHT AT BEDTIME AS NEEDED 90 tablet 1    topiramate (TOPAMAX) 50 MG tablet TAKE 1 TABLET BY pain or edema  Dermatological ROS: negative for- rash    Physical Examination:  /77 (Site: Left Upper Arm, Position: Sitting, Cuff Size: Large Adult)   Pulse 120   Temp 98 °F (36.7 °C) (Temporal)   Ht 5' 8\" (1.727 m)   Wt 174 lb 9.6 oz (79.2 kg)   BMI 26.55 kg/m²     General-  Alert and oriented x 3, NAD  HEENT: NC, AT, PERRLA, EOMI, anicteric sclerae  Ears: Normal tympanic membranes bilaterally  Nose: patent, no lesions  Mouth: no lesions, moist mucosas  Neck - supple, no significant adenopathy  Chest - clear to auscultation, no wheezes, rales or rhonchi, symmetric air entry  Heart - normal rate, regular rhythm, normal S1, S2, no murmurs, rubs, clicks or gallops  Abdomen - soft, nontender, nondistended, no masses or organomegaly  Extremities - no pedal edema, no clubbing or cyanosis    Impression:   Diagnosis Orders   1. Essential hypertension  Urinalysis with Microscopic    Vitamin B12   2. Metabolic syndrome     3. Hypercholesterolemia  Lipid Panel    TSH without Reflex    Comprehensive Metabolic Panel   4. Vitamin D deficiency  Vitamin D 25 Hydroxy   5. Fibromyalgia     6. Moderate recurrent major depression ([address]     7. Thyroid nodule  US THYROID       Plan:    Continue  Effexor  mg 2 PO daily  Continue Abilify 5 mg 1 tablet PO daily. Continue Cozaar 25 mg 1 tablet PO daily  Continue Tenormin 50 mg 1 1/2 tablet PO daily. Continue  Glucophage  mg 4 tablets PO daily   Continue Topamax 50 mg 1 tablet by mouth in a.m., 2 tablets by mouth in the afternoon. Continue Prilosec 20 mg 1 tab by mouth daily. Continue vitamin D 2000 international units daily  US thyroid in 6 monthd      No orders of the defined types were placed in this encounter. Follow Up:  Return in about 6 months (around 3/14/2021).     Sandra Marin MD

## 2020-09-14 NOTE — PROGRESS NOTES
[facility]                                         [id number]  2020   Chief Complaint   Patient presents with    Follow-up     Narcolepsy 1 year follow up        Pt of Dr. Carly Lopez       Presentation:   Jodi Domínguez presents for sleep medicine follow up for narcolepsy  Since the last visit Jodi Domínguez has been doing reasonably well with Nuvigil. She only takes on days she works. She rarely takes Ritalin, only on severe days. She is still working 2-3 days a week. She is on [facility] for asthma and having increased LUNA. She is scheduled for PFT's. She uses Xopenex 1-2 times a week for LUNA. No wheeze, no chest tightness. Progress History:   Since last visit any new medical issues? No  New ER or hospitlal visits? No  Any new or changes in medicines? No  Any new sleep medicines? No    Sleep issues:  Do you feel better? Yes  More rested? Yes   Better concentration?  yes      Past Medical History:   Diagnosis Date    Asthma     Dr. Stephanie Bloom Depression       from [facility] neck    Fibromyalgia    Jerardo Garner Hyperlipemia     check per Dr Carina Moralez Insulin resistance     Narcolepsy 2000    per Dr. Rosa Lan via sleep study    Neck pain     cervical stenosis    PLMD (periodic limb movement disorder)     Prolonged emergence from general anesthesia     Tachycardia        Past Surgical History:   Procedure Laterality Date    ANKLE SURGERY Left 2017    [facility]    CARPAL TUNNEL RELEASE Right     CERVICAL DISCECTOMY  2012    C3-4, C4-5    CERVICAL FUSION N/A 2019    REMOVAL OF PLATE R1-9, ACDF I7-5, C6-7 WITH PLATING/MEDTRONIC performed by Ny Malin MD at [address] [place] [address]  2006   [address] WITH STENT PLACEMENT  2/12/15    Dr. Do Najera      FOOT SURGERY Right     bone removal from foot on rt    FOOT SURGERY Left     bone spur, tarsal tunnel and cyst removal on left    HYSTERECTOMY  1996    total, endometriosis    JOINT REPLACEMENT  2009    bilateral knee    LAPAROSCOPY  2010    for endometriosis    TONSILLECTOMY AND ADENOIDECTOMY  2006    UPPER GASTROINTESTINAL ENDOSCOPY  2006       Social History     Tobacco Use    Smoking status: Never Smoker    Smokeless tobacco: Never Used   Substance Use Topics    Alcohol use: Yes     Comment: rarely    Drug use: No       Allergies   Allergen Reactions    Bactrim Other (See Comments)     Metallic taste    Iv Dye [Iodides] Nausea Only and Other (See Comments)     Says older IVP dye causes Stomach pains    Tolectin [Tolmetin Sodium] Other (See Comments)     Metallic taste    Claforan [Cefotaxime Sodium In D5w] Hives and Rash    Pcn [Penicillins] Hives and Rash       Current Outpatient Medications   Medication Sig Dispense Refill    methylphenidate (RITALIN) 10 MG tablet Take 10 mg by mouth 2 times daily.  Armodafinil (NUVIGIL PO) Take by mouth      ARIPiprazole (ABILIFY) 5 MG tablet TAKE 1 TABLET BY MOUTH DAILY 90 tablet 1    atenolol (TENORMIN) 50 MG tablet TAKE 1 AND 1/2 TABLETS BY MOUTH DAILY 135 tablet 1    metFORMIN (GLUCOPHAGE-XR) 500 MG extended release tablet TAKE 4 TABLETS BY MOUTH ONCE EVERY EVENING 360 tablet 1    omeprazole (PRILOSEC) 20 MG delayed release capsule TAKE 2 CAPSULES BY MOUTH DAILY 180 capsule 1    tiZANidine (ZANAFLEX) 4 MG tablet TAKE 1 TABLET BY MOUTH EVERY NIGHT AT BEDTIME AS NEEDED 90 tablet 1    topiramate (TOPAMAX) 50 MG tablet TAKE 1 TABLET BY MOUTH EVERY MORNING AND TAKE 2 TABLETS BY MOUTH IN THE AFTERNOON 270 tablet 1    fluticasone (FLONASE) 50 MCG/ACT nasal spray 1 spray by Nasal route daily 3 Bottle 1    venlafaxine (EFFEXOR XR) 150 MG extended release capsule TAKE ONE CAPSULE BY MOUTH TWICE A  capsule 1    montelukast (SINGULAIR) 10 MG tablet Take 10 mg by mouth nightly      atorvastatin (LIPITOR) 80 MG tablet Take 1 tablet by mouth daily.  27 tablet 3    nitroGLYCERIN (NITROSTAT) 0.4 MG SL tablet Place 1 tablet under the tongue every 5 minutes as needed for Chest pain. 25 tablet 3    losartan (COZAAR) 25 MG tablet Take 1 tablet by mouth daily. (Patient taking differently: Take 25 mg by mouth nightly ) 30 tablet 3    Multiple Vitamins-Minerals (THERAPEUTIC MULTIVITAMIN-MINERALS) tablet Take 1 tablet by mouth daily.  Coenzyme Q10 (CO Q 10) 10 MG CAPS Take 1 capsule by mouth daily as needed       COCONUT OIL PO Take 2 capsules by mouth daily.  Mometasone Furo-Formoterol Fum (DULERA IN) Inhale  into the lungs 2 times daily.  Calcium Carbonate-Vitamin D (CALCIUM + D PO) Take 1,200 mg by mouth daily.  LYSINE Take 1,000 mg by mouth 2 times daily as needed       rizatriptan (MAXALT) 10 MG tablet Take 1 tablet by mouth once as needed for Migraine May repeat in 2 hours if needed 30 tablet 3     No current facility-administered medications for this visit.         Family History   Problem Relation Age of Onset    COPD Father     Heart Disease Father 61        ekg changes    Cancer Sister 50        Multiple malaran     Irritable Bowel Syndrome Sister     Arthritis Mother 48    COPD Maternal Aunt     Severe Sprains Maternal Uncle         epilepsy     Cancer Paternal Uncle 58        lung    Diabetes Maternal Grandmother     Kidney Disease Maternal Grandmother 72        Failure form DM     Cancer Maternal Grandfather         Mets all over     Coronary Art Dis Paternal Grandfather 35    Heart Disease Paternal Grandfather 35    Heart Attack Paternal Grandfather 35    Cancer Maternal Uncle         Lung     Stroke Maternal Uncle     Diabetes Maternal Uncle         NIDDM         Review of Systems -   General ROS: stable / unchanged  ENT ROS: negative for - nasal congestion, oral lesions or sore throat  Hematological andLymphatic ROS: negative  Endocrine ROS: negative  Respiratory ROS: +SOB  Cardiovascular ROS: no chest pain or dyspnea on exertion  Gastrointestinal ROS: no abdominal pain,change in bowel habits, or black or bloody stools  Musculoskeletal ROS: negative  Neurological ROS: negative    Physical Exam:    BMI:  Body mass index is 26.4 kg/m². Wt Readings from Last 3 Encounters:   09/14/20 173 lb 9.6 oz (78.7 kg)   03/02/20 174 lb 12.8 oz (79.3 kg)   12/18/19 168 lb 12.8 oz (76.6 kg)     Vitals: /78 (Site: Left Upper Arm, Position: Sitting, Cuff Size: Medium Adult)   Pulse 78   Temp 98 °F (36.7 °C)   Ht 5' 8\" (1.727 m)   Wt 173 lb 9.6 oz (78.7 kg)   SpO2 98% Comment: on RA  BMI 26.40 kg/m²       Physical Exam  Constitutional:       Appearance: She is well-developed. HENT:      Head: Normocephalic and atraumatic. Right Ear: External ear normal.      Left Ear: External ear normal.   Eyes:      Conjunctiva/sclera: Conjunctivae normal.      Pupils: Pupils are equal, round, and reactive to light. Neck:      Musculoskeletal: Normal range of motion and neck supple. Cardiovascular:      Rate and Rhythm: Normal rate and regular rhythm. Heart sounds: Normal heart sounds. Pulmonary:      Effort: Pulmonary effort is normal.      Breath sounds: Normal breath sounds. Musculoskeletal: Normal range of motion. Skin:     General: Skin is warm and dry. Neurological:      Mental Status: She is alert and oriented to person, place, and time. Psychiatric:         Behavior: Behavior normal.         Thought Content: Thought content normal.         Judgment: Judgment normal.          Assessment      Diagnosis Orders   1. Primary narcolepsy without cataplexy     2. Hypersomnia     3. Mild intermittent asthma without complication            Plan   - Will continue Nuvigil for hypersomnia  - Ritalin for break through.    - Will get PFT's results  - Wants to establish pulm here- will set up appt after PFT's  - She call my officefor earlier appointment if needed for worsening of sleep symptoms.   - She was instructed on weight loss  - Sheryl Layton was educated about my impression and plan. Patient verbalizes understanding.   We will see Cyndi Burroughs back in: 1 year and pulm follow up in few weeks    Mikael Alicea PA-C, [place]  9/14/2020

## 2020-09-16 ENCOUNTER — HOSPITAL ENCOUNTER (OUTPATIENT)
Age: 65
Discharge: HOME | End: 2020-09-16
Payer: COMMERCIAL

## 2020-09-16 PROCEDURE — U0003 INFECTIOUS AGENT DETECTION BY NUCLEIC ACID (DNA OR RNA); SEVERE ACUTE RESPIRATORY SYNDROME CORONAVIRUS 2 (SARS-COV-2) (CORONAVIRUS DISEASE [COVID-19]), AMPLIFIED PROBE TECHNIQUE, MAKING USE OF HIGH THROUGHPUT TECHNOLOGIES AS DESCRIBED BY CMS-2020-01-R: HCPCS

## 2020-09-18 LAB — SARS-COV-2: NOT DETECTED

## 2020-09-24 ENCOUNTER — HOSPITAL ENCOUNTER (OUTPATIENT)
Dept: PULMONOLOGY | Age: 65
Discharge: HOME | End: 2020-09-24
Payer: COMMERCIAL

## 2020-09-24 PROCEDURE — 94060 EVALUATION OF WHEEZING: CPT

## 2020-09-24 PROCEDURE — 94726 PLETHYSMOGRAPHY LUNG VOLUMES: CPT

## 2020-09-24 PROCEDURE — 94729 DIFFUSING CAPACITY: CPT

## 2020-09-24 NOTE — PROGRESS NOTES
Prescreening performed prior to testing. The following symptons may indicate COVID-19 infection:        One of the following criteria:   Temperature taken, patient temperature was 97.8 F. Fever greater 100.0 F -no  Cough -  no  New onset shortness of breath -no  New onset difficulty breathing -no        And/or   Two or more of the following criteria:  Muscle aches -no  Headache -no  Sore throat -no  New onset loss of smell/taste -no  New onset diarrhea -no    Patient's screening was negative. PFT will be performed.

## 2020-10-14 ENCOUNTER — OFFICE VISIT (OUTPATIENT)
Dept: PULMONOLOGY | Age: 65
End: 2020-10-14
Payer: COMMERCIAL

## 2020-10-14 VITALS
SYSTOLIC BLOOD PRESSURE: 116 MMHG | DIASTOLIC BLOOD PRESSURE: 72 MMHG | HEART RATE: 61 BPM | TEMPERATURE: 98.3 F | HEIGHT: 68 IN | OXYGEN SATURATION: 95 % | WEIGHT: 171.4 LBS | RESPIRATION RATE: 18 BRPM | BODY MASS INDEX: 25.98 KG/M2

## 2020-10-14 PROCEDURE — 99214 OFFICE O/P EST MOD 30 MIN: CPT | Performed by: NURSE PRACTITIONER

## 2020-10-14 RX ORDER — PREDNISONE 20 MG/1
40 TABLET ORAL DAILY
Qty: 10 TABLET | Refills: 0 | Status: SHIPPED | OUTPATIENT
Start: 2020-10-14 | End: 2020-10-19

## 2020-10-14 RX ORDER — LEVALBUTEROL TARTRATE 45 UG/1
2 AEROSOL, METERED ORAL EVERY 6 HOURS PRN
Qty: 1 INHALER | Refills: 11 | Status: SHIPPED | OUTPATIENT
Start: 2020-10-14 | End: 2021-10-19 | Stop reason: SDUPTHER

## 2020-10-14 RX ORDER — DOXYCYCLINE HYCLATE 100 MG
100 TABLET ORAL 2 TIMES DAILY
Qty: 10 TABLET | Refills: 0 | Status: SHIPPED | OUTPATIENT
Start: 2020-10-14 | End: 2020-10-19

## 2020-10-14 ASSESSMENT — ENCOUNTER SYMPTOMS
ALLERGIC/IMMUNOLOGIC NEGATIVE: 1
NAUSEA: 0
COUGH: 1
RHINORRHEA: 1
SINUS PRESSURE: 1
DIARRHEA: 1
SORE THROAT: 0
CHEST TIGHTNESS: 1
TROUBLE SWALLOWING: 0
SHORTNESS OF BREATH: 1
VOMITING: 0
WHEEZING: 0

## 2020-10-14 NOTE — PROGRESS NOTES
[facility]    Patient: Sharon Ruiz, 60 y.o.   : 1955  10/14/2020    Pt of Dr. Clarke Vidal   Patient presents with    Follow-up     Asthma per Devin Abel. Needs refills Dulera    Cough     2 weeks of congested cough pale yellow expectorant       HPI  Blue Burleson is a 61 yo female here to establish care for asthma. She sees Devin Abel PA-MICHAEL for narcolepsy and hypersomina. Her PMH significant for narcolepsy, PLMD, and asthma. She presents today with a productive cough with a 2 week duration. She was first diagnosed with asthma 4 years ago by Dr. Ben Choudhary. She's currently on Dulera BID and Singulair 10mg daily for her asthma, and uses her Xopenex twice a week currently, but only needed it once a month before she developed the cough. She states she's doing well on her inhalers. She's been having more dyspnea intermittently with exertion since July. She states she got short of breath 2-3 times a week from gardening or lifting, which she used to be able to do for twice as long as she does now. She states she hasn't been as active since getting the cough within the last two weeks, so she is not sure if she still gets similar dyspnea with exertion. Her symptoms are triggered by weather changes, humidity, perfume, cleaning supplies, and smoke. Rest and inhalers improve her symptoms. No one smokes in the household, although she was exposed to second hand smoke when she was younger father smoked \"couple PPD\" moved out age 23. She denies recent travel. Her symptoms do not interfere with daily activities. She endorses a productive cough for 2 weeks with white/yellow sputum, denies hemoptysis. She also endorses wheezing about once a day currently after a coughing fit, and also chest tightness with the cough. She denies dyspnea on exertion. She endorses sinus pain.       Asthma control (Severity) questionnaire:  Asthma symptoms:  > 2 times per week:  Yes with cough last couple of weeks  Night time awakenings: > 2 times per month: No   Use of ILAN for symptoms control (Other than pre exercise use):> 2times per week: Yes twice in the past 2 weeks  Interference with normal activity: No  Lung function: Fev1 >60% Predicted: No    Number of exacerbations per year: > 2: No      Progress History:   Since last visit any new medical issues? No  New ER or hospital visits? No  Any new or changes in medicines? No  Using inhalers? Yes Alize Bottcher, and xoponex  Are they helpful? Yes   Flu vaccine? Not yet, planning on waiting until the cough resolves  Pneumonia vaccine?  Yes  Past Medical hx   PMH:  Past Medical History:   Diagnosis Date    Asthma     Dr. Hali Holter Depression       [facility] neck    Fibromyalgia 2000   Ingrid Medinagomery Hyperlipemia     check per Dr Mary Jo Marquez Insulin resistance     Narcolepsy 2000    per Dr. Manjinder Bradshaw via sleep study    Neck pain     cervical stenosis    PLMD (periodic limb movement disorder)     Prolonged emergence from general anesthesia     Tachycardia      SURGICAL HISTORY:  Past Surgical History:   Procedure Laterality Date    ANKLE SURGERY Left 2017    [facility]    CARPAL TUNNEL RELEASE Right     CERVICAL DISCECTOMY  2012    C3-4, C4-5    CERVICAL FUSION N/A 2019    REMOVAL OF PLATE E2-0, ACDF S3-3, C6-7 WITH PLATING/MEDTRONIC performed by Kirill Martínez MD at [address] [address]   [address] WITH STENT PLACEMENT  2/12/15    Dr. Gail Ruiz      FOOT SURGERY Right     bone removal from foot on rt    FOOT SURGERY Left     bone spur, tarsal tunnel and cyst removal on left    HYSTERECTOMY  1996    total, endometriosis    JOINT REPLACEMENT  2009    bilateral knee    LAPAROSCOPY  2010    for endometriosis    TONSILLECTOMY AND ADENOIDECTOMY  2006    UPPER GASTROINTESTINAL ENDOSCOPY  2006     SOCIAL HISTORY:  Social History Tobacco Use    Smoking status: Never Smoker    Smokeless tobacco: Never Used   Substance Use Topics    Alcohol use: Yes     Comment: rarely    Drug use: No     ALLERGIES:  Allergies   Allergen Reactions    Bactrim Other (See Comments)     Metallic taste    Iv Dye [Iodides] Nausea Only and Other (See Comments)     Says older IVP dye causes Stomach pains    Tolectin [Tolmetin Sodium] Other (See Comments)     Metallic taste    Claforan [Cefotaxime Sodium In D5w] Hives and Rash    Pcn [Penicillins] Hives and Rash     FAMILY HISTORY:  Family History   Problem Relation Age of Onset    COPD Father     Heart Disease Father 61        ekg changes    Cancer Sister 50        Multiple malaran     Irritable Bowel Syndrome Sister     Arthritis Mother 48    COPD Maternal Aunt     Severe Sprains Maternal Uncle         epilepsy     Cancer Paternal Uncle 58        lung    Diabetes Maternal Grandmother     Kidney Disease Maternal Grandmother 72        Failure form DM     Cancer Maternal Grandfather         Mets all over     Coronary Art Dis Paternal Grandfather 35    Heart Disease Paternal Grandfather 35    Heart Attack Paternal Grandfather 35    Cancer Maternal Uncle         Lung     Stroke Maternal Uncle     Diabetes Maternal Uncle         NIDDM      CURRENT MEDICATIONS:  Current Outpatient Medications   Medication Sig Dispense Refill    mometasone-formoterol (DULERA) 100-5 MCG/ACT inhaler Inhale 2 puffs into the lungs 2 times daily 1 Inhaler 11    levalbuterol (XOPENEX HFA) 45 MCG/ACT inhaler Inhale 2 puffs into the lungs every 6 hours as needed for Wheezing or Shortness of Breath 1 Inhaler 11    doxycycline hyclate (VIBRA-TABS) 100 MG tablet Take 1 tablet by mouth 2 times daily for 5 days 10 tablet 0    predniSONE (DELTASONE) 20 MG tablet Take 2 tablets by mouth daily for 5 days 10 tablet 0    Armodafinil (NUVIGIL) 200 MG TABS Take 200 mg by mouth daily for 180 doses.  90 tablet 1    methylphenidate (RITALIN) 10 MG tablet Take 1 tablet by mouth 2 times daily for 90 days. 180 tablet 0    ARIPiprazole (ABILIFY) 5 MG tablet TAKE 1 TABLET BY MOUTH DAILY 90 tablet 1    atenolol (TENORMIN) 50 MG tablet TAKE 1 AND 1/2 TABLETS BY MOUTH DAILY 135 tablet 1    metFORMIN (GLUCOPHAGE-XR) 500 MG extended release tablet TAKE 4 TABLETS BY MOUTH ONCE EVERY EVENING 360 tablet 1    omeprazole (PRILOSEC) 20 MG delayed release capsule TAKE 2 CAPSULES BY MOUTH DAILY 180 capsule 1    tiZANidine (ZANAFLEX) 4 MG tablet TAKE 1 TABLET BY MOUTH EVERY NIGHT AT BEDTIME AS NEEDED 90 tablet 1    topiramate (TOPAMAX) 50 MG tablet TAKE 1 TABLET BY MOUTH EVERY MORNING AND TAKE 2 TABLETS BY MOUTH IN THE AFTERNOON 270 tablet 1    fluticasone (FLONASE) 50 MCG/ACT nasal spray 1 spray by Nasal route daily 3 Bottle 1    venlafaxine (EFFEXOR XR) 150 MG extended release capsule TAKE ONE CAPSULE BY MOUTH TWICE A  capsule 1    montelukast (SINGULAIR) 10 MG tablet Take 10 mg by mouth nightly      atorvastatin (LIPITOR) 80 MG tablet Take 1 tablet by mouth daily. 30 tablet 3    nitroGLYCERIN (NITROSTAT) 0.4 MG SL tablet Place 1 tablet under the tongue every 5 minutes as needed for Chest pain. 25 tablet 3    losartan (COZAAR) 25 MG tablet Take 1 tablet by mouth daily. (Patient taking differently: Take 25 mg by mouth nightly ) 30 tablet 3    Multiple Vitamins-Minerals (THERAPEUTIC MULTIVITAMIN-MINERALS) tablet Take 1 tablet by mouth daily.  Coenzyme Q10 (CO Q 10) 10 MG CAPS Take 1 capsule by mouth daily as needed       COCONUT OIL PO Take 2 capsules by mouth daily.  Calcium Carbonate-Vitamin D (CALCIUM + D PO) Take 1,200 mg by mouth daily.  LYSINE Take 1,000 mg by mouth 2 times daily as needed       rizatriptan (MAXALT) 10 MG tablet Take 1 tablet by mouth once as needed for Migraine May repeat in 2 hours if needed 30 tablet 3     No current facility-administered medications for this visit. Cecy Olson     ROS rhythm. Pulses: Normal pulses. Heart sounds: No murmur. No friction rub. No gallop. Pulmonary:      Effort: Pulmonary effort is normal.      Breath sounds: Normal breath sounds. Comments: Rhonchi with cough only  Abdominal:      General: Bowel sounds are normal.   Musculoskeletal: Normal range of motion. Left lower leg: Edema present. Skin:     General: Skin is warm. Neurological:      General: No focal deficit present. Mental Status: She is alert and oriented to person, place, and time. Psychiatric:         Mood and Affect: Mood normal.         Behavior: Behavior normal.         Thought Content: Thought content normal.          Results   Lung Nodule Screening     [] Qualifies    [x] Does not qualify   [] Declined    [] Completed  Never smoker, passive exposure   The USPSTF recommends annual screening for lung cancer with low-dose computed tomography (LDCT) in adults aged 54 to 80 years who have a 30 pack-year smoking history and currently smoke or have quit within the past 15 years. Screening should be discontinued once a person has not smoked for 15 years or develops a health problem that substantially limits life expectancy or the ability or willingness to have curative lung surgery. PFTs  9/24/2020      Assessment      Diagnosis Orders   1. Mild persistent asthma with acute exacerbation  mometasone-formoterol (DULERA) 100-5 MCG/ACT inhaler    levalbuterol (XOPENEX HFA) 45 MCG/ACT inhaler    predniSONE (DELTASONE) 20 MG tablet   2.  Acute bacterial rhinosinusitis  doxycycline hyclate (VIBRA-TABS) 100 MG tablet         Plan   -Reviewed spirometry with patient very mild COPD on spiroemtry was obtain around time she began having more difficulty breathing, question whether true representation of baseline pulmonary function  -Advised to continue to take Dulera BID, Singulair 10mg daily, and Xopenex PRN as prescribed  -Will treat for bacterial sinusitis given length of time, productive cough, and initial subjective chills doxycycline 100 mg po BID for 5 days, advised to call office if she does not feel better after the fifth day, noted penicillin allergy  -Advised to take prednisone 40mg po daily for five days, for exacerbation of asthma due to rhinosinusitis with PND  -Will see back in 3 months for exhaled nitric oxide testing after the resolution of her acute bacterial rhinosinusitis to determine if asthma therapy needs to be adjusted  -Advised to maintain pneumonia vaccine with PCP and to take flu vaccine this coming season.  -Advised patient to call office with any changes, questions, or concerns regarding respiratory status    Will see Darin Fitch back in: 3 months    Rabia Alicea CNP  10/14/2020

## 2020-10-23 ENCOUNTER — HOSPITAL ENCOUNTER (OUTPATIENT)
Dept: CT IMAGING | Age: 65
Discharge: HOME | End: 2020-10-23
Payer: COMMERCIAL

## 2020-10-23 PROCEDURE — 73700 CT LOWER EXTREMITY W/O DYE: CPT

## 2020-11-02 RX ORDER — VENLAFAXINE HYDROCHLORIDE 150 MG/1
CAPSULE, EXTENDED RELEASE ORAL
Qty: 180 CAPSULE | Refills: 1 | Status: SHIPPED | OUTPATIENT
Start: 2020-11-02 | End: 2021-03-15 | Stop reason: SDUPTHER

## 2020-11-23 ENCOUNTER — OFFICE VISIT (OUTPATIENT)
Dept: FAMILY MEDICINE CLINIC | Age: 65
End: 2020-11-23
Payer: COMMERCIAL

## 2020-11-23 VITALS
DIASTOLIC BLOOD PRESSURE: 83 MMHG | RESPIRATION RATE: 10 BRPM | WEIGHT: 169.8 LBS | HEART RATE: 82 BPM | SYSTOLIC BLOOD PRESSURE: 122 MMHG | TEMPERATURE: 97.3 F | HEIGHT: 68 IN | BODY MASS INDEX: 25.73 KG/M2

## 2020-11-23 PROCEDURE — 99214 OFFICE O/P EST MOD 30 MIN: CPT | Performed by: FAMILY MEDICINE

## 2020-11-23 NOTE — PROGRESS NOTES
[facility]  [facility], [address]  Ph:   [phone]  Fax: [phone]    Provider:  Dr. Sis Jones          Chief Complaint   [facility]   [facility]     Ms. Fitch was referred to me by Dr. Kris Lynch for pre-op evaluation prior to left ankle arthroplasty [employer] total ankle which is scheduled for 2020 at [facility]    History of hypertension, coronary artery disease, hyper lipidemia, metabolic syndrome, and depression. She is taking Cozaar 25 mg 1 tablet daily, Tenormin 50 mg 1/2 tablet p.o. daily, Lipitor 80 mg 1 tablet mouth daily, Glucophage  mg 4 tablet mouth daily, Abilify 5 mg 1 tablet mouth daily and Effexor  mg 1 tablet mouth twice daily. She is getting a cardiac clearance from her cardiologist, Dr. Saumya Cervantes. Her blood pressure is well controlled, today was 122/83. With regard to her depression she states she is doing very well and is stable. Last HbA1C done in 2019 was 6%. She is taking calcium with vitamin D 5000 U 1 TABLET PO daily.       Past Medical History:   Diagnosis Date    Asthma     Dr. Katelyn Restrepo Depression       [facility] neck    [facility]     check per Dr Landy Rodriguez Insulin resistance     Narcolepsy 2000    per Dr. Saumya Cervantes via sleep study    Neck pain     cervical stenosis    PLMD (periodic limb movement disorder)     Prolonged emergence from general anesthesia     Tachycardia        Past Surgical History:   Procedure Laterality Date    ANKLE SURGERY Left 2017    [facility]    CARPAL TUNNEL RELEASE Right     CERVICAL DISCECTOMY  2012    C3-4, C4-5    CERVICAL FUSION N/A 2019    REMOVAL OF PLATE L9-5, ACDF X4-7, C6-7 WITH PLATING/MEDTRONIC performed by Nadeem Dixon MD at [place] [address]     [address] STENT PLACEMENT  2/12/15    Dr. Naseem Benitez      FOOT SURGERY Right 2011    bone removal from foot on rt    FOOT SURGERY Left 2011    bone spur, tarsal tunnel and cyst removal on left    HYSTERECTOMY  1996    total, endometriosis    JOINT REPLACEMENT  2009    bilateral knee    LAPAROSCOPY  2010    for endometriosis    TONSILLECTOMY AND ADENOIDECTOMY  2006    UPPER GASTROINTESTINAL ENDOSCOPY  2006       Social History     Socioeconomic History    Marital status:       Spouse name: Not on file    Number of children: 3    Years of education: 15    Highest education level: Not on file   Occupational History    Occupation: RN     Employer: [employer]     Comment: [employer]   Social Needs    Financial resource strain: Not on file    Food insecurity     Worry: Not on file     Inability: Not on file    Transportation needs     Medical: Not on file     Non-medical: Not on file   Tobacco Use    Smoking status: Never Smoker    Smokeless tobacco: Never Used   Substance and Sexual Activity    Alcohol use: Yes     Comment: rarely    Drug use: No    Sexual activity: Not Currently   Lifestyle    Physical activity     Days per week: Not on file     Minutes per session: Not on file    Stress: Not on file   Relationships    Social connections     Talks on phone: Not on file     Gets together: Not on file     Attends [employer] service: Not on file     Active member of club or organization: Not on file     Attends meetings of clubs or organizations: Not on file     Relationship status: Not on file    Intimate partner violence     Fear of current or ex partner: Not on file     Emotionally abused: Not on file     Physically abused: Not on file     Forced sexual activity: Not on file   Other Topics Concern    Not on file   Social History Narrative    Not on file       Current Outpatient Medications   Medication Sig Dispense Refill    venlafaxine (EFFEXOR XR) 150 MG extended release capsule TAKE 1 CAPSULE BY MOUTH 2 TIMES A  capsule 1    mometasone-formoterol (DULERA) 100-5 MCG/ACT inhaler Inhale 2 puffs into the lungs 2 times daily 1 Inhaler 11    levalbuterol (XOPENEX HFA) 45 MCG/ACT inhaler Inhale 2 puffs into the lungs every 6 hours as needed for Wheezing or Shortness of Breath 1 Inhaler 11    Armodafinil (NUVIGIL) 200 MG TABS Take 200 mg by mouth daily for 180 doses. 90 tablet 1    methylphenidate (RITALIN) 10 MG tablet Take 1 tablet by mouth 2 times daily for 90 days. 180 tablet 0    ARIPiprazole (ABILIFY) 5 MG tablet TAKE 1 TABLET BY MOUTH DAILY 90 tablet 1    atenolol (TENORMIN) 50 MG tablet TAKE 1 AND 1/2 TABLETS BY MOUTH DAILY 135 tablet 1    metFORMIN (GLUCOPHAGE-XR) 500 MG extended release tablet TAKE 4 TABLETS BY MOUTH ONCE EVERY EVENING 360 tablet 1    omeprazole (PRILOSEC) 20 MG delayed release capsule TAKE 2 CAPSULES BY MOUTH DAILY 180 capsule 1    tiZANidine (ZANAFLEX) 4 MG tablet TAKE 1 TABLET BY MOUTH EVERY NIGHT AT BEDTIME AS NEEDED 90 tablet 1    topiramate (TOPAMAX) 50 MG tablet TAKE 1 TABLET BY MOUTH EVERY MORNING AND TAKE 2 TABLETS BY MOUTH IN THE AFTERNOON 270 tablet 1    fluticasone (FLONASE) 50 MCG/ACT nasal spray 1 spray by Nasal route daily 3 Bottle 1    montelukast (SINGULAIR) 10 MG tablet Take 10 mg by mouth nightly      atorvastatin (LIPITOR) 80 MG tablet Take 1 tablet by mouth daily. 30 tablet 3    nitroGLYCERIN (NITROSTAT) 0.4 MG SL tablet Place 1 tablet under the tongue every 5 minutes as needed for Chest pain. 25 tablet 3    losartan (COZAAR) 25 MG tablet Take 1 tablet by mouth daily. (Patient taking differently: Take 25 mg by mouth nightly ) 30 tablet 3    Multiple Vitamins-Minerals (THERAPEUTIC MULTIVITAMIN-MINERALS) tablet Take 1 tablet by mouth daily.  Coenzyme Q10 (CO Q 10) 10 MG CAPS Take 1 capsule by mouth daily as needed       COCONUT OIL PO Take 2 capsules by mouth daily.  Calcium Carbonate-Vitamin D (CALCIUM + D PO) Take 1,200 mg by mouth daily.         LYSINE Take 1,000 mg by mouth 2 times daily as needed       rizatriptan (MAXALT) 10 MG tablet Take 1 tablet by mouth once as needed for Migraine May repeat in 2 hours if needed 30 tablet 3     No current facility-administered medications for this visit. Allergies   Allergen Reactions    Bactrim Other (See Comments)     Metallic taste    Iv Dye [Iodides] Nausea Only and Other (See Comments)     Says older IVP dye causes Stomach pains    Tolectin [Tolmetin Sodium] Other (See Comments)     Metallic taste    Claforan [Cefotaxime Sodium In D5w] Hives and Rash    Pcn [Penicillins] Hives and Rash       Review of Systems -   General ROS: negative  Psychological ROS: negative  Hematological and Lymphatic ROS: No history of blood clots or bleeding disorder. Respiratory ROS: no cough, shortness of breath, or wheezing  Cardiovascular ROS: no chest pain or dyspnea on exertion  Gastrointestinal ROS: no abdominal pain, change in bowel habits, or black or bloody stools  Genito-Urinary ROS: no dysuria, trouble voiding, or hematuria  Musculoskeletal ROS: negative  Neurological ROS: no TIA or stroke symptoms  Dermatological ROS: negative      Reactions to anesthesia: none    History of excessive bleeding: none    History of blood clots: none    History of blood transfusions: none      Reactions to blood transfusion: none       Vitals:    11/23/20 1021   BP: 122/83   Site: Left Upper Arm   Position: Sitting   Cuff Size: Medium Adult   Pulse: 82   Resp: 10   Temp: 97.3 °F (36.3 °C)   TempSrc: Temporal   Weight: 169 lb 12.8 oz (77 kg)   Height: 5' 7.5\" (1.715 m)       Physical Examination:   General appearance - alert, well appearing, and in no distress  Mental status - alert, oriented to person, place, and time  Neck - Neck is supple, no JVD or carotid bruits. No thyromegaly or adenopathy.    Chest - clear to auscultation, no wheezes, rales or rhonchi, symmetric air entry  Heart - normal rate, regular rhythm, normal S1, S2, no murmurs, rubs, clicks or gallops  Abdomen - soft, nontender, nondistended, no masses or organomegaly  Neurological - alert, oriented, normal speech, no focal findings or movement disorder noted  Musculoskeletal - no joint tenderness, deformity or swelling  Extremities - peripheral pulses normal, no pedal edema, no clubbing or cyanosis  Skin - normal coloration and turgor, no rashes, no suspicious skin lesions noted    Assessment:      Diagnosis Orders   1. Pre-op evaluation     2. Osteopenia of left foot  DEXA BONE DENSITY 2 SITES   3. Essential hypertension  CBC Auto Differential    Comprehensive Metabolic Panel    Urinalysis with Microscopic    Vitamin B12   4. Arthritis of left ankle     5. Metabolic syndrome     6. Hypercholesterolemia  Lipid Panel   7. Vitamin D deficiency  Comprehensive Metabolic Panel    Vitamin D 25 Hydroxy       Plan:    Orders Placed This Encounter   Procedures    DEXA BONE DENSITY 2 SITES     Standing Status:   Future     Standing Expiration Date:   11/23/2021    Lipid Panel     Standing Status:   Future     Standing Expiration Date:   11/23/2021     Order Specific Question:   Is Patient Fasting?/# of Hours     Answer:   12 hours    CBC Auto Differential     Standing Status:   Future     Standing Expiration Date:   11/23/2021    Comprehensive Metabolic Panel     Standing Status:   Future     Standing Expiration Date:   11/23/2021    Vitamin D 25 Hydroxy     Standing Status:   Future     Standing Expiration Date:   11/23/2021    Urinalysis with Microscopic     Standing Status:   Future     Standing Expiration Date:   11/23/2021     Order Specific Question:   SPECIFY(EX-CATH,MIDSTREAM,CYSTO,ETC)? Answer:   midstream    Vitamin B12     Standing Status:   Future     Standing Expiration Date:   11/23/2021        There are no contraindications to proceed with surgery. Acceptable risk from a cardiopulmonary/medical standpoint. Will notify referring surgeon.  Recommend routine DVT/PE prophylaxis as per surgery. Ms. Radha Moran has been advised to stop all over the counter medication/vitamins/supplements/herbs 10 days before the procedure.       Belem De Luna MD

## 2020-11-24 ENCOUNTER — NURSE ONLY (OUTPATIENT)
Dept: LAB | Age: 65
End: 2020-11-24

## 2020-11-24 LAB
ALBUMIN SERPL-MCNC: 4.4 G/DL (ref 3.5–5.1)
ALP BLD-CCNC: 90 U/L (ref 38–126)
ALT SERPL-CCNC: 12 U/L (ref 11–66)
ANION GAP SERPL CALCULATED.3IONS-SCNC: 12 MEQ/L (ref 8–16)
ANISOCYTOSIS: PRESENT
AST SERPL-CCNC: 16 U/L (ref 5–40)
BACTERIA: ABNORMAL
BASOPHILS # BLD: 2 %
BASOPHILS ABSOLUTE: 0.2 THOU/MM3 (ref 0–0.1)
BILIRUB SERPL-MCNC: 0.3 MG/DL (ref 0.3–1.2)
BILIRUBIN URINE: NEGATIVE
BLOOD, URINE: NEGATIVE
BUN BLDV-MCNC: 12 MG/DL (ref 7–22)
CALCIUM SERPL-MCNC: 10 MG/DL (ref 8.5–10.5)
CASTS: ABNORMAL /LPF
CASTS: ABNORMAL /LPF
CHARACTER, URINE: CLEAR
CHLORIDE BLD-SCNC: 107 MEQ/L (ref 98–111)
CHOLESTEROL, TOTAL: 155 MG/DL (ref 100–199)
CO2: 24 MEQ/L (ref 23–33)
COLOR: YELLOW
CREAT SERPL-MCNC: 0.9 MG/DL (ref 0.4–1.2)
CRYSTALS: ABNORMAL
EOSINOPHIL # BLD: 5.3 %
EOSINOPHILS ABSOLUTE: 0.5 THOU/MM3 (ref 0–0.4)
EPITHELIAL CELLS, UA: ABNORMAL /HPF
ERYTHROCYTE [DISTWIDTH] IN BLOOD BY AUTOMATED COUNT: 20.4 % (ref 11.5–14.5)
ERYTHROCYTE [DISTWIDTH] IN BLOOD BY AUTOMATED COUNT: 56.3 FL (ref 35–45)
FERRITIN: 14 NG/ML (ref 10–291)
FOLATE: > 20 NG/ML (ref 4.8–24.2)
GFR SERPL CREATININE-BSD FRML MDRD: 63 ML/MIN/1.73M2
GLUCOSE BLD-MCNC: 106 MG/DL (ref 70–108)
GLUCOSE, URINE: NEGATIVE MG/DL
HCT VFR BLD CALC: 39.3 % (ref 37–47)
HDLC SERPL-MCNC: 43 MG/DL
HEMOGLOBIN: 11.2 GM/DL (ref 12–16)
HYPOCHROMIA: PRESENT
IMMATURE GRANS (ABS): 0.04 THOU/MM3 (ref 0–0.07)
IMMATURE GRANULOCYTES: 0.5 %
KETONES, URINE: NEGATIVE
LDL CHOLESTEROL CALCULATED: 71 MG/DL
LEUKOCYTE ESTERASE, URINE: ABNORMAL
LYMPHOCYTES # BLD: 24.3 %
LYMPHOCYTES ABSOLUTE: 2.1 THOU/MM3 (ref 1–4.8)
MCH RBC QN AUTO: 22.6 PG (ref 26–33)
MCHC RBC AUTO-ENTMCNC: 28.5 GM/DL (ref 32.2–35.5)
MCV RBC AUTO: 79.2 FL (ref 81–99)
MISCELLANEOUS LAB TEST RESULT: ABNORMAL
MONOCYTES # BLD: 6.7 %
MONOCYTES ABSOLUTE: 0.6 THOU/MM3 (ref 0.4–1.3)
NITRITE, URINE: NEGATIVE
NUCLEATED RED BLOOD CELLS: 0 /100 WBC
PH UA: 5.5 (ref 5–9)
PLATELET # BLD: 479 THOU/MM3 (ref 130–400)
PLATELET ESTIMATE: ABNORMAL
PMV BLD AUTO: 9.6 FL (ref 9.4–12.4)
POIKILOCYTES: ABNORMAL
POTASSIUM SERPL-SCNC: 4.7 MEQ/L (ref 3.5–5.2)
PROTEIN UA: ABNORMAL MG/DL
RBC # BLD: 4.96 MILL/MM3 (ref 4.2–5.4)
RBC URINE: ABNORMAL /HPF
RENAL EPITHELIAL, UA: ABNORMAL
SCAN OF BLOOD SMEAR: NORMAL
SEG NEUTROPHILS: 61.2 %
SEGMENTED NEUTROPHILS ABSOLUTE COUNT: 5.3 THOU/MM3 (ref 1.8–7.7)
SODIUM BLD-SCNC: 143 MEQ/L (ref 135–145)
SPECIFIC GRAVITY UA: 1.02 (ref 1–1.03)
T4 FREE: 1.04 NG/DL (ref 0.93–1.76)
TOTAL PROTEIN: 6.7 G/DL (ref 6.1–8)
TRIGL SERPL-MCNC: 203 MG/DL (ref 0–199)
TSH SERPL DL<=0.05 MIU/L-ACNC: 0.49 UIU/ML (ref 0.4–4.2)
UROBILINOGEN, URINE: 0.2 EU/DL (ref 0–1)
VITAMIN B-12: > 2000 PG/ML (ref 211–911)
VITAMIN D 25-HYDROXY: 45 NG/ML (ref 30–100)
WBC # BLD: 8.7 THOU/MM3 (ref 4.8–10.8)
WBC UA: ABNORMAL /HPF
YEAST: ABNORMAL

## 2020-12-03 ENCOUNTER — TELEPHONE (OUTPATIENT)
Dept: FAMILY MEDICINE CLINIC | Age: 65
End: 2020-12-03

## 2020-12-03 NOTE — TELEPHONE ENCOUNTER
----- Message from Winnie Huff MA sent at 12/3/2020  8:44 AM EST -----    ----- Message -----  From: Mikayla Man MD  Sent: 12/2/2020   7:17 PM EST  To: [facility]    Vitamin D back to normal, TSH normal, ferritin 14, normal.  Comprehensive panel within acceptable ranges. Lipid profile showed total cholesterol 155, triglycerides 203, HDL elevated. This can be held increasing omega-3 fatty acids in the diet either by supplementation or eating more fish.   Will recheck in 4 months

## 2020-12-03 NOTE — LETTER
[address]  [address]  [address] [address]  Phone: [phone]  Fax: [phone]    Shawn Angeles MD        January 18, 2021    [facility] [id number]      Dear Meka Nolasco: Our office has been unable to contact you via phone/ mychart. Please contact the office at [phone] to discuss test results.      Thanks  Lanette Hewitt

## 2020-12-04 ENCOUNTER — HOSPITAL ENCOUNTER (OUTPATIENT)
Age: 65
Discharge: HOME | End: 2020-12-04
Payer: COMMERCIAL

## 2020-12-04 PROCEDURE — U0003 INFECTIOUS AGENT DETECTION BY NUCLEIC ACID (DNA OR RNA); SEVERE ACUTE RESPIRATORY SYNDROME CORONAVIRUS 2 (SARS-COV-2) (CORONAVIRUS DISEASE [COVID-19]), AMPLIFIED PROBE TECHNIQUE, MAKING USE OF HIGH THROUGHPUT TECHNOLOGIES AS DESCRIBED BY CMS-2020-01-R: HCPCS

## 2020-12-06 LAB — SARS-COV-2: NOT DETECTED

## 2020-12-08 ENCOUNTER — HOSPITAL ENCOUNTER (OUTPATIENT)
Dept: NUCLEAR MEDICINE | Age: 65
Discharge: HOME | End: 2020-12-08
Payer: COMMERCIAL

## 2020-12-08 PROCEDURE — A9541 TC99M SULFUR COLLOID: HCPCS | Performed by: INTERNAL MEDICINE

## 2020-12-08 PROCEDURE — 3430000000 HC RX DIAGNOSTIC RADIOPHARMACEUTICAL: Performed by: INTERNAL MEDICINE

## 2020-12-08 PROCEDURE — 78264 GASTRIC EMPTYING IMG STUDY: CPT

## 2020-12-08 RX ADMIN — Medication 1 MILLICURIE: at 08:32

## 2020-12-22 RX ORDER — TIZANIDINE 4 MG/1
TABLET ORAL
Qty: 90 TABLET | Refills: 1 | Status: SHIPPED | OUTPATIENT
Start: 2020-12-22 | End: 2021-07-19

## 2020-12-23 ENCOUNTER — HOSPITAL ENCOUNTER (OUTPATIENT)
Dept: WOMENS IMAGING | Age: 65
Discharge: HOME | End: 2020-12-23
Payer: COMMERCIAL

## 2020-12-23 PROCEDURE — 77080 DXA BONE DENSITY AXIAL: CPT

## 2020-12-30 ENCOUNTER — TELEPHONE (OUTPATIENT)
Dept: FAMILY MEDICINE CLINIC | Age: 65
End: 2020-12-30

## 2020-12-30 DIAGNOSIS — Z11.59 SCREENING FOR VIRAL DISEASE: Primary | ICD-10-CM

## 2020-12-30 NOTE — TELEPHONE ENCOUNTER
Luis Enrique Mcfarlane MD   12/28/2020  9:35 AM EST      Her bone density showed osteopenia placing her at medium risk for fracture. .  We need to make sure that she is taking about 1200 mg of calcium a day +800 vitamin D.  Unless she has been deficient and is taking higher dose.

## 2021-02-01 ENCOUNTER — OFFICE VISIT (OUTPATIENT)
Dept: PULMONOLOGY | Age: 66
End: 2021-02-01
Payer: COMMERCIAL

## 2021-02-01 VITALS
SYSTOLIC BLOOD PRESSURE: 128 MMHG | WEIGHT: 166 LBS | HEIGHT: 67 IN | TEMPERATURE: 97.7 F | BODY MASS INDEX: 26.06 KG/M2 | DIASTOLIC BLOOD PRESSURE: 80 MMHG | HEART RATE: 74 BPM | OXYGEN SATURATION: 97 %

## 2021-02-01 DIAGNOSIS — J45.30 MILD PERSISTENT ASTHMA WITHOUT COMPLICATION: Primary | ICD-10-CM

## 2021-02-01 PROCEDURE — 99214 OFFICE O/P EST MOD 30 MIN: CPT | Performed by: NURSE PRACTITIONER

## 2021-02-01 ASSESSMENT — ENCOUNTER SYMPTOMS
SPUTUM PRODUCTION: 1
STRIDOR: 0
COUGH: 1
HEMOPTYSIS: 0
WHEEZING: 0
CHEST TIGHTNESS: 0
DIARRHEA: 0
SHORTNESS OF BREATH: 1
NAUSEA: 0
VOMITING: 0

## 2021-02-01 NOTE — PROGRESS NOTES
[facility]    Patient: Tamia Melchor, 60 y.o.   : 1955    Pt of Dr. Faye Bowen   Patient presents with    Follow-up     3mo f/u, asthma, no test.         Asthma  She complains of cough, shortness of breath and sputum production. There is no hemoptysis or wheezing. This is a chronic problem. The current episode started more than 1 year ago. The problem occurs daily. Progression since onset: improved since  The cough is productive of sputum (frothy white). Pertinent negatives include no chest pain, dyspnea on exertion, fever or nasal congestion. Her symptoms are aggravated by pollen and change in weather. Her symptoms are alleviated by beta-agonist and rest. She reports significant improvement on treatment. Her past medical history is significant for asthma. Reese Martinez is here for follow up for mild persistent asthma treated for exacerbation due to sinusitis at last appointment with prednisone and doxycycline due to noted penicillin allergy. Today patient reports that her breathing is close to baseline. Reports not needed xopenex for past month. Reports mild physical limitation mostly due to recent surgery Left ankle and tendon repair currently using walking boot. Of note her Mom had Covid and she transported her via her car on Thursday of last week she wore two masks and denies any change in her overall status. Asthma control (Severity) questionnaire:  Asthma symptoms:  > 2 times per week:  Yes  Night time awakenings: > 2 times per month: No  Use of ILAN for symptoms control (Other than pre exercise use):> 2times per week: No  Interference with normal activity: some  Lung function: Fev1 >60% Predicted: Yes    Number of exacerbations per year: > 2: No          Progress History:   Since last visit any new medical issues? Yes had planned surgery at [facility] ER or hospital visits?  No  Any new or changes in medicines? No  Using inhalers? Yes dulera, xopenex  Are they helpful? Yes   Flu vaccine? Reports up to date  Pneumonia vaccine?  Last dose 2015  Past Medical hx   PMH:  Past Medical History:   Diagnosis Date    Asthma     Dr. Omid Osborne Depression       from [facility] neck    Fibromyalgia    [facility] Hyperlipemia     check per Dr Jon Olson Insulin resistance     Narcolepsy     per Dr. Puja Veloz via sleep study    Neck pain     cervical stenosis    PLMD (periodic limb movement disorder)     Prolonged emergence from general anesthesia     Tachycardia      SURGICAL HISTORY:  Past Surgical History:   Procedure Laterality Date    ANKLE SURGERY Left 2017    [facility]    CARPAL TUNNEL RELEASE Right     CERVICAL DISCECTOMY      C3-4, C4-5    CERVICAL FUSION N/A 2019    REMOVAL OF PLATE G3-7, ACDF Q8-5, C6-7 WITH PLATING/MEDTRONIC performed by Grace Mayen MD at [address] [place] [address]  2006   [address] WITH STENT PLACEMENT  2/12/15    Dr. Chalo Benjamin      FOOT SURGERY Right     bone removal from foot on rt    FOOT SURGERY Left     bone spur, tarsal tunnel and cyst removal on left    HYSTERECTOMY  1996    total, endometriosis    JOINT REPLACEMENT  2009    bilateral knee    LAPAROSCOPY      for endometriosis    TONSILLECTOMY AND ADENOIDECTOMY  2006    UPPER GASTROINTESTINAL ENDOSCOPY  2006     SOCIAL HISTORY:  Social History     Tobacco Use    Smoking status: Never Smoker    Smokeless tobacco: Never Used   Substance Use Topics    Alcohol use: Yes     Comment: rarely    Drug use: No     ALLERGIES:  Allergies   Allergen Reactions    Bactrim Other (See Comments)     Metallic taste    Iv Dye [Iodides] Nausea Only and Other (See Comments)     Says older IVP dye causes Stomach pains    Tolectin [Tolmetin Sodium] Other (See Comments)     Metallic taste    Claforan [Cefotaxime Sodium In D5w] Hives and Rash TAKE 2 TABLETS BY MOUTH IN THE AFTERNOON 270 tablet 1    fluticasone (FLONASE) 50 MCG/ACT nasal spray 1 spray by Nasal route daily 3 Bottle 1    rizatriptan (MAXALT) 10 MG tablet Take 1 tablet by mouth once as needed for Migraine May repeat in 2 hours if needed 30 tablet 3    montelukast (SINGULAIR) 10 MG tablet Take 10 mg by mouth nightly      atorvastatin (LIPITOR) 80 MG tablet Take 1 tablet by mouth daily. 30 tablet 3    nitroGLYCERIN (NITROSTAT) 0.4 MG SL tablet Place 1 tablet under the tongue every 5 minutes as needed for Chest pain. 25 tablet 3    losartan (COZAAR) 25 MG tablet Take 1 tablet by mouth daily. (Patient taking differently: Take 25 mg by mouth nightly ) 30 tablet 3    Multiple Vitamins-Minerals (THERAPEUTIC MULTIVITAMIN-MINERALS) tablet Take 1 tablet by mouth daily.  Coenzyme Q10 (CO Q 10) 10 MG CAPS Take 1 capsule by mouth daily as needed       COCONUT OIL PO Take 2 capsules by mouth daily.  Calcium Carbonate-Vitamin D (CALCIUM + D PO) Take 1,200 mg by mouth daily.  LYSINE Take 1,000 mg by mouth 2 times daily as needed        No current facility-administered medications for this visit. Carley TILLEY   Review of Systems   Constitutional: Negative for chills, fever and unexpected weight change. Respiratory: Positive for cough, sputum production and shortness of breath. Negative for hemoptysis, chest tightness, wheezing and stridor. Cardiovascular: Negative for chest pain, dyspnea on exertion and leg swelling. Gastrointestinal: Negative for diarrhea, nausea and vomiting. Genitourinary: Negative for dysuria.         Physical exam   /80 (Site: Right Upper Arm, Position: Sitting, Cuff Size: Medium Adult)   Pulse 74   Temp 97.7 °F (36.5 °C) (Temporal)   Ht 5' 7\" (1.702 m)   Wt 166 lb (75.3 kg) Comment: with boot  SpO2 97% Comment: r/a  BMI 26.00 kg/m²    Wt Readings from Last 3 Encounters:   02/01/21 166 lb (75.3 kg)   11/23/20 169 lb 12.8 oz (77 kg) 10/14/20 171 lb 6.4 oz (77.7 kg)       Physical Exam  Vitals signs and nursing note reviewed. Constitutional:       General: She is not in acute distress. Appearance: She is well-developed. HENT:      Head: Normocephalic and atraumatic. Neck:      Musculoskeletal: Neck supple. Trachea: No tracheal deviation. Cardiovascular:      Rate and Rhythm: Normal rate and regular rhythm. Heart sounds: Normal heart sounds. No murmur. Pulmonary:      Effort: Pulmonary effort is normal. No respiratory distress. Breath sounds: Normal breath sounds. No stridor. No wheezing or rales. Chest:      Chest wall: No tenderness. Abdominal:      General: Bowel sounds are normal. There is no distension. Palpations: Abdomen is soft. Musculoskeletal:      Comments: LLE walking boot in place   Skin:     General: Skin is warm and dry. Capillary Refill: Capillary refill takes less than 2 seconds. Neurological:      Mental Status: She is alert and oriented to person, place, and time. Psychiatric:         Behavior: Behavior normal.         Thought Content: Thought content normal.          Results   Lung Nodule Screening     [] Qualifies    [x] Does not qualify   [] Declined    [] Completed   Non-smoker   The USPSTF recommends annual screening for lung cancer with low-dose computed tomography (LDCT) in adults aged 54 to [de-identified] years who have a 30 pack-year smoking history and currently smoke or have quit within the past 15 years. Screening should be discontinued once a person has not smoked for 15 years or develops a health problem that substantially limits life expectancy or the ability or willingness to have curative lung surgery. Assessment      Diagnosis Orders   1.  Mild persistent asthma without complication  MD NITRIC OXIDE  GAS DETERMINATION    mometasone-formoterol (DULERA) 200-5 MCG/ACT inhaler         Plan   -Feno today shows level of 32 will increase dulera to 200 from 100

## 2021-02-01 NOTE — TELEPHONE ENCOUNTER
Spoke to patient regarding results. Verbalized understanding. Patient was in car with her mother for several hours who then tested positive for covid. Patient is having sinus issues. Testing ordered.

## 2021-02-03 ENCOUNTER — HOSPITAL ENCOUNTER (OUTPATIENT)
Age: 66
Discharge: HOME | End: 2021-02-03
Payer: COMMERCIAL

## 2021-02-03 PROCEDURE — U0003 INFECTIOUS AGENT DETECTION BY NUCLEIC ACID (DNA OR RNA); SEVERE ACUTE RESPIRATORY SYNDROME CORONAVIRUS 2 (SARS-COV-2) (CORONAVIRUS DISEASE [COVID-19]), AMPLIFIED PROBE TECHNIQUE, MAKING USE OF HIGH THROUGHPUT TECHNOLOGIES AS DESCRIBED BY CMS-2020-01-R: HCPCS

## 2021-02-04 LAB — SARS-COV-2: NOT DETECTED

## 2021-03-15 ENCOUNTER — OFFICE VISIT (OUTPATIENT)
Dept: FAMILY MEDICINE CLINIC | Age: 66
End: 2021-03-15
Payer: COMMERCIAL

## 2021-03-15 VITALS
SYSTOLIC BLOOD PRESSURE: 133 MMHG | HEART RATE: 72 BPM | DIASTOLIC BLOOD PRESSURE: 84 MMHG | RESPIRATION RATE: 12 BRPM | TEMPERATURE: 98 F | HEIGHT: 68 IN | WEIGHT: 171 LBS | BODY MASS INDEX: 25.91 KG/M2

## 2021-03-15 DIAGNOSIS — Z13.1 SCREENING FOR DIABETES MELLITUS: ICD-10-CM

## 2021-03-15 DIAGNOSIS — E55.9 VITAMIN D DEFICIENCY: ICD-10-CM

## 2021-03-15 DIAGNOSIS — I10 ESSENTIAL HYPERTENSION: ICD-10-CM

## 2021-03-15 DIAGNOSIS — E88.81 METABOLIC SYNDROME: ICD-10-CM

## 2021-03-15 DIAGNOSIS — M79.7 FIBROMYALGIA: ICD-10-CM

## 2021-03-15 DIAGNOSIS — E78.00 HYPERCHOLESTEROLEMIA: Primary | ICD-10-CM

## 2021-03-15 DIAGNOSIS — E04.1 COLLOID THYROID NODULE: ICD-10-CM

## 2021-03-15 DIAGNOSIS — F33.1 MODERATE RECURRENT MAJOR DEPRESSION (HCC): ICD-10-CM

## 2021-03-15 LAB — HBA1C MFR BLD: 5.6 %

## 2021-03-15 PROCEDURE — 99214 OFFICE O/P EST MOD 30 MIN: CPT | Performed by: FAMILY MEDICINE

## 2021-03-15 PROCEDURE — 83036 HEMOGLOBIN GLYCOSYLATED A1C: CPT | Performed by: FAMILY MEDICINE

## 2021-03-15 RX ORDER — ARIPIPRAZOLE 5 MG/1
5 TABLET ORAL DAILY
Qty: 90 TABLET | Refills: 1 | Status: SHIPPED | OUTPATIENT
Start: 2021-03-15 | End: 2021-03-22

## 2021-03-15 RX ORDER — OMEPRAZOLE 40 MG/1
40 CAPSULE, DELAYED RELEASE ORAL
Qty: 90 CAPSULE | Refills: 1 | Status: SHIPPED | OUTPATIENT
Start: 2021-03-15 | End: 2021-09-02

## 2021-03-15 RX ORDER — ATORVASTATIN CALCIUM 80 MG/1
80 TABLET, FILM COATED ORAL DAILY
Qty: 90 TABLET | Refills: 1 | Status: SHIPPED | OUTPATIENT
Start: 2021-03-15 | End: 2021-09-20 | Stop reason: SDUPTHER

## 2021-03-15 RX ORDER — TOPIRAMATE 50 MG/1
TABLET, FILM COATED ORAL
Qty: 270 TABLET | Refills: 1 | Status: SHIPPED | OUTPATIENT
Start: 2021-03-15 | End: 2021-09-02

## 2021-03-15 RX ORDER — ATENOLOL 50 MG/1
TABLET ORAL
Qty: 135 TABLET | Refills: 1 | Status: SHIPPED | OUTPATIENT
Start: 2021-03-15 | End: 2021-03-22

## 2021-03-15 RX ORDER — FLUTICASONE PROPIONATE 50 MCG
1 SPRAY, SUSPENSION (ML) NASAL DAILY
Qty: 3 BOTTLE | Refills: 1 | Status: SHIPPED | OUTPATIENT
Start: 2021-03-15

## 2021-03-15 RX ORDER — VENLAFAXINE HYDROCHLORIDE 150 MG/1
CAPSULE, EXTENDED RELEASE ORAL
Qty: 180 CAPSULE | Refills: 1 | Status: SHIPPED | OUTPATIENT
Start: 2021-03-15 | End: 2021-04-13

## 2021-03-15 RX ORDER — METFORMIN HYDROCHLORIDE 500 MG/1
2000 TABLET, EXTENDED RELEASE ORAL EVERY EVENING
Qty: 360 TABLET | Refills: 1 | Status: SHIPPED | OUTPATIENT
Start: 2021-03-15 | End: 2021-03-22

## 2021-03-15 RX ORDER — RIZATRIPTAN BENZOATE 10 MG/1
10 TABLET ORAL
Qty: 36 TABLET | Refills: 1 | Status: SHIPPED | OUTPATIENT
Start: 2021-03-15 | End: 2021-09-20 | Stop reason: SDUPTHER

## 2021-03-15 SDOH — ECONOMIC STABILITY: TRANSPORTATION INSECURITY
IN THE PAST 12 MONTHS, HAS THE LACK OF TRANSPORTATION KEPT YOU FROM MEDICAL APPOINTMENTS OR FROM GETTING MEDICATIONS?: NOT ASKED

## 2021-03-15 SDOH — ECONOMIC STABILITY: FOOD INSECURITY: WITHIN THE PAST 12 MONTHS, THE FOOD YOU BOUGHT JUST DIDN'T LAST AND YOU DIDN'T HAVE MONEY TO GET MORE.: NEVER TRUE

## 2021-03-15 SDOH — ECONOMIC STABILITY: INCOME INSECURITY: HOW HARD IS IT FOR YOU TO PAY FOR THE VERY BASICS LIKE FOOD, HOUSING, MEDICAL CARE, AND HEATING?: NOT VERY HARD

## 2021-03-15 SDOH — ECONOMIC STABILITY: FOOD INSECURITY: WITHIN THE PAST 12 MONTHS, YOU WORRIED THAT YOUR FOOD WOULD RUN OUT BEFORE YOU GOT MONEY TO BUY MORE.: NEVER TRUE

## 2021-03-15 ASSESSMENT — PATIENT HEALTH QUESTIONNAIRE - PHQ9
1. LITTLE INTEREST OR PLEASURE IN DOING THINGS: 0
SUM OF ALL RESPONSES TO PHQ QUESTIONS 1-9: 0

## 2021-03-15 NOTE — PROGRESS NOTES
[address] [place]  [address] [place], [address]  Ph:   [phone]  Fax: [phone]    Provider:  Dr. Randi Leavitt    Patient:  Nahun Saunders  YOB: 1955      Visit Date:  3/15/2021     Reason For Visit:   Chief Complaint   Patient presents with    Follow-up    Hypertension    Depression    Hyperlipidemia        Jo Pimentel is a 60 y.o. female who comes today to the office for follow up hypercholesterolemia, HTN, metabolic syndrome, FMG, vitamin D deficiency. She states she has been doing well, taking her medications as prescribed. She denies any side effects from her medications. Hyperlipidemia:   She is taking Lipitor 80 mg 1 tablet PO daily.  She has low HDL and elevated LDL.  She denies side efects from the medicines. She tries to follow a low cholesterol diet.   Last blood test on 2020 show a total cholesterol 155, triglycerides 203 HDL 43 and LDL 71. Paternal grandfather  in his 29's from massive heart attack and her uncle had a MI at 61. She does not exercise regularly, but she is very active.      HTN/CAD:  She is taking Cozaar 25 mg 1 tablet PO daily, Tenormin 50 mg 1 1/2 tablet PO daily.  She tries to follow low cholesterol diet.  She had a stent placed in 2015.  She has been stable and asymptomatic.     Metabolic Syndrome: Hermilo Kemp is taking Glucophage  mg 4 PO daily and denies any side effects from the medicine. Last HbA1C done 2019 was 6.0%. Today her hemoglobin A1c was 5.6%.     FMG: She is taking Effexor  mg 2 PO daily, Zanaflex and Ultram for it off and on.  She takes the Ultram, but not very often. She takes about 1-2 per week.       Vitamin D deficiency:   She is taking 6000 international units of vitamin D3 over-the-counter. Last level done 2020 was 45. Bone density performed 2020 showed that she have osteopenia placing her at medium risk for fracture.      Benign follicular nodule:  Ultrasound on 2020 show solid heterogeneous vascular right thyroid lobe nodule which was low to intermediate suspicious but the size meet criteria for ultrasound-guided fine-needle aspiration which was done  and showed benign appearing follicular cells, colloid and cyst contents consistent with a benign follicular nodule.     Depression: She is taking Abilify 5 mg every day and Effexor  mg 1 tablet PO BID.  She denies any side effects from it.  She feels that she is stable       Significant Past Medical History:    Past Medical History:   Diagnosis Date    Asthma     Dr. Deonte Ross Depression       from [facility] neck    Fibromyalgia    [facility] Hyperlipemia     check per Dr Lino Cao     Insulin resistance     Narcolepsy 2000    per Dr. Lino Cao via sleep study    Neck pain     cervical stenosis    PLMD (periodic limb movement disorder)     Prolonged emergence from general anesthesia     Tachycardia            Allergies:  is allergic to bactrim; iv dye [iodides]; tolectin [tolmetin sodium]; claforan [cefotaxime sodium in d5w]; and pcn [penicillins].     Medications:   Current Outpatient Medications   Medication Sig Dispense Refill    rizatriptan (MAXALT) 10 MG tablet Take 1 tablet by mouth once as needed for Migraine May repeat in 2 hours if needed 36 tablet 1    venlafaxine (EFFEXOR XR) 150 MG extended release capsule TAKE 1 CAPSULE BY MOUTH 2 TIMES A  capsule 1    ARIPiprazole (ABILIFY) 5 MG tablet Take 1 tablet by mouth daily 90 tablet 1    atenolol (TENORMIN) 50 MG tablet TAKE 1 AND 1/2 TABLETS BY MOUTH DAILY 135 tablet 1    metFORMIN (GLUCOPHAGE-XR) 500 MG extended release tablet Take 4 tablets by mouth every evening 360 tablet 1    topiramate (TOPAMAX) 50 MG tablet TAKE 1 TABLET BY MOUTH EVERY MORNING AND TAKE 2 TABLETS BY MOUTH IN THE AFTERNOON 270 tablet 1    omeprazole (PRILOSEC) 40 MG delayed release capsule Take 1 capsule by mouth every morning (before breakfast) 90 capsule 1    fluticasone (FLONASE) 50 MCG/ACT nasal spray 1 spray by Nasal route daily 3 Bottle 1    atorvastatin (LIPITOR) 80 MG tablet Take 1 tablet by mouth daily 90 tablet 1    mometasone-formoterol (DULERA) 200-5 MCG/ACT inhaler Inhale 2 puffs into the lungs every 12 hours Rinse mouth after its use. 3 Inhaler 3    tiZANidine (ZANAFLEX) 4 MG tablet TAKE 1 TABLET BY MOUTH EVERY NIGHT AT BEDTIME AS NEEDED 90 tablet 1    levalbuterol (XOPENEX HFA) 45 MCG/ACT inhaler Inhale 2 puffs into the lungs every 6 hours as needed for Wheezing or Shortness of Breath 1 Inhaler 11    montelukast (SINGULAIR) 10 MG tablet Take 10 mg by mouth nightly      nitroGLYCERIN (NITROSTAT) 0.4 MG SL tablet Place 1 tablet under the tongue every 5 minutes as needed for Chest pain. 25 tablet 3    losartan (COZAAR) 25 MG tablet Take 1 tablet by mouth daily. (Patient taking differently: Take 25 mg by mouth nightly ) 30 tablet 3    Multiple Vitamins-Minerals (THERAPEUTIC MULTIVITAMIN-MINERALS) tablet Take 1 tablet by mouth daily.  Coenzyme Q10 (CO Q 10) 10 MG CAPS Take 1 capsule by mouth daily as needed       Calcium Carbonate-Vitamin D (CALCIUM + D PO) Take 1,200 mg by mouth daily.  LYSINE Take 1,000 mg by mouth 2 times daily as needed        No current facility-administered medications for this visit.         Review of systems:  Review of Systems - History obtained from chart review and the patient  General ROS: negative for - chills, fatigue or fever  Psychological ROS: negative for - anxiety, depression or sleep disturbances  ENT ROS: negative for - headaches, nasal congestion or nasal discharge  Allergy and Immunology ROS: negative for - seasonal allergies  Hematological and Lymphatic ROS: negative for - bruising  Endocrine ROS: negative for - malaise/lethargy, polydipsia/polyuria or temperature intolerance  Respiratory ROS: negative for - cough,  shortness of breath or wheezing  Cardiovascular ROS: negative for - chest pain or edema  Gastrointestinal ROS: negative for - abdominal pain, or nausea/vomiting  Musculoskeletal ROS: negative for - joint pain or muscle pain  Neurological ROS: negative for - dizziness  Dermatological ROS: negative for - rash    Physical Examination:  /84 (Site: Left Upper Arm, Position: Sitting, Cuff Size: Small Adult)   Pulse 72   Temp 98 °F (36.7 °C) (Infrared)   Resp 12   Ht 5' 7.5\" (1.715 m)   Wt 171 lb (77.6 kg)   BMI 26.39 kg/m²     General-  Alert and oriented x 3, NAD  HEENT: NC, AT, PERRLA, EOMI, anicteric sclerae  Ears: Normal tympanic membranes bilaterally  Nose: patent, no lesions  Mouth: no lesions, moist mucosas  Neck - supple, no significant adenopathy  Chest - clear to auscultation, no wheezes, rales or rhonchi, symmetric air entry  Heart - normal rate, regular rhythm, normal S1, S2, no murmurs, rubs, clicks or gallops  Abdomen - soft, nontender, nondistended, no masses or organomegaly  Extremities - peripheral pulses normal, no pedal edema, no clubbing or cyanosis    Impression:   Diagnosis Orders   1. Hypercholesterolemia  Lipid Panel    TSH without Reflex   2. Essential hypertension  atenolol (TENORMIN) 50 MG tablet    CBC Auto Differential    Comprehensive Metabolic Panel    Urinalysis with Microscopic    Vitamin B12    Microalbumin / Creatinine Urine Ratio   3. Metabolic syndrome  atenolol (TENORMIN) 50 MG tablet    metFORMIN (GLUCOPHAGE-XR) 500 MG extended release tablet   4. Fibromyalgia     5. Moderate recurrent major depression ([address]     6. Vitamin D deficiency     7.  Colloid thyroid nodule         Plan:    Orders Placed This Encounter   Medications    rizatriptan (MAXALT) 10 MG tablet     Sig: Take 1 tablet by mouth once as needed for Migraine May repeat in 2 hours if needed     Dispense:  36 tablet     Refill:  1    venlafaxine (EFFEXOR XR) 150 MG extended release capsule     Sig: TAKE 1 CAPSULE BY MOUTH 2 TIMES A DAY     Dispense:  180 capsule     Refill:  1    ARIPiprazole (ABILIFY) 5 MG tablet     Sig: Take 1 tablet by mouth daily     Dispense:  90 tablet     Refill:  1    atenolol (TENORMIN) 50 MG tablet     Sig: TAKE 1 AND 1/2 TABLETS BY MOUTH DAILY     Dispense:  135 tablet     Refill:  1    metFORMIN (GLUCOPHAGE-XR) 500 MG extended release tablet     Sig: Take 4 tablets by mouth every evening     Dispense:  360 tablet     Refill:  1    topiramate (TOPAMAX) 50 MG tablet     Sig: TAKE 1 TABLET BY MOUTH EVERY MORNING AND TAKE 2 TABLETS BY MOUTH IN THE AFTERNOON     Dispense:  270 tablet     Refill:  1    omeprazole (PRILOSEC) 40 MG delayed release capsule     Sig: Take 1 capsule by mouth every morning (before breakfast)     Dispense:  90 capsule     Refill:  1    fluticasone (FLONASE) 50 MCG/ACT nasal spray     Si spray by Nasal route daily     Dispense:  3 Bottle     Refill:  1    atorvastatin (LIPITOR) 80 MG tablet     Sig: Take 1 tablet by mouth daily     Dispense:  90 tablet     Refill:  1       Follow Up:  Return in about 6 months (around 9/15/2021).     Joni Patel MD

## 2021-03-18 ENCOUNTER — IMMUNIZATION (OUTPATIENT)
Dept: PRIMARY CARE CLINIC | Age: 66
End: 2021-03-18
Payer: COMMERCIAL

## 2021-03-18 DIAGNOSIS — I10 ESSENTIAL HYPERTENSION: ICD-10-CM

## 2021-03-18 DIAGNOSIS — E88.81 METABOLIC SYNDROME: ICD-10-CM

## 2021-03-18 PROCEDURE — 91300 COVID-19, PFIZER VACCINE 30MCG/0.3ML DOSE: CPT | Performed by: FAMILY MEDICINE

## 2021-03-18 PROCEDURE — 0001A COVID-19, PFIZER VACCINE 30MCG/0.3ML DOSE: CPT | Performed by: FAMILY MEDICINE

## 2021-03-22 RX ORDER — METFORMIN HYDROCHLORIDE 500 MG/1
TABLET, EXTENDED RELEASE ORAL
Qty: 360 TABLET | Refills: 1 | Status: SHIPPED | OUTPATIENT
Start: 2021-03-22 | End: 2021-09-02

## 2021-03-22 RX ORDER — ARIPIPRAZOLE 5 MG/1
TABLET ORAL
Qty: 90 TABLET | Refills: 1 | Status: SHIPPED | OUTPATIENT
Start: 2021-03-22 | End: 2021-09-02

## 2021-03-22 RX ORDER — ATENOLOL 50 MG/1
TABLET ORAL
Qty: 135 TABLET | Refills: 1 | Status: SHIPPED | OUTPATIENT
Start: 2021-03-22 | End: 2021-09-02

## 2021-04-08 ENCOUNTER — IMMUNIZATION (OUTPATIENT)
Dept: PRIMARY CARE CLINIC | Age: 66
End: 2021-04-08
Payer: COMMERCIAL

## 2021-04-08 PROCEDURE — 91300 COVID-19, PFIZER VACCINE 30MCG/0.3ML DOSE: CPT | Performed by: FAMILY MEDICINE

## 2021-04-08 PROCEDURE — 0002A COVID-19, PFIZER VACCINE 30MCG/0.3ML DOSE: CPT | Performed by: FAMILY MEDICINE

## 2021-04-13 RX ORDER — VENLAFAXINE HYDROCHLORIDE 150 MG/1
CAPSULE, EXTENDED RELEASE ORAL
Qty: 180 CAPSULE | Refills: 1 | Status: SHIPPED | OUTPATIENT
Start: 2021-04-13 | End: 2021-09-20 | Stop reason: SDUPTHER

## 2021-06-01 DIAGNOSIS — J45.30 MILD PERSISTENT ASTHMA WITHOUT COMPLICATION: Primary | ICD-10-CM

## 2021-06-01 RX ORDER — MONTELUKAST SODIUM 10 MG/1
10 TABLET ORAL NIGHTLY
Qty: 30 TABLET | Refills: 11 | Status: SHIPPED | OUTPATIENT
Start: 2021-06-01 | End: 2022-07-22

## 2021-06-09 DIAGNOSIS — G47.419 PRIMARY NARCOLEPSY WITHOUT CATAPLEXY: ICD-10-CM

## 2021-06-09 RX ORDER — ARMODAFINIL 200 MG/1
TABLET ORAL
Qty: 90 TABLET | Refills: 2 | Status: SHIPPED | OUTPATIENT
Start: 2021-06-09 | End: 2021-12-06

## 2021-07-19 DIAGNOSIS — M79.7 FIBROMYALGIA: ICD-10-CM

## 2021-07-19 RX ORDER — TIZANIDINE 4 MG/1
TABLET ORAL
Qty: 90 TABLET | Refills: 1 | Status: SHIPPED | OUTPATIENT
Start: 2021-07-19

## 2021-07-19 NOTE — TELEPHONE ENCOUNTER
Please approve or deny     Last Visit Date:  3/2/2020       Next Visit Date:    Visit date not found

## 2021-07-21 LAB
CHOLESTEROL, TOTAL: 187 MG/DL (ref 0–199)
FASTING: YES
GLUCOSE BLD-MCNC: 93 MG/DL (ref 74–109)
HDLC SERPL-MCNC: 44 MG/DL (ref 40–90)
LDL CHOLESTEROL CALCULATED: 85 MG/DL
TRIGL SERPL-MCNC: 292 MG/DL (ref 0–199)

## 2021-09-01 ENCOUNTER — OFFICE VISIT (OUTPATIENT)
Dept: FAMILY MEDICINE CLINIC | Age: 66
End: 2021-09-01
Payer: MEDICARE

## 2021-09-01 ENCOUNTER — HOSPITAL ENCOUNTER (OUTPATIENT)
Age: 66
Discharge: HOME | End: 2021-09-01
Payer: COMMERCIAL

## 2021-09-01 VITALS
OXYGEN SATURATION: 98 % | BODY MASS INDEX: 26.89 KG/M2 | RESPIRATION RATE: 12 BRPM | HEIGHT: 68 IN | WEIGHT: 177.4 LBS | DIASTOLIC BLOOD PRESSURE: 85 MMHG | TEMPERATURE: 97.9 F | SYSTOLIC BLOOD PRESSURE: 124 MMHG | HEART RATE: 77 BPM

## 2021-09-01 DIAGNOSIS — J45.21 MILD INTERMITTENT ASTHMA WITH EXACERBATION: Primary | ICD-10-CM

## 2021-09-01 DIAGNOSIS — J20.9 BRONCHITIS WITH ASTHMA, ACUTE: ICD-10-CM

## 2021-09-01 DIAGNOSIS — J45.909 BRONCHITIS WITH ASTHMA, ACUTE: ICD-10-CM

## 2021-09-01 LAB
INFLUENZA A: NOT DETECTED
INFLUENZA B: NOT DETECTED
SARS-COV-2 RNA, RT PCR: NOT DETECTED

## 2021-09-01 PROCEDURE — 87636 SARSCOV2 & INF A&B AMP PRB: CPT

## 2021-09-01 PROCEDURE — 96372 THER/PROPH/DIAG INJ SC/IM: CPT | Performed by: FAMILY MEDICINE

## 2021-09-01 PROCEDURE — 99213 OFFICE O/P EST LOW 20 MIN: CPT | Performed by: FAMILY MEDICINE

## 2021-09-01 RX ORDER — GUAIFENESIN 600 MG/1
1200 TABLET, EXTENDED RELEASE ORAL 2 TIMES DAILY
Qty: 40 TABLET | Refills: 0 | Status: SHIPPED | OUTPATIENT
Start: 2021-09-01 | End: 2021-09-11

## 2021-09-01 RX ORDER — AZITHROMYCIN 250 MG/1
250 TABLET, FILM COATED ORAL DAILY
Qty: 10 TABLET | Refills: 0 | Status: SHIPPED | OUTPATIENT
Start: 2021-09-01 | End: 2021-09-11

## 2021-09-01 RX ORDER — BENZONATATE 200 MG/1
200 CAPSULE ORAL 3 TIMES DAILY PRN
Qty: 30 CAPSULE | Refills: 0 | Status: SHIPPED | OUTPATIENT
Start: 2021-09-01 | End: 2021-09-08

## 2021-09-01 RX ORDER — METHYLPREDNISOLONE SODIUM SUCCINATE 125 MG/2ML
125 INJECTION, POWDER, LYOPHILIZED, FOR SOLUTION INTRAMUSCULAR; INTRAVENOUS ONCE
Status: COMPLETED | OUTPATIENT
Start: 2021-09-01 | End: 2021-09-01

## 2021-09-01 RX ADMIN — METHYLPREDNISOLONE SODIUM SUCCINATE 125 MG: 125 INJECTION, POWDER, LYOPHILIZED, FOR SOLUTION INTRAMUSCULAR; INTRAVENOUS at 16:13

## 2021-09-01 NOTE — PROGRESS NOTES
Administrations This Visit     methylPREDNISolone sodium (SOLU-MEDROL) injection 125 mg     Admin Date  09/01/2021  16:13 Action  Given Dose  125 mg Route  IntraVENous Site  Dorsogluteal Right Administered By  Bhumika Goss LPN    Ordering Provider: Benjy Jean-Baptiste MD    NDC: 9105-9226-04    Lot#: [id number]    : Julio Brunson.     Patient Supplied?: No            PATIENT TOLERATED WELL

## 2021-09-01 NOTE — PROGRESS NOTES
[address] [place]  [address], [address] [place]  Ph:   [phone]  Fax: [phone]    Provider:  Dr. Ghanshyam Barbosa Note    Patient Name:  Natan Chauhan   : 1955   Age: 73 y.o. Date of Exam:  2021       Reason For Visit:   Chief Complaint   Patient presents with    Cough     x 1wks - productive cough yellowish in color    Congestion    Headache        Aleshia Chandler is a 73 y.o. female who comes today to the office because of sore throat, congestion, runny nose, malaise, muscle aching and cough. Symptoms started 7 day(s) ago. The symptoms have gotten unchanged. She has tried medications over the counter without improvement. She has not been exposed to somebody with similar symptoms. Significant Past Medical History:    Past Medical History:   Diagnosis Date    Asthma     Dr. Kim Workman Depression       from [facility] neck    Fibromyalgia    [address] [place] [address] Hyperlipemia     check per Dr Daron Wolff Insulin resistance 2011    Narcolepsy 2000    per Dr. Meek Castellon via sleep study    Neck pain     cervical stenosis    PLMD (periodic limb movement disorder)     Prolonged emergence from general anesthesia     Tachycardia            Allergies:  is allergic to bactrim, iv dye [iodides], tolectin [tolmetin sodium], claforan [cefotaxime sodium in d5w], and pcn [penicillins].     Medications:   Current Outpatient Medications   Medication Sig Dispense Refill    guaiFENesin (MUCINEX) 600 MG extended release tablet Take 2 tablets by mouth 2 times daily for 10 days 40 tablet 0    benzonatate (TESSALON) 200 MG capsule Take 1 capsule by mouth 3 times daily as needed for Cough 30 capsule 0    azithromycin (ZITHROMAX Z-ESTER) 250 MG tablet Take 1 tablet by mouth daily for 10 days 10 tablet 0    tiZANidine (ZANAFLEX) 4 MG tablet TAKE 1 TABLET BY MOUTH NIGHTLY AT BEDTIME AS NEEDED 90 tablet 1    Armodafinil 200 MG TABS TAKE 1 TABLET BY MOUTH ONCE DAILY. 90 tablet 2    montelukast (SINGULAIR) 10 MG tablet Take 1 tablet by mouth nightly 30 tablet 11    venlafaxine (EFFEXOR XR) 150 MG extended release capsule TAKE 1 CAPSULE BY MOUTH TWICE DAILY. 180 capsule 1    rizatriptan (MAXALT) 10 MG tablet Take 1 tablet by mouth once as needed for Migraine May repeat in 2 hours if needed 36 tablet 1    fluticasone (FLONASE) 50 MCG/ACT nasal spray 1 spray by Nasal route daily 3 Bottle 1    atorvastatin (LIPITOR) 80 MG tablet Take 1 tablet by mouth daily 90 tablet 1    mometasone-formoterol (DULERA) 200-5 MCG/ACT inhaler Inhale 2 puffs into the lungs every 12 hours Rinse mouth after its use. 3 Inhaler 3    levalbuterol (XOPENEX HFA) 45 MCG/ACT inhaler Inhale 2 puffs into the lungs every 6 hours as needed for Wheezing or Shortness of Breath 1 Inhaler 11    nitroGLYCERIN (NITROSTAT) 0.4 MG SL tablet Place 1 tablet under the tongue every 5 minutes as needed for Chest pain. 25 tablet 3    losartan (COZAAR) 25 MG tablet Take 1 tablet by mouth daily. (Patient taking differently: Take 25 mg by mouth nightly ) 30 tablet 3    Multiple Vitamins-Minerals (THERAPEUTIC MULTIVITAMIN-MINERALS) tablet Take 1 tablet by mouth daily.  Coenzyme Q10 (CO Q 10) 10 MG CAPS Take 1 capsule by mouth daily as needed       Calcium Carbonate-Vitamin D (CALCIUM + D PO) Take 1,200 mg by mouth daily.         LYSINE Take 1,000 mg by mouth 2 times daily as needed       ARIPiprazole (ABILIFY) 5 MG tablet TAKE 1 TABLET BY MOUTH ONCE DAILY 90 tablet 1    atenolol (TENORMIN) 50 MG tablet TAKE 1 AND 1/2 TABLETS BY MOUTH ONCE DAILY 135 tablet 1    omeprazole (PRILOSEC) 40 MG delayed release capsule TAKE 1 CAPSULE BY MOUTH EVERY MORNING BEFORE BREAKFAST 90 capsule 1    topiramate (TOPAMAX) 50 MG tablet TAKE 1 TABLET BY MOUTH EVERY MORNING AND TAKE 2 TABLETS BY MOUTH IN THE AFTERNOON 270 tablet 1    metFORMIN (GLUCOPHAGE-XR) 500 MG extended release tablet TAKE 4 TABLETS BY MOUTH EVERY EVENING. 360 tablet 1     No current facility-administered medications for this visit. Review of systems:  Review of Systems - History obtained from chart review and the patient  General ROS: negative for - chills, fatigue or fever  ENT ROS: negative for - headaches. Positive for nasal congestion   Respiratory ROS: positive for - cough,  heaviness in chest.  Mild shortness of breath and wheezing  Cardiovascular ROS: negative for - chest pain or edema  Gastrointestinal ROS: negative for - abdominal pain. No new diarrhea or nausea. Vomiting x 1 with cough  Musculoskeletal ROS: negative for - joint pain or muscle pain  Neurological ROS: negative for - dizziness  Dermatological ROS: negative for - rash    Physical Examination:  Blood pressure 124/85, pulse 77, temperature 97.9 °F (36.6 °C), temperature source Temporal, resp. rate 12, height 5' 8\" (1.727 m), weight 177 lb 6.4 oz (80.5 kg), SpO2 98 %. General-  Alert and oriented x 3, NAD  HEENT: NC, AT, PERRLA, EOMI, anicteric sclerae  Mouth: no lesions, moist mucosas  Neck - supple, no significant adenopathy  Chest - prominent rhonchi  Heart - normal rate, regular rhythm, normal S1, S2, no murmurs, rubs, clicks or gallops  Abdomen - soft, nontender, nondistended, no masses or organomegaly  Extremities - peripheral pulses normal, no pedal edema, no clubbing or cyanosis    Impression:   Diagnosis Orders   1. Mild intermittent asthma with exacerbation  azithromycin (ZITHROMAX Z-ESTER) 250 MG tablet    COVID-19   2. Bronchitis with asthma, acute  azithromycin (ZITHROMAX Z-ESTER) 250 MG tablet    COVID-19       Plan:  Orders Placed This Encounter   Procedures    COVID-19     Standing Status:   Future     Standing Expiration Date:   9/1/2022     Scheduling Instructions:      1) Due to current limited availability of the COVID-19 test, tests will be prioritized based on responses to questions above. Testing may be delayed due to volume.             2) Print and instruct patient to adhere to CDC home isolation program. (Link Above)              3) Set up or refer patient for a monitoring program.              4) Have patient sign up for and leverage MyChart (if not previously done). Order Specific Question:   Is this test for diagnosis or screening? Answer:   Diagnosis of ill patient     Order Specific Question:   Symptomatic for COVID-19 as defined by CDC? Answer:   Yes     Order Specific Question:   Date of Symptom Onset     Answer:   8/25/2021     Order Specific Question:   Hospitalized for COVID-19? Answer:   No     Order Specific Question:   Admitted to ICU for COVID-19? Answer:   No     Order Specific Question:   Employed in healthcare setting? Answer:   Yes     Order Specific Question:   Resident in a congregate (group) care setting? Answer:   No     Order Specific Question:   Pregnant? Answer:   No     Order Specific Question:   Previously tested for COVID-19? Answer:   Yes     Orders Placed This Encounter   Medications    guaiFENesin (MUCINEX) 600 MG extended release tablet     Sig: Take 2 tablets by mouth 2 times daily for 10 days     Dispense:  40 tablet     Refill:  0    benzonatate (TESSALON) 200 MG capsule     Sig: Take 1 capsule by mouth 3 times daily as needed for Cough     Dispense:  30 capsule     Refill:  0    azithromycin (ZITHROMAX Z-ESTER) 250 MG tablet     Sig: Take 1 tablet by mouth daily for 10 days     Dispense:  10 tablet     Refill:  0    methylPREDNISolone sodium (SOLU-MEDROL) injection 125 mg       Follow Up:  Return if symptoms worsen or fail to improve.     Halima Montiel MD

## 2021-09-02 DIAGNOSIS — I10 ESSENTIAL HYPERTENSION: ICD-10-CM

## 2021-09-02 DIAGNOSIS — E88.81 METABOLIC SYNDROME: ICD-10-CM

## 2021-09-02 RX ORDER — ARIPIPRAZOLE 5 MG/1
TABLET ORAL
Qty: 90 TABLET | Refills: 1 | Status: SHIPPED | OUTPATIENT
Start: 2021-09-02

## 2021-09-02 RX ORDER — TOPIRAMATE 50 MG/1
TABLET, FILM COATED ORAL
Qty: 270 TABLET | Refills: 1 | Status: SHIPPED | OUTPATIENT
Start: 2021-09-02 | End: 2022-03-16 | Stop reason: SDUPTHER

## 2021-09-02 RX ORDER — OMEPRAZOLE 40 MG/1
CAPSULE, DELAYED RELEASE ORAL
Qty: 90 CAPSULE | Refills: 1 | Status: SHIPPED | OUTPATIENT
Start: 2021-09-02 | End: 2022-03-16

## 2021-09-02 RX ORDER — METFORMIN HYDROCHLORIDE 500 MG/1
TABLET, EXTENDED RELEASE ORAL
Qty: 360 TABLET | Refills: 1 | Status: SHIPPED | OUTPATIENT
Start: 2021-09-02 | End: 2022-03-16 | Stop reason: SDUPTHER

## 2021-09-02 RX ORDER — ATENOLOL 50 MG/1
TABLET ORAL
Qty: 135 TABLET | Refills: 1 | Status: SHIPPED | OUTPATIENT
Start: 2021-09-02 | End: 2022-03-22

## 2021-09-07 ENCOUNTER — TELEPHONE (OUTPATIENT)
Dept: PULMONOLOGY | Age: 66
End: 2021-09-07

## 2021-09-07 DIAGNOSIS — I10 ESSENTIAL HYPERTENSION: ICD-10-CM

## 2021-09-07 DIAGNOSIS — E88.81 METABOLIC SYNDROME: ICD-10-CM

## 2021-09-07 DIAGNOSIS — J45.30 MILD PERSISTENT ASTHMA WITHOUT COMPLICATION: Primary | ICD-10-CM

## 2021-09-07 RX ORDER — BUDESONIDE AND FORMOTEROL FUMARATE DIHYDRATE 160; 4.5 UG/1; UG/1
2 AEROSOL RESPIRATORY (INHALATION) 2 TIMES DAILY
Qty: 3 EACH | Refills: 3 | Status: SHIPPED | OUTPATIENT
Start: 2021-09-07 | End: 2021-10-19 | Stop reason: ALTCHOICE

## 2021-09-07 RX ORDER — ARIPIPRAZOLE 5 MG/1
TABLET ORAL
Qty: 90 TABLET | Refills: 1 | OUTPATIENT
Start: 2021-09-07

## 2021-09-07 RX ORDER — OMEPRAZOLE 40 MG/1
CAPSULE, DELAYED RELEASE ORAL
Qty: 90 CAPSULE | Refills: 1 | OUTPATIENT
Start: 2021-09-07

## 2021-09-07 RX ORDER — METFORMIN HYDROCHLORIDE 500 MG/1
TABLET, EXTENDED RELEASE ORAL
Qty: 360 TABLET | Refills: 1 | OUTPATIENT
Start: 2021-09-07

## 2021-09-07 RX ORDER — ATENOLOL 50 MG/1
TABLET ORAL
Qty: 135 TABLET | Refills: 1 | OUTPATIENT
Start: 2021-09-07

## 2021-09-07 RX ORDER — TOPIRAMATE 50 MG/1
TABLET, FILM COATED ORAL
Qty: 270 TABLET | Refills: 1 | OUTPATIENT
Start: 2021-09-07

## 2021-09-07 NOTE — TELEPHONE ENCOUNTER
Order placed for Symbicort 160 advise patient of change and reason if patient has worsening of symptoms after using for 2 weeks instruct her to notify office

## 2021-09-20 ENCOUNTER — OFFICE VISIT (OUTPATIENT)
Dept: PULMONOLOGY | Age: 66
End: 2021-09-20
Payer: MEDICARE

## 2021-09-20 ENCOUNTER — OFFICE VISIT (OUTPATIENT)
Dept: FAMILY MEDICINE CLINIC | Age: 66
End: 2021-09-20
Payer: MEDICARE

## 2021-09-20 VITALS
HEIGHT: 68 IN | DIASTOLIC BLOOD PRESSURE: 64 MMHG | HEART RATE: 100 BPM | SYSTOLIC BLOOD PRESSURE: 134 MMHG | TEMPERATURE: 97.8 F | WEIGHT: 178.2 LBS | BODY MASS INDEX: 27.01 KG/M2 | OXYGEN SATURATION: 98 %

## 2021-09-20 VITALS
DIASTOLIC BLOOD PRESSURE: 77 MMHG | HEIGHT: 68 IN | BODY MASS INDEX: 26.89 KG/M2 | SYSTOLIC BLOOD PRESSURE: 126 MMHG | WEIGHT: 177.4 LBS | HEART RATE: 102 BPM

## 2021-09-20 DIAGNOSIS — E78.00 HYPERCHOLESTEROLEMIA: ICD-10-CM

## 2021-09-20 DIAGNOSIS — E55.9 VITAMIN D DEFICIENCY: ICD-10-CM

## 2021-09-20 DIAGNOSIS — F33.1 MODERATE RECURRENT MAJOR DEPRESSION (HCC): ICD-10-CM

## 2021-09-20 DIAGNOSIS — Z23 INFLUENZA VACCINE ADMINISTERED: ICD-10-CM

## 2021-09-20 DIAGNOSIS — E04.1 COLLOID THYROID NODULE: ICD-10-CM

## 2021-09-20 DIAGNOSIS — E88.81 METABOLIC SYNDROME: ICD-10-CM

## 2021-09-20 DIAGNOSIS — G47.419 PRIMARY NARCOLEPSY WITHOUT CATAPLEXY: Primary | ICD-10-CM

## 2021-09-20 DIAGNOSIS — I10 ESSENTIAL HYPERTENSION: Primary | ICD-10-CM

## 2021-09-20 PROCEDURE — G0008 ADMIN INFLUENZA VIRUS VAC: HCPCS | Performed by: FAMILY MEDICINE

## 2021-09-20 PROCEDURE — G8427 DOCREV CUR MEDS BY ELIG CLIN: HCPCS | Performed by: FAMILY MEDICINE

## 2021-09-20 PROCEDURE — G8399 PT W/DXA RESULTS DOCUMENT: HCPCS | Performed by: FAMILY MEDICINE

## 2021-09-20 PROCEDURE — 1036F TOBACCO NON-USER: CPT | Performed by: PHYSICIAN ASSISTANT

## 2021-09-20 PROCEDURE — 4040F PNEUMOC VAC/ADMIN/RCVD: CPT | Performed by: PHYSICIAN ASSISTANT

## 2021-09-20 PROCEDURE — 1090F PRES/ABSN URINE INCON ASSESS: CPT | Performed by: FAMILY MEDICINE

## 2021-09-20 PROCEDURE — G8399 PT W/DXA RESULTS DOCUMENT: HCPCS | Performed by: PHYSICIAN ASSISTANT

## 2021-09-20 PROCEDURE — G8427 DOCREV CUR MEDS BY ELIG CLIN: HCPCS | Performed by: PHYSICIAN ASSISTANT

## 2021-09-20 PROCEDURE — 90694 VACC AIIV4 NO PRSRV 0.5ML IM: CPT | Performed by: FAMILY MEDICINE

## 2021-09-20 PROCEDURE — 99214 OFFICE O/P EST MOD 30 MIN: CPT | Performed by: FAMILY MEDICINE

## 2021-09-20 PROCEDURE — 99213 OFFICE O/P EST LOW 20 MIN: CPT | Performed by: PHYSICIAN ASSISTANT

## 2021-09-20 PROCEDURE — 1090F PRES/ABSN URINE INCON ASSESS: CPT | Performed by: PHYSICIAN ASSISTANT

## 2021-09-20 PROCEDURE — 3017F COLORECTAL CA SCREEN DOC REV: CPT | Performed by: FAMILY MEDICINE

## 2021-09-20 PROCEDURE — 3017F COLORECTAL CA SCREEN DOC REV: CPT | Performed by: PHYSICIAN ASSISTANT

## 2021-09-20 PROCEDURE — 1123F ACP DISCUSS/DSCN MKR DOCD: CPT | Performed by: FAMILY MEDICINE

## 2021-09-20 PROCEDURE — G8417 CALC BMI ABV UP PARAM F/U: HCPCS | Performed by: FAMILY MEDICINE

## 2021-09-20 PROCEDURE — 4040F PNEUMOC VAC/ADMIN/RCVD: CPT | Performed by: FAMILY MEDICINE

## 2021-09-20 PROCEDURE — G8417 CALC BMI ABV UP PARAM F/U: HCPCS | Performed by: PHYSICIAN ASSISTANT

## 2021-09-20 PROCEDURE — 1036F TOBACCO NON-USER: CPT | Performed by: FAMILY MEDICINE

## 2021-09-20 PROCEDURE — 1123F ACP DISCUSS/DSCN MKR DOCD: CPT | Performed by: PHYSICIAN ASSISTANT

## 2021-09-20 RX ORDER — METHYLPHENIDATE HYDROCHLORIDE 10 MG/1
10 TABLET ORAL 2 TIMES DAILY
Qty: 180 TABLET | Refills: 0 | Status: SHIPPED | OUTPATIENT
Start: 2021-09-20 | End: 2022-09-19 | Stop reason: SDUPTHER

## 2021-09-20 RX ORDER — VENLAFAXINE HYDROCHLORIDE 150 MG/1
CAPSULE, EXTENDED RELEASE ORAL
Qty: 180 CAPSULE | Refills: 1 | Status: SHIPPED | OUTPATIENT
Start: 2021-09-20 | End: 2022-03-29

## 2021-09-20 RX ORDER — RIZATRIPTAN BENZOATE 10 MG/1
10 TABLET ORAL
Qty: 36 TABLET | Refills: 1 | Status: SHIPPED | OUTPATIENT
Start: 2021-09-20 | End: 2022-09-29

## 2021-09-20 RX ORDER — LOSARTAN POTASSIUM 25 MG/1
25 TABLET ORAL NIGHTLY
Qty: 90 TABLET | Refills: 1 | Status: SHIPPED | OUTPATIENT
Start: 2021-09-20 | End: 2022-09-27

## 2021-09-20 RX ORDER — ATORVASTATIN CALCIUM 80 MG/1
80 TABLET, FILM COATED ORAL DAILY
Qty: 90 TABLET | Refills: 1 | Status: SHIPPED | OUTPATIENT
Start: 2021-09-20 | End: 2022-09-09

## 2021-09-20 NOTE — PROGRESS NOTES
Immunizations Administered     Name Date Dose Route    Influenza, Quadv, adjuvanted, 65 yrs +, IM, PF (Fluad) 9/20/2021 0.5 mL Intramuscular    Site: Deltoid- Left    Lot: [id number]    NDC: 52244-779-63      Patient tolerated well. No adverse reaction noted at this time.

## 2021-09-20 NOTE — PROGRESS NOTES
[facility] [place]                                         [id number]  2021   Chief Complaint   Patient presents with    Follow-up     Hypersomnia medication follow up 1 year         Pt of Dr. Alejandrina Fitzpatrick 8  [address]    Presentation:   Roshan Goff presents for sleep medicine follow up for narcolepsy  Since the last visit Roshan Goff has been doing reasonably well with Nuvigil. She takes it some days but not everyday. Symptoms of hypersomnia are improved. Occ she will take a Ritalin. She will takes naps as needed on days she skips her medicines. Progress History:   Since last visit any new medical issues? Yes Ankle replacement  New ER or hospitlal visits? No  Any new or changes in medicines? No  Any new sleep medicines? No    Sleep issues:  Do you feel better? Yes  More rested? Yes   Better concentration?  yes      Past Medical History:   Diagnosis Date    Asthma 2013    Dr. Yulisa Gómez Depression       from [facility] neck    Fibromyalgia    [address] [place] [address] Hyperlipemia     check per Dr Kandace Tatum Insulin resistance     Narcolepsy     per Dr. Sandra Coburn via sleep study    Neck pain     cervical stenosis    PLMD (periodic limb movement disorder)     Prolonged emergence from general anesthesia     Tachycardia        Past Surgical History:   Procedure Laterality Date    ANKLE SURGERY Left 2017    OIO    ANKLE SURGERY Left 2020    Replacement and tendon repair    CARPAL TUNNEL RELEASE Right     CERVICAL DISCECTOMY  2012    C3-4, C4-5    CERVICAL FUSION N/A 2019    REMOVAL OF PLATE X7-4, ACDF D5-2, C6-7 WITH PLATING/MEDTRONIC performed by Ama Valladares MD at [place] [address]     [address] WITH STENT PLACEMENT  2/12/15    Dr. Lilly Tapia  2015    FOOT SURGERY Right     bone removal from foot on rt    FOOT SURGERY Left     bone spur, tarsal tunnel and cyst removal on left    HYSTERECTOMY  1996    total, endometriosis    JOINT REPLACEMENT  2009    bilateral knee    LAPAROSCOPY  2010    for endometriosis    TONSILLECTOMY AND ADENOIDECTOMY  2006    UPPER GASTROINTESTINAL ENDOSCOPY  2006       Social History     Tobacco Use    Smoking status: Never Smoker    Smokeless tobacco: Never Used   Vaping Use    Vaping Use: Never used   Substance Use Topics    Alcohol use: Yes     Comment: rarely    Drug use: No       Allergies   Allergen Reactions    Bactrim Other (See Comments)     Metallic taste    Iv Dye [Iodides] Nausea Only and Other (See Comments)     Says older IVP dye causes Stomach pains    Tolectin [Tolmetin Sodium] Other (See Comments)     Metallic taste    Claforan [Cefotaxime Sodium In D5w] Hives and Rash    Pcn [Penicillins] Hives and Rash       Current Outpatient Medications   Medication Sig Dispense Refill    budesonide-formoterol (SYMBICORT) 160-4.5 MCG/ACT AERO Inhale 2 puffs into the lungs 2 times daily Rinse mouth after its use. 3 each 3    ARIPiprazole (ABILIFY) 5 MG tablet TAKE 1 TABLET BY MOUTH ONCE DAILY 90 tablet 1    atenolol (TENORMIN) 50 MG tablet TAKE 1 AND 1/2 TABLETS BY MOUTH ONCE DAILY 135 tablet 1    omeprazole (PRILOSEC) 40 MG delayed release capsule TAKE 1 CAPSULE BY MOUTH EVERY MORNING BEFORE BREAKFAST 90 capsule 1    topiramate (TOPAMAX) 50 MG tablet TAKE 1 TABLET BY MOUTH EVERY MORNING AND TAKE 2 TABLETS BY MOUTH IN THE AFTERNOON 270 tablet 1    metFORMIN (GLUCOPHAGE-XR) 500 MG extended release tablet TAKE 4 TABLETS BY MOUTH EVERY EVENING. 360 tablet 1    tiZANidine (ZANAFLEX) 4 MG tablet TAKE 1 TABLET BY MOUTH NIGHTLY AT BEDTIME AS NEEDED 90 tablet 1    Armodafinil 200 MG TABS TAKE 1 TABLET BY MOUTH ONCE DAILY. 90 tablet 2    montelukast (SINGULAIR) 10 MG tablet Take 1 tablet by mouth nightly 30 tablet 11    venlafaxine (EFFEXOR XR) 150 MG extended release capsule TAKE 1 CAPSULE BY MOUTH TWICE DAILY. 180 capsule 1    fluticasone (FLONASE) 50 MCG/ACT nasal spray 1 spray by Nasal route daily 3 Bottle 1    atorvastatin (LIPITOR) 80 MG tablet Take 1 tablet by mouth daily 90 tablet 1    levalbuterol (XOPENEX HFA) 45 MCG/ACT inhaler Inhale 2 puffs into the lungs every 6 hours as needed for Wheezing or Shortness of Breath 1 Inhaler 11    nitroGLYCERIN (NITROSTAT) 0.4 MG SL tablet Place 1 tablet under the tongue every 5 minutes as needed for Chest pain. 25 tablet 3    losartan (COZAAR) 25 MG tablet Take 1 tablet by mouth daily. (Patient taking differently: Take 25 mg by mouth nightly ) 30 tablet 3    Multiple Vitamins-Minerals (THERAPEUTIC MULTIVITAMIN-MINERALS) tablet Take 1 tablet by mouth daily.  Coenzyme Q10 (CO Q 10) 10 MG CAPS Take 1 capsule by mouth daily as needed       Calcium Carbonate-Vitamin D (CALCIUM + D PO) Take 1,200 mg by mouth daily.  LYSINE Take 1,000 mg by mouth 2 times daily as needed       rizatriptan (MAXALT) 10 MG tablet Take 1 tablet by mouth once as needed for Migraine May repeat in 2 hours if needed 36 tablet 1     No current facility-administered medications for this visit.        Family History   Problem Relation Age of Onset    COPD Father     Heart Disease Father 61        ekg changes    Cancer Sister 50        Multiple malaran     Irritable Bowel Syndrome Sister     Arthritis Mother 48    COPD Maternal Aunt     Severe Sprains Maternal Uncle         epilepsy     Cancer Paternal Uncle 58        lung    Diabetes Maternal Grandmother     Kidney Disease Maternal Grandmother 72        Failure form DM     Cancer Maternal Grandfather         Mets all over     Coronary Art Dis Paternal Grandfather 35    Heart Disease Paternal Grandfather 35    Heart Attack Paternal Grandfather 35    Cancer Maternal Uncle         Lung     Stroke Maternal Uncle     Diabetes Maternal Uncle         NIDDM         Review of Systems -   General ROS: stable / unchanged  ENT ROS: negative for - nasal congestion, oral lesions or sore throat  Hematological andLymphatic ROS: negative  Endocrine ROS: negative  Respiratory ROS: no cough, shortness of breath, or wheezing  Cardiovascular ROS: no chest pain or dyspnea on exertion  Gastrointestinal ROS: no abdominal pain,change in bowel habits, or black or bloody stools  Musculoskeletal ROS: negative  Neurological ROS: negative    Physical Exam:    BMI:  Body mass index is 27.1 kg/m². Wt Readings from Last 3 Encounters:   09/20/21 178 lb 3.2 oz (80.8 kg)   09/01/21 177 lb 6.4 oz (80.5 kg)   03/15/21 171 lb (77.6 kg)     Vitals: /64 (Site: Left Upper Arm, Position: Sitting, Cuff Size: Large Adult)   Pulse 100   Temp 97.8 °F (36.6 °C) (Temporal)   Ht 5' 8\" (1.727 m)   Wt 178 lb 3.2 oz (80.8 kg)   SpO2 98%   BMI 27.10 kg/m²       Physical Exam  Constitutional:       Appearance: She is well-developed. HENT:      Head: Normocephalic and atraumatic. Right Ear: External ear normal.      Left Ear: External ear normal.   Eyes:      Conjunctiva/sclera: Conjunctivae normal.      Pupils: Pupils are equal, round, and reactive to light. Cardiovascular:      Rate and Rhythm: Normal rate and regular rhythm. Heart sounds: Normal heart sounds. Pulmonary:      Effort: Pulmonary effort is normal.      Breath sounds: Normal breath sounds. Musculoskeletal:         General: Normal range of motion. Cervical back: Normal range of motion and neck supple. Skin:     General: Skin is warm and dry. Neurological:      Mental Status: She is alert and oriented to person, place, and time. Psychiatric:         Behavior: Behavior normal.         Thought Content: Thought content normal.         Judgment: Judgment normal.            Assessment      Diagnosis Orders   1.  Primary narcolepsy without cataplexy            Plan   - Continue Nuvigil and Ritalin as needed  - She call my officefor earlier appointment if needed for worsening of sleep symptoms.   - She was instructed on weight loss  - Kimberly Millan was educated about my impression and plan. Patient verbalizes understanding.   We will see Stephanie Ribera back in: 1 year     Kathie Feliz  9/20/2021

## 2021-09-20 NOTE — PROGRESS NOTES
[address]  [address] [place], [address]  Ph:   [phone]  Fax: [phone]    Provider:  Dr. Jackelin Brenner    Patient:  Jacklyn Zhou  YOB: 1955      Visit Date:  2021     Reason For Visit:   Chief Complaint   Patient presents with    6 Month Follow-Up     Hypercholesterolemia         José Luis Lozoya is a 73 y.o. female who comes today to the office for follow up of hypoplastic thyroid, depression, hypertension, hypercholesterolemia and fibromyalgia. She had cervical fusion in 2019. She is recuperating very well    Benign follicular nodule: biopsy done 2020, right thyroid nodule. MRI of cervical spine done in 2019 showed hypoplastic left thyroid nodule. US thyroid done in 2020 showed a solid heterogeneous vascular right thyroid lobe nodule which was low to intermediate suspicious due to its size mets criteria for ultrasound-guided fine needle aspiration which was done in 2020 which showed a benign follicular nodule. Depression: She is taking Abilify 5 mg every day and Effexor  mg 1 tablet PO BID. She denies any side effects from it. She feels that she is stable. Hyperlipidemia:   She is taking Lipitor 80 mg 1 tablet PO daily. She has low HDL and elevated LDL. She denies side efects from the medicines, but sometimes she has mild leg cramping and wonders if it is related to the lipitor. She is taking Co enzyme Q 10 200 mg PO daily. She tries to follow a low cholesterol diet. Last blood test done in 2021 showed Total cholesterol 187, LDL 85, Triglycerides 292 and HDL 44. There is not a family history of hyperlipidemia. There is a family history of early ischemic heart disease. Paternal grandfather  in his 29's from massive heart attack and her uncle had a MI at 61.  She is not exercising at the present time.      HTN/CAD:  She is taking Cozaar 25 mg 1 tablet PO daily, Tenormin 50 mg 1 1/2 MORNING AND TAKE 2 TABLETS BY MOUTH IN THE AFTERNOON 270 tablet 1    metFORMIN (GLUCOPHAGE-XR) 500 MG extended release tablet TAKE 4 TABLETS BY MOUTH EVERY EVENING. 360 tablet 1    tiZANidine (ZANAFLEX) 4 MG tablet TAKE 1 TABLET BY MOUTH NIGHTLY AT BEDTIME AS NEEDED 90 tablet 1    Armodafinil 200 MG TABS TAKE 1 TABLET BY MOUTH ONCE DAILY. 90 tablet 2    montelukast (SINGULAIR) 10 MG tablet Take 1 tablet by mouth nightly 30 tablet 11    venlafaxine (EFFEXOR XR) 150 MG extended release capsule TAKE 1 CAPSULE BY MOUTH TWICE DAILY. 180 capsule 1    rizatriptan (MAXALT) 10 MG tablet Take 1 tablet by mouth once as needed for Migraine May repeat in 2 hours if needed 36 tablet 1    fluticasone (FLONASE) 50 MCG/ACT nasal spray 1 spray by Nasal route daily 3 Bottle 1    atorvastatin (LIPITOR) 80 MG tablet Take 1 tablet by mouth daily 90 tablet 1    levalbuterol (XOPENEX HFA) 45 MCG/ACT inhaler Inhale 2 puffs into the lungs every 6 hours as needed for Wheezing or Shortness of Breath 1 Inhaler 11    nitroGLYCERIN (NITROSTAT) 0.4 MG SL tablet Place 1 tablet under the tongue every 5 minutes as needed for Chest pain. 25 tablet 3    losartan (COZAAR) 25 MG tablet Take 1 tablet by mouth daily. (Patient taking differently: Take 25 mg by mouth nightly ) 30 tablet 3    Multiple Vitamins-Minerals (THERAPEUTIC MULTIVITAMIN-MINERALS) tablet Take 1 tablet by mouth daily.  Coenzyme Q10 (CO Q 10) 10 MG CAPS Take 1 capsule by mouth daily as needed       Calcium Carbonate-Vitamin D (CALCIUM + D PO) Take 1,200 mg by mouth daily.  LYSINE Take 1,000 mg by mouth 2 times daily as needed        No current facility-administered medications for this visit.        Review of systems:  Review of Systems - History obtained from chart review and the patient  General ROS: negative for - chills, fatigue or fever  ENT ROS: positive for - headaches, better since then cervical spine surgery  Psychologic ROS: Positive for depression and stress, better since last visit  Hematological and Lymphatic ROS: negative for - bruising  Endocrine ROS: negative for - malaise/lethargy, polydipsia/polyuria or temperature intolerance  Respiratory ROS: negative for - cough,  shortness of breath or wheezing  Cardiovascular ROS: negative for - chest pain or edema  Dermatological ROS: negative for- rash    Physical Examination:  /77   Pulse 102   Ht 5' 8\" (1.727 m)   Wt 177 lb 6.4 oz (80.5 kg)   BMI 26.97 kg/m²     General-  Alert and oriented x 3, NAD  HEENT: NC, AT, PERRLA, EOMI, anicteric sclerae  Ears: Normal tympanic membranes bilaterally  Nose: patent, no lesions  Mouth: no lesions, moist mucosas  Neck - supple, no significant adenopathy  Chest - clear to auscultation, no wheezes, rales or rhonchi, symmetric air entry  Heart - normal rate, regular rhythm, normal S1, S2, no murmurs, rubs, clicks or gallops  Abdomen - soft, nontender, nondistended, no masses or organomegaly  Extremities - no pedal edema, no clubbing or cyanosis    Impression:   Diagnosis Orders   1. Essential hypertension     2. Hypercholesterolemia     3. Moderate recurrent major depression ([address]     4. Vitamin D deficiency  Vitamin D 25 Hydroxy   5. Metabolic syndrome     6. Colloid thyroid nodule         Plan:    Continue  Effexor  mg 2 PO daily  Continue Abilify 5 mg 1 tablet PO daily. Continue Cozaar 25 mg 1 tablet PO daily  Continue Tenormin 50 mg 1 1/2 tablet PO daily. Continue  Glucophage  mg 4 tablets PO daily   Continue Topamax 50 mg 1 tablet by mouth in a.m., 2 tablets by mouth in the afternoon. Continue Prilosec 20 mg 1 tab by mouth daily. Continue vitamin D 2000 international units 3 PO daily        No orders of the defined types were placed in this encounter. Follow Up:  Return in about 6 months (around 3/20/2022).     Ghislaine England MD

## 2021-10-06 NOTE — PROGRESS NOTES
limitation due to respiratory symptoms. Her past medical history is significant for Asthma, neck pain, insulin resistance, tachycardia, depression, fibromyalgia, narcolepsy (follows [facility]), and HLD. Asthma control (severity) questionnaire:  Asthma symptoms:  > 2 times per week:  No  Nighttime awakenings: > 2 times per month: No  Use of ILAN for symptoms control (other than pre-exercise use):> 2 times per week: No  Interference with normal activity: No  Lung function: FEV1 >60% predicted: Yes    Number of exacerbations per year: > 2: No     Progress History:   Since last visit any new medical issues? No  New ER or hospital visits? No  Any new or changes in medicines? No  Using inhalers? Yes Dulera and as needed Xopenex inhaler  Are they helpful? Yes   Previous inhalers? Albuterol - fast heart rate   Any recent exacerbations? Yes, in August she had a bronchitis flare and was given a steroid injection and azithromycin  Last PFT: 2020  Last 6 MWT: None in Epic    Smoking History:  Never smoker  Admits to passive tobacco exposure from parents or work environment. She reports that one of her parents smoked. Social History:  Patient job history: Retired in August, was an RN at [employer]  She has not had exposure to aerosolized particles or hazardous fumes. (Coal, dust, asbestos, molds ie Hay)  [place]. Admits to exposure to pets/animals at home. 1 dog and 2 cats  Denies exposure to tuberculosis.     Flu vaccine: reports that she has received   Pneumonia vaccine: Melissa Sermon 2015 & [id number] 2005  COVID-19 vaccine: Vaccinated  Past Medical hx   PMH:  Past Medical History:   Diagnosis Date    Asthma     Dr. Trenton Anne Depression 2000      [facility] neck    Fibromyalgia    [facility] Hyperlipemia     check per Dr Reji De La Fuente Insulin resistance 2011    Narcolepsy     per Dr. Rae Sample via sleep study    Neck pain     cervical stenosis    PLMD (periodic lung    Diabetes Maternal Grandmother     Kidney Disease Maternal Grandmother 72        Failure form DM     Cancer Maternal Grandfather         Mets all over     Coronary Art Dis Paternal Grandfather 35    Heart Disease Paternal Grandfather 35    Heart Attack Paternal Grandfather 35    Cancer Maternal Uncle         Lung     Stroke Maternal Uncle     Diabetes Maternal Uncle         NIDDM      CURRENT MEDICATIONS:  Current Outpatient Medications   Medication Sig Dispense Refill    mometasone-formoterol (DULERA) 200-5 MCG/ACT inhaler Inhale 2 puffs into the lungs 2 times daily 13 g 11    levalbuterol (XOPENEX HFA) 45 MCG/ACT inhaler Inhale 2 puffs into the lungs every 6 hours as needed for Wheezing or Shortness of Breath 15 g 11    methylphenidate (RITALIN) 10 MG tablet Take 1 tablet by mouth 2 times daily for 90 days. 180 tablet 0    venlafaxine (EFFEXOR XR) 150 MG extended release capsule TAKE 1 CAPSULE BY MOUTH TWICE DAILY. 180 capsule 1    atorvastatin (LIPITOR) 80 MG tablet Take 1 tablet by mouth daily 90 tablet 1    losartan (COZAAR) 25 MG tablet Take 1 tablet by mouth nightly 90 tablet 1    ARIPiprazole (ABILIFY) 5 MG tablet TAKE 1 TABLET BY MOUTH ONCE DAILY 90 tablet 1    atenolol (TENORMIN) 50 MG tablet TAKE 1 AND 1/2 TABLETS BY MOUTH ONCE DAILY 135 tablet 1    omeprazole (PRILOSEC) 40 MG delayed release capsule TAKE 1 CAPSULE BY MOUTH EVERY MORNING BEFORE BREAKFAST 90 capsule 1    topiramate (TOPAMAX) 50 MG tablet TAKE 1 TABLET BY MOUTH EVERY MORNING AND TAKE 2 TABLETS BY MOUTH IN THE AFTERNOON 270 tablet 1    metFORMIN (GLUCOPHAGE-XR) 500 MG extended release tablet TAKE 4 TABLETS BY MOUTH EVERY EVENING. 360 tablet 1    tiZANidine (ZANAFLEX) 4 MG tablet TAKE 1 TABLET BY MOUTH NIGHTLY AT BEDTIME AS NEEDED 90 tablet 1    Armodafinil 200 MG TABS TAKE 1 TABLET BY MOUTH ONCE DAILY.  90 tablet 2    montelukast (SINGULAIR) 10 MG tablet Take 1 tablet by mouth nightly 30 tablet 11    fluticasone (FLONASE) 50 MCG/ACT nasal spray 1 spray by Nasal route daily 3 Bottle 1    nitroGLYCERIN (NITROSTAT) 0.4 MG SL tablet Place 1 tablet under the tongue every 5 minutes as needed for Chest pain. 25 tablet 3    Multiple Vitamins-Minerals (THERAPEUTIC MULTIVITAMIN-MINERALS) tablet Take 1 tablet by mouth daily.  Coenzyme Q10 (CO Q 10) 10 MG CAPS Take 1 capsule by mouth daily as needed       Calcium Carbonate-Vitamin D (CALCIUM + D PO) Take 1,200 mg by mouth daily.  LYSINE Take 1,000 mg by mouth 2 times daily as needed       rizatriptan (MAXALT) 10 MG tablet Take 1 tablet by mouth once as needed for Migraine May repeat in 2 hours if needed 36 tablet 1     No current facility-administered medications for this visit. Caren TILLEY   Review of Systems   Constitutional: Negative for appetite change, chills, fever and weight loss. HENT: Positive for rhinorrhea. Negative for congestion, postnasal drip, sinus pressure, sinus pain, sneezing and sore throat. Eyes: Positive for itching. Respiratory: Positive for shortness of breath and wheezing. Negative for cough, hemoptysis, sputum production and chest tightness. Cardiovascular: Positive for dyspnea on exertion. Negative for chest pain, palpitations and leg swelling. Gastrointestinal: Negative for abdominal pain, constipation and diarrhea. Genitourinary: Negative for difficulty urinating. Allergic/Immunologic: Negative for environmental allergies. Physical exam   /84   Pulse 94   Ht 5' 8\" (1.727 m)   Wt 180 lb (81.6 kg)   SpO2 98% Comment: r/a  BMI 27.37 kg/m²    Wt Readings from Last 3 Encounters:   10/19/21 180 lb (81.6 kg)   09/20/21 177 lb 6.4 oz (80.5 kg)   09/20/21 178 lb 3.2 oz (80.8 kg)       Physical Exam  Constitutional:       Appearance: She is well-developed. Comments: BMI 27   HENT:      Right Ear: External ear normal.      Left Ear: External ear normal.      Nose: No congestion or rhinorrhea.       Mouth/Throat: Mouth: Mucous membranes are moist.      Pharynx: Oropharynx is clear. No oropharyngeal exudate. Eyes:      General:         Right eye: No discharge. Left eye: No discharge. Cardiovascular:      Rate and Rhythm: Normal rate and regular rhythm. Pulmonary:      Effort: Pulmonary effort is normal.      Breath sounds: No wheezing, rhonchi or rales. Chest:      Chest wall: No tenderness. Abdominal:      General: There is no distension. Tenderness: There is no abdominal tenderness. Musculoskeletal:      Cervical back: Neck supple. Right lower leg: No edema. Left lower leg: No edema. Skin:     Capillary Refill: Capillary refill takes less than 2 seconds. Neurological:      General: No focal deficit present. Mental Status: She is alert. Psychiatric:         Mood and Affect: Mood normal.         Behavior: Behavior normal.         Thought Content: Thought content normal.         Judgment: Judgment normal.          Results   Lung Nodule Screening     [] Qualifies    [x] Does not qualify   [] Declined    [] Completed  Never smoker   The USPSTF recommends annual screening for lung cancer with low-dose computed tomography (LDCT) in adults aged 48 to 80 years who have a 20 pack-year smoking history and currently smoke or have quit within the past 15 years. Screening should be discontinued once a person has not smoked for 15 years or develops a health problem that substantially limits life expectancy or the ability or willingness to have curative lung surgery. PFT 9/24/2020 (Reviewed) Mild obstruction and decreased DLCO        Assessment      Diagnosis Orders   1. Asthma-COPD overlap syndrome (HCC)  mometasone-formoterol (DULERA) 200-5 MCG/ACT inhaler    Spirometry With Bronchodilator    Alpha-1-Antitrypsin    levalbuterol (XOPENEX HFA) 45 MCG/ACT inhaler   2. Decreased diffusion capacity of lung  Spirometry With Bronchodilator    Alpha-1-Antitrypsin         Plan   1. Asthma-COPD overlap syndrome ([facility] [address]  - Symptoms are well controlled on Dulera. Will continue at current dose. - Discussed levalbuterol inhaler use. Reviewed signs and symptoms indicating the need for use including shortness of breath and wheezing. Discussed with the patient the importance of using the inhaler within the prescribed time frames. Patient verbalized understanding to use within the prescribed time frame. Patient also verbalized understanding that if there is no relief of symptoms after using albuterol and resting for 15 minutes they need to go to nearest ER or call 911.  - mometasone-formoterol (DULERA) 200-5 MCG/ACT inhaler; Inhale 2 puffs into the lungs 2 times daily  Dispense: 13 g; Refill: 11  - Spirometry With Bronchodilator prior to next appointment  - Alpha-1-Antitrypsin swab in office today  - levalbuterol (XOPENEX HFA) 45 MCG/ACT inhaler; Inhale 2 puffs into the lungs every 6 hours as needed for Wheezing or Shortness of Breath  Dispense: 15 g; Refill: 11  - Advised to maintain pneumonia vaccine with PCP and to take flu vaccine this coming season. 2. Decreased diffusion capacity of lung  - Will obtain Spirometry With Bronchodilator prior to next appointment  - Alpha-1-Antitrypsin swab in office today    Advised patient to call office with any changes, questions, or concerns regarding respiratory status or issues with prescribed medications    Return in about 1 year (around 10/19/2022) for Asthma/COPD with rene prior (rafaela patient).        Electronically signed by JENNIFER Lam CNP on 10/19/2021 at 2:13 PM

## 2021-10-19 ENCOUNTER — OFFICE VISIT (OUTPATIENT)
Dept: PULMONOLOGY | Age: 66
End: 2021-10-19
Payer: MEDICARE

## 2021-10-19 VITALS
BODY MASS INDEX: 27.28 KG/M2 | WEIGHT: 180 LBS | OXYGEN SATURATION: 98 % | SYSTOLIC BLOOD PRESSURE: 110 MMHG | HEART RATE: 94 BPM | DIASTOLIC BLOOD PRESSURE: 84 MMHG | HEIGHT: 68 IN

## 2021-10-19 DIAGNOSIS — J44.9 ASTHMA-COPD OVERLAP SYNDROME (HCC): Primary | ICD-10-CM

## 2021-10-19 DIAGNOSIS — R94.2 DECREASED DIFFUSION CAPACITY OF LUNG: ICD-10-CM

## 2021-10-19 PROBLEM — J44.89 ASTHMA-COPD OVERLAP SYNDROME: Status: ACTIVE | Noted: 2021-10-19

## 2021-10-19 PROCEDURE — 1036F TOBACCO NON-USER: CPT

## 2021-10-19 PROCEDURE — G8427 DOCREV CUR MEDS BY ELIG CLIN: HCPCS

## 2021-10-19 PROCEDURE — G8926 SPIRO NO PERF OR DOC: HCPCS

## 2021-10-19 PROCEDURE — G8484 FLU IMMUNIZE NO ADMIN: HCPCS

## 2021-10-19 PROCEDURE — G8417 CALC BMI ABV UP PARAM F/U: HCPCS

## 2021-10-19 PROCEDURE — 3023F SPIROM DOC REV: CPT

## 2021-10-19 PROCEDURE — 4040F PNEUMOC VAC/ADMIN/RCVD: CPT

## 2021-10-19 PROCEDURE — 1090F PRES/ABSN URINE INCON ASSESS: CPT

## 2021-10-19 PROCEDURE — 3017F COLORECTAL CA SCREEN DOC REV: CPT

## 2021-10-19 PROCEDURE — 1123F ACP DISCUSS/DSCN MKR DOCD: CPT

## 2021-10-19 PROCEDURE — G8399 PT W/DXA RESULTS DOCUMENT: HCPCS

## 2021-10-19 PROCEDURE — 99214 OFFICE O/P EST MOD 30 MIN: CPT

## 2021-10-19 RX ORDER — LEVALBUTEROL TARTRATE 45 UG/1
2 AEROSOL, METERED ORAL EVERY 6 HOURS PRN
Qty: 15 G | Refills: 11 | Status: SHIPPED | OUTPATIENT
Start: 2021-10-19 | End: 2022-10-17 | Stop reason: SDUPTHER

## 2021-10-19 ASSESSMENT — ENCOUNTER SYMPTOMS
WHEEZING: 1
CHEST TIGHTNESS: 0
ABDOMINAL PAIN: 0
SINUS PRESSURE: 0
SHORTNESS OF BREATH: 1
SINUS PAIN: 0
SORE THROAT: 0
SPUTUM PRODUCTION: 0
EYE ITCHING: 1
HEMOPTYSIS: 0
DIARRHEA: 0
COUGH: 0
CONSTIPATION: 0
RHINORRHEA: 1

## 2021-10-19 NOTE — PATIENT INSTRUCTIONS
Continue Dulera. Make sure to rinse and spit after use. Continue your levalbuterol inhaler. You may take 2 puffs every 6 hours as needed for shortness of breath or wheezing. We will see you back in 1 year with rene prior      Asthma in Adults: Care Instructions  Overview     Asthma makes it hard for you to breathe. During an asthma attack, the airways swell and narrow. Severe asthma attacks can be dangerous, but you can usually prevent them. Controlling asthma and treating symptoms before they get bad can help you avoid bad attacks. You may also avoid future trips to the doctor. Follow-up care is a key part of your treatment and safety. Be sure to make and go to all appointments, and call your doctor if you are having problems. It's also a good idea to know your test results and keep a list of the medicines you take. How can you care for yourself at home? 1. Follow your asthma action plan so you can manage your symptoms at home. An asthma action plan will help you prevent and control airway reactions and will tell you what to do during an asthma attack. If you do not have an asthma action plan, work with your doctor to build one.  2. Take your asthma medicine exactly as prescribed. Medicine plays an important role in controlling asthma. Talk to your doctor right away if you have any questions about what to take and how to take it. ? Use your quick-relief medicine when you have symptoms of an attack. Quick-relief medicine often is an albuterol inhaler. Some people need to use quick-relief medicine before they exercise. ? Take your controller medicine every day, not just when you have symptoms. Controller medicine is usually an inhaled corticosteroid. The goal is to prevent problems before they occur. ? If your doctor prescribed corticosteroid pills to use during an attack, take them as directed. They may take hours to work, but they may shorten the attack and help you breathe better. ?  Keep your quick-relief medicine with you at all times. 3. Talk to your doctor before using other medicines. Some medicines, such as aspirin, can cause asthma attacks in some people. 4. Check yourself for asthma symptoms to know which step to follow in your action plan. Watch for things like being short of breath, having chest tightness, coughing, and wheezing. Also notice if symptoms wake you up at night or if you get tired quickly when you exercise. 5. If you have a peak flow meter, use it to check how well you are breathing. This can help you predict when an asthma attack is going to occur. Then you can take medicine to prevent the asthma attack or make it less severe. 6. See your doctor regularly. These visits will help you learn more about asthma and what you can do to control it. Your doctor will monitor your treatment to make sure the medicine is helping you. 7. Keep track of your asthma attacks and your treatment. After you have had an attack, write down what triggered it, what helped end it, and any concerns you have about your asthma action plan. Take your diary when you see your doctor. You can then review your asthma action plan and decide if it is working. 8. Do not smoke or allow others to smoke around you. Avoid smoky places. Smoking makes asthma worse. If you need help quitting, talk to your doctor about stop-smoking programs and medicines. These can increase your chances of quitting for good. 9. Learn what triggers an asthma attack for you, and avoid the triggers when you can. Common triggers include colds, smoke, air pollution, dust, pollen, mold, pets, cockroaches, stress, and cold air. 10. Avoid colds and the flu. Talk to your doctor about getting a pneumococcal vaccine shot. If you have had one before, ask your doctor whether you need a second dose. Get a flu vaccine every fall. If you must be around people with colds or the flu, wash your hands often. When should you call for help?    Call 46 anytime you think you may need emergency care. For example, call if:    · You have severe trouble breathing. Call your doctor now or seek immediate medical care if:    · Your symptoms do not get better after you have followed your asthma action plan. · You cough up yellow, dark brown, or bloody mucus (sputum). Watch closely for changes in your health, and be sure to contact your doctor if:    · Your coughing and wheezing get worse. · You need to use quick-relief medicine on more than 2 days a week within a month (unless it is just for exercise). · You need help figuring out what is triggering your asthma attacks. Where can you learn more? Go to https://[employer]. org and sign in to your [employer] account. Enter P597 in the YouOS box to learn more about \"Asthma in Adults: Care Instructions. \"     If you do not have an account, please click on the \"Sign Up Now\" link. Current as of: October 26, 2020               Content Version: 12.9  © 2006-2021 [employer]. Care instructions adapted under license by [facility] ([facility]). If you have questions about a medical condition or this instruction, always ask your healthcare professional. [place] [address] any warranty or liability for your use of this information.

## 2021-11-05 ENCOUNTER — TELEPHONE (OUTPATIENT)
Dept: PULMONOLOGY | Age: 66
End: 2021-11-05

## 2021-11-05 NOTE — TELEPHONE ENCOUNTER
----- Message from JENNIFER Lam CNP sent at 11/5/2021  8:10 AM EDT -----  Please call patient to notify of normal A1A result. Thanks!

## 2021-12-13 ENCOUNTER — NURSE ONLY (OUTPATIENT)
Dept: LAB | Age: 66
End: 2021-12-13

## 2021-12-13 DIAGNOSIS — E78.00 HYPERCHOLESTEROLEMIA: ICD-10-CM

## 2021-12-13 DIAGNOSIS — I10 ESSENTIAL HYPERTENSION: ICD-10-CM

## 2021-12-13 DIAGNOSIS — E55.9 VITAMIN D DEFICIENCY: ICD-10-CM

## 2021-12-13 LAB
ALBUMIN SERPL-MCNC: 4.5 G/DL (ref 3.5–5.1)
ALP BLD-CCNC: 92 U/L (ref 38–126)
ALT SERPL-CCNC: 15 U/L (ref 11–66)
ANION GAP SERPL CALCULATED.3IONS-SCNC: 13 MEQ/L (ref 8–16)
AST SERPL-CCNC: 16 U/L (ref 5–40)
BACTERIA: ABNORMAL
BASOPHILS # BLD: 1.2 %
BASOPHILS ABSOLUTE: 0.1 THOU/MM3 (ref 0–0.1)
BILIRUB SERPL-MCNC: 0.3 MG/DL (ref 0.3–1.2)
BILIRUBIN URINE: NEGATIVE
BLOOD, URINE: NEGATIVE
BUN BLDV-MCNC: 9 MG/DL (ref 7–22)
CALCIUM SERPL-MCNC: 10 MG/DL (ref 8.5–10.5)
CASTS: ABNORMAL /LPF
CASTS: ABNORMAL /LPF
CHARACTER, URINE: CLEAR
CHLORIDE BLD-SCNC: 106 MEQ/L (ref 98–111)
CHOLESTEROL, TOTAL: 186 MG/DL (ref 100–199)
CO2: 26 MEQ/L (ref 23–33)
COLOR: YELLOW
CREAT SERPL-MCNC: 0.9 MG/DL (ref 0.4–1.2)
CREATININE, URINE: 74.7 MG/DL
CRYSTALS: ABNORMAL
EOSINOPHIL # BLD: 3.1 %
EOSINOPHILS ABSOLUTE: 0.3 THOU/MM3 (ref 0–0.4)
EPITHELIAL CELLS, UA: ABNORMAL /HPF
ERYTHROCYTE [DISTWIDTH] IN BLOOD BY AUTOMATED COUNT: 13.4 % (ref 11.5–14.5)
ERYTHROCYTE [DISTWIDTH] IN BLOOD BY AUTOMATED COUNT: 47.1 FL (ref 35–45)
GFR SERPL CREATININE-BSD FRML MDRD: 63 ML/MIN/1.73M2
GLUCOSE BLD-MCNC: 92 MG/DL (ref 70–108)
GLUCOSE, URINE: NEGATIVE MG/DL
HCT VFR BLD CALC: 47.6 % (ref 37–47)
HDLC SERPL-MCNC: 40 MG/DL
HEMOGLOBIN: 14.8 GM/DL (ref 12–16)
IMMATURE GRANS (ABS): 0.11 THOU/MM3 (ref 0–0.07)
IMMATURE GRANULOCYTES: 1 %
KETONES, URINE: NEGATIVE
LDL CHOLESTEROL CALCULATED: 86 MG/DL
LEUKOCYTE ESTERASE, URINE: ABNORMAL
LYMPHOCYTES # BLD: 23.2 %
LYMPHOCYTES ABSOLUTE: 2.5 THOU/MM3 (ref 1–4.8)
MCH RBC QN AUTO: 29.7 PG (ref 26–33)
MCHC RBC AUTO-ENTMCNC: 31.1 GM/DL (ref 32.2–35.5)
MCV RBC AUTO: 95.4 FL (ref 81–99)
MICROALBUMIN UR-MCNC: < 1.2 MG/DL
MICROALBUMIN/CREAT UR-RTO: 16 MG/G (ref 0–30)
MISCELLANEOUS LAB TEST RESULT: ABNORMAL
MONOCYTES # BLD: 4.8 %
MONOCYTES ABSOLUTE: 0.5 THOU/MM3 (ref 0.4–1.3)
NITRITE, URINE: NEGATIVE
NUCLEATED RED BLOOD CELLS: 0 /100 WBC
PH UA: 7 (ref 5–9)
PLATELET # BLD: 383 THOU/MM3 (ref 130–400)
PMV BLD AUTO: 9.9 FL (ref 9.4–12.4)
POTASSIUM SERPL-SCNC: 4.5 MEQ/L (ref 3.5–5.2)
PROTEIN UA: NEGATIVE MG/DL
RBC # BLD: 4.99 MILL/MM3 (ref 4.2–5.4)
RBC URINE: ABNORMAL /HPF
RENAL EPITHELIAL, UA: ABNORMAL
SEG NEUTROPHILS: 66.7 %
SEGMENTED NEUTROPHILS ABSOLUTE COUNT: 7.3 THOU/MM3 (ref 1.8–7.7)
SODIUM BLD-SCNC: 145 MEQ/L (ref 135–145)
SPECIFIC GRAVITY UA: 1.01 (ref 1–1.03)
TOTAL PROTEIN: 6.8 G/DL (ref 6.1–8)
TRIGL SERPL-MCNC: 299 MG/DL (ref 0–199)
TSH SERPL DL<=0.05 MIU/L-ACNC: 0.59 UIU/ML (ref 0.4–4.2)
UROBILINOGEN, URINE: 0.2 EU/DL (ref 0–1)
VITAMIN B-12: > 2000 PG/ML (ref 211–911)
VITAMIN D 25-HYDROXY: 37 NG/ML (ref 30–100)
WBC # BLD: 10.9 THOU/MM3 (ref 4.8–10.8)
WBC UA: ABNORMAL /HPF
YEAST: ABNORMAL

## 2021-12-22 ENCOUNTER — OFFICE VISIT (OUTPATIENT)
Dept: FAMILY MEDICINE CLINIC | Age: 66
End: 2021-12-22
Payer: MEDICARE

## 2021-12-22 VITALS
HEART RATE: 82 BPM | HEIGHT: 68 IN | WEIGHT: 178 LBS | SYSTOLIC BLOOD PRESSURE: 138 MMHG | BODY MASS INDEX: 26.98 KG/M2 | TEMPERATURE: 98.1 F | DIASTOLIC BLOOD PRESSURE: 77 MMHG | RESPIRATION RATE: 12 BRPM

## 2021-12-22 DIAGNOSIS — Z23 NEED FOR PROPHYLACTIC VACCINATION AGAINST STREPTOCOCCUS PNEUMONIAE (PNEUMOCOCCUS): ICD-10-CM

## 2021-12-22 DIAGNOSIS — Z12.31 ENCOUNTER FOR SCREENING MAMMOGRAM FOR BREAST CANCER: Primary | ICD-10-CM

## 2021-12-22 DIAGNOSIS — Z78.0 POSTMENOPAUSAL: ICD-10-CM

## 2021-12-22 DIAGNOSIS — Z71.89 LIVING WILL, COUNSELING/DISCUSSION: ICD-10-CM

## 2021-12-22 DIAGNOSIS — Z11.4 SCREENING FOR HUMAN IMMUNODEFICIENCY VIRUS WITHOUT PRESENCE OF RISK FACTORS: ICD-10-CM

## 2021-12-22 DIAGNOSIS — Z00.00 ROUTINE GENERAL MEDICAL EXAMINATION AT A HEALTH CARE FACILITY: ICD-10-CM

## 2021-12-22 DIAGNOSIS — Z11.59 ENCOUNTER FOR HCV SCREENING TEST FOR LOW RISK PATIENT: ICD-10-CM

## 2021-12-22 PROCEDURE — G0402 INITIAL PREVENTIVE EXAM: HCPCS | Performed by: FAMILY MEDICINE

## 2021-12-22 PROCEDURE — 90732 PPSV23 VACC 2 YRS+ SUBQ/IM: CPT | Performed by: FAMILY MEDICINE

## 2021-12-22 PROCEDURE — 4040F PNEUMOC VAC/ADMIN/RCVD: CPT | Performed by: FAMILY MEDICINE

## 2021-12-22 PROCEDURE — 3017F COLORECTAL CA SCREEN DOC REV: CPT | Performed by: FAMILY MEDICINE

## 2021-12-22 PROCEDURE — G0009 ADMIN PNEUMOCOCCAL VACCINE: HCPCS | Performed by: FAMILY MEDICINE

## 2021-12-22 PROCEDURE — 1123F ACP DISCUSS/DSCN MKR DOCD: CPT | Performed by: FAMILY MEDICINE

## 2021-12-22 ASSESSMENT — PATIENT HEALTH QUESTIONNAIRE - PHQ9
SUM OF ALL RESPONSES TO PHQ9 QUESTIONS 1 & 2: 0
SUM OF ALL RESPONSES TO PHQ QUESTIONS 1-9: 0
SUM OF ALL RESPONSES TO PHQ QUESTIONS 1-9: 0
1. LITTLE INTEREST OR PLEASURE IN DOING THINGS: 0
2. FEELING DOWN, DEPRESSED OR HOPELESS: 0
SUM OF ALL RESPONSES TO PHQ QUESTIONS 1-9: 0

## 2021-12-22 ASSESSMENT — LIFESTYLE VARIABLES: HOW OFTEN DO YOU HAVE A DRINK CONTAINING ALCOHOL: 0

## 2021-12-22 NOTE — PROGRESS NOTES
Immunizations Administered     Name Date Dose Route    Pneumococcal Polysaccharide ([id number]) 12/22/2021 0.5 mL Intramuscular    Site: Deltoid- Left    Lot: [id number]    NDC: 0569-8089-47

## 2021-12-22 NOTE — PROGRESS NOTES
Cardiovascular Disease Risk Counseling: Assessed the patient's risk to develop cardiovascular disease and reviewed main risk factors. Reviewed steps to reduce disease risk including:   · Making healthy food choices  · Being physically active and gradualy increasing activity levels   · Reduce weight and determine a healthy BMI goal  · Monitor blood pressure and treat if higher than 140/90 mmHg  · Maintain blood total cholesterol levels under 5 mmol/l or 190 mg/dl  · Maintain LDL cholesterol levels under 3.0 mmol/l or 115 mg/dl   · Control blood glucose levels  · Consider taking aspirin (75 mg daily), once blood pressure is controlled   Provided a follow up plan  Time spent (minutes): 3      Medicare Annual Wellness Visit    Name: Jake Elizabeth Date: 2021   MRN: [id number] Sex: Female   Age: 73 y.o. Ethnicity: Non- / Non    : 1955 Race: White (non-)      Negrita Morales is here for Medicare AWV    Screenings for behavioral, psychosocial and functional/safety risks, and cognitive dysfunction are all negative except as indicated below. These results, as well as other patient data from the [address] form, are documented in Flowsheets linked to this Encounter. Allergies   Allergen Reactions    Bactrim Other (See Comments)     Metallic taste    Iv Dye [Iodides] Nausea Only and Other (See Comments)     Says older IVP dye causes Stomach pains    Tolectin [Tolmetin Sodium] Other (See Comments)     Metallic taste    Claforan [Cefotaxime Sodium In D5w] Hives and Rash    Pcn [Penicillins] Hives and Rash         Prior to Visit Medications    Medication Sig Taking?  Authorizing Provider   mometasone-formoterol (DULERA) 200-5 MCG/ACT inhaler Inhale 2 puffs into the lungs 2 times daily Yes Sage Mata APRN - CNP   levalbuterol (XOPENEX HFA) 45 MCG/ACT inhaler Inhale 2 puffs into the lungs every 6 hours as needed for Wheezing or Shortness of Breath Yes JENNIFER Angelo - CNP   venlafaxine (EFFEXOR XR) 150 MG extended release capsule TAKE 1 CAPSULE BY MOUTH TWICE DAILY. Yes Dorothea Martinez MD   rizatriptan (MAXALT) 10 MG tablet Take 1 tablet by mouth once as needed for Migraine May repeat in 2 hours if needed Yes Dorothea Martinez MD   atorvastatin (LIPITOR) 80 MG tablet Take 1 tablet by mouth daily Yes Dorothea Martinez MD   losartan (COZAAR) 25 MG tablet Take 1 tablet by mouth nightly Yes Dorothea Martinez MD   ARIPiprazole (ABILIFY) 5 MG tablet TAKE 1 TABLET BY MOUTH ONCE DAILY Yes Dorothea Martinez MD   atenolol (TENORMIN) 50 MG tablet TAKE 1 AND 1/2 TABLETS BY MOUTH ONCE DAILY Yes Dorothea Martinez MD   omeprazole (PRILOSEC) 40 MG delayed release capsule TAKE 1 CAPSULE BY MOUTH EVERY MORNING BEFORE BREAKFAST Yes Dorothea Martinez MD   topiramate (TOPAMAX) 50 MG tablet TAKE 1 TABLET BY MOUTH EVERY MORNING AND TAKE 2 TABLETS BY MOUTH IN THE AFTERNOON Yes Dorothea Martinez MD   metFORMIN (GLUCOPHAGE-XR) 500 MG extended release tablet TAKE 4 TABLETS BY MOUTH EVERY EVENING. Yes Dorothea Martinez MD   tiZANidine (ZANAFLEX) 4 MG tablet TAKE 1 TABLET BY MOUTH NIGHTLY AT BEDTIME AS NEEDED Yes Dorothea Martinez MD   montelukast (SINGULAIR) 10 MG tablet Take 1 tablet by mouth nightly Yes Uri Lazaro APRN - CNP   fluticasone (FLONASE) 50 MCG/ACT nasal spray 1 spray by Nasal route daily Yes Dorothea Martinez MD   nitroGLYCERIN (NITROSTAT) 0.4 MG SL tablet Place 1 tablet under the tongue every 5 minutes as needed for Chest pain. Yes Harriet Maciel MD   Multiple Vitamins-Minerals (THERAPEUTIC MULTIVITAMIN-MINERALS) tablet Take 1 tablet by mouth daily. Yes Historical Provider, MD   Coenzyme Q10 (CO Q 10) 10 MG CAPS Take 1 capsule by mouth daily as needed  Yes Historical Provider, MD   Calcium Carbonate-Vitamin D (CALCIUM + D PO) Take 1,200 mg by mouth daily.    Yes Historical Provider, MD   LYSINE Take 1,000 mg by mouth 2 times daily as needed  Yes Historical Provider, MD         Past Medical History:   Diagnosis Date    Uncle     Diabetes Maternal Uncle         NIDDM     Cancer Maternal Cousin         lung Cancer     Cancer Daughter         Skin Cancer        CareTeam (Including outside providers/suppliers regularly involved in providing care):   Patient Care Team:  Diedra Simmonds, MD as PCP - General (Internal Medicine)  Diedra Simmonds, MD as PCP - [facility] Empaneled Provider    Wt Readings from Last 3 Encounters:   12/22/21 178 lb (80.7 kg)   10/19/21 180 lb (81.6 kg)   09/20/21 177 lb 6.4 oz (80.5 kg)     Vitals:    12/22/21 1415   BP: 138/77   Site: Right Upper Arm   Position: Sitting   Cuff Size: Large Adult   Pulse: 82   Resp: 12   Temp: 98.1 °F (36.7 °C)   TempSrc: Temporal   Weight: 178 lb (80.7 kg)   Height: 5' 8\" (1.727 m)     Body mass index is 27.06 kg/m². Based upon direct observation of the patient, evaluation of cognition reveals recent and remote memory intact. Patient's complete Health Risk Assessment and screening values have been reviewed and are found in Flowsheets. The following problems were reviewed today and where indicated follow up appointments were made and/or referrals ordered. Positive Risk Factor Screenings with Interventions:     Fall Risk:  Timed Up and Go Test > 12 seconds? (Complete if either Fall Risk answers are Yes): no  2 or more falls in past year?: (!) yes  Fall with injury in past year?: no  Fall Risk Interventions:    · Home safety tips provided            General Health and ACP:  General  In general, how would you say your health is?: Good  In the past 7 days, have you experienced any of the following?  New or Increased Pain, New or Increased Fatigue, Loneliness, Social Isolation, Stress or Anger?: (!) New or Increased Pain  Do you get the social and emotional support that you need?: Yes  Do you have a Living Will?: (!) No  Advance Directives     Power of  Living Will ACP-Advance Directive ACP-Power of     Not on File Not on File Not on File Not on File      General Health Risk Interventions:  · No Living Will: Patient referred to [place] Habits/Nutrition:  Health Habits/Nutrition  Do you exercise for at least 20 minutes 2-3 times per week?: (!) No  Have you lost any weight without trying in the past 3 months?: No  Do you eat only one meal per day?: No  Have you seen the dentist within the past year?: Yes  Body mass index: (!) 27.06  Health Habits/Nutrition Interventions:  · Inadequate physical activity:  patient agrees to increase physical activity as follows: enrolling in [employer]     Safety:  Safety  Do you have working smoke detectors?: Yes  Have all throw rugs been removed or fastened?: Yes  Do you have non-slip mats or surfaces in all bathtubs/showers?: (!) No  Do all of your stairways have a railing or banister?: (!) No  Are your doorways, halls and stairs free of clutter?: Yes  Do you always fasten your seatbelt when you are in a car?: Yes  Safety Interventions:  · Home safety tips provided       Personalized Preventive Plan   Current Health Maintenance Status  Immunization History   Administered Date(s) Administered    COVID-19, Pfizer, PF, 30mcg/0.3mL 03/18/2021, 04/08/2021, 12/07/2021    Hepatitis B 05/01/1991, 06/13/1991, 11/12/1991    Influenza A ([id number]) Vaccine PF IM 11/10/2009, 11/20/2009    Influenza Virus Vaccine 10/04/2013, 10/25/2014, 11/06/2015, 10/23/2017    Influenza, Quadv, IM, (6 mo and older Fluzone, Flulaval, Fluarix and 3 yrs and older Afluria) 09/17/2018, 10/18/2019    Influenza, Quadv, adjuvanted, 65 yrs +, IM, PF (Fluad) 09/20/2021    Pneumococcal Conjugate 13-valent ([id number]) 06/25/2015    Pneumococcal Polysaccharide ([id number]) 05/13/2005    Td vaccine (adult) 03/26/1998    Tdap (Boostrix, Adacel) 09/30/2009, 09/17/2018    Zoster Live (Zostavax) 10/15/2013    Zoster Recombinant (Shingrix) 09/17/2018, 10/18/2019        Health Maintenance   Topic Date Due    Breast cancer screen  12/04/2020    Pneumococcal 65+ years Vaccine (2 of 2 - PPSV23) 06/18/2021    Annual Wellness Visit (AWV)  Never done    Lipid screen  12/13/2022    Potassium monitoring  12/13/2022    Creatinine monitoring  12/13/2022    DTaP/Tdap/Td vaccine (3 - Td or Tdap) 09/17/2028    Colon cancer screen colonoscopy  06/11/2030    DEXA (modify frequency per FRAX score)  Completed    Flu vaccine  Completed    Shingles Vaccine  Completed    COVID-19 Vaccine  Completed    Hepatitis C screen  Completed    HIV screen  Completed    Hepatitis A vaccine  Aged Out    Hepatitis B vaccine  Aged Out    Hib vaccine  Aged Out    Meningococcal (ACWY) vaccine  Aged Out     Recommendations for [employer] Due: see orders and patient instructions/AVS.  . Recommended screening schedule for the next 5-10 years is provided to the patient in written form: see Patient Instructions/AVS.    Mayuri Quintanilla was seen today for medicare awv. Diagnoses and all orders for this visit:    Encounter for screening mammogram for breast cancer  -     BONNIE DIGITAL SCREEN W OR WO CAD BILATERAL; Future    Postmenopausal  -     DEXA BONE DENSITY 2 SITES; Future    Need for prophylactic vaccination against Streptococcus pneumoniae (pneumococcus)  -     Pneumococcal polysaccharide vaccine 23-valent PPSV23    Routine general medical examination at a health care facility    Encounter for HCV screening test for low risk patient  -     Hepatitis C Antibody; Future    Screening for human immunodeficiency virus without presence of risk factors  -     HIV Screen;  Future    Living will, counseling/discussion  -     [facility] Referral to [facility] Clinical Specialist             Claudette Finch, MD

## 2021-12-28 ENCOUNTER — CLINICAL DOCUMENTATION (OUTPATIENT)
Dept: SPIRITUAL SERVICES | Facility: CLINIC | Age: 66
End: 2021-12-28

## 2021-12-28 NOTE — ACP (ADVANCE CARE PLANNING)
Advance Care Planning   Ambulatory ACP Specialist Patient Outreach    Date:  12/28/2021  ACP Specialist:  Georgina Becker    Outreach call to patient in follow-up to ACP Specialist referral from: Harry Hernandez MD    [x] PCP  [] Provider   [] Ambulatory Care Management [] Other for Reason:    [x] Advance Directive Assistance  [] Code Status Discussion  [] Complete Portable DNR Order  [x] Discuss Goals of Care  [] Complete POST/MOST  [] Early ACP Decision-Making  [] Other    Date Referral Received: 12/22/2021    Today's Outreach:  [x] First   [] Second  [] Third                               Third outreach made by []  phone  [] email []   [email or web address]     Intervention:  [x] Spoke with Patient  [] Left VM requesting return call      Outcome:     made an initial attempt to contact Sol Moors via telephone call to offer support, if desired, for the completion of Advance Directives documents as part of an [address] conversation. * She expressed interest in the completion of documents. *  briefly explained documents for clarity and greater understanding, as well as information needed for completion. * An appointment is scheduled on 1/12 at [facility]. Next Step:   [x] ACP scheduled conversation  [] Outreach again in one week               [] Email / Mail ACP Info Sheets  [] Email / Mail Advance Directive            [] Close Referral. Routing closure to referring provider/staff and to ACP Specialist .      Thank you for this referral.

## 2022-01-12 ENCOUNTER — CLINICAL DOCUMENTATION (OUTPATIENT)
Dept: SPIRITUAL SERVICES | Facility: CLINIC | Age: 67
End: 2022-01-12

## 2022-01-12 NOTE — ACP (ADVANCE CARE PLANNING)
Advance Care Planning   Advance Care Planning Note  Ambulatory Spiritual Care Services    Date:  1/12/2022    Received request from [facility] Provider. Consultation conversation participants:   Patient who understands ACP conversation     Goals of ACP Conversation:  Discuss advance care planning documents  Facilitate a discussion related to patient's goals of care as they align with the patient's values and beliefs. Health Care Decision Makers:      Primary Decision Maker: Mihcael Henson - Child - [phone]    Secondary Decision Maker: Emily Echevarria Child - [phone]     Summary:  Completed New Documents  Updated Healthcare Decision Maker    Advance Care Planning Documents (Patient Wishes)  Currently on file:   Healthcare Power of /Advance Directive Appointment of [address]  Living Will/Advance Directive    Assessment:    met with Alanis Munoz today for a scheduled appointment to provide support for the completion of Advance Directives documents as part of an [address] conversation. * She was alert and oriented with decision-making capacity to express her wishes at this time. Interventions:  Provided education on documents for clarity and greater understanding  Assisted in the completion of documents according to patient's wishes at this time  Encouraged ongoing ACP conversation with future decision makers and loved ones    Care Preferences Communicated:  Ventilation:   If the patient, in their present state of health, suddenly became very ill and unable to breathe on their own,     the patient would desire the use of a ventilator (breathing machine). If their health worsens and it becomes clear that the change of recovery is unlikely,     Patient was unsure of her choice at this time.     Resuscitation:  In the event the patient's heart stopped as a result of an underlying serious health condition, the patient communicates a preference for      resuscitative attempts (CPR). Outcomes:  ACP Discussion: Completed  New advance directive completed. Returned original document(s) to patient, as well as copies for distribution to appointed agents  Copy of advance directive given to staff to scan into medical record. Routed ACP note to attending provider or other IDT member.     Electronically signed by Chaplain Vincent on 1/12/2022 at 1:30 PM.

## 2022-02-02 ENCOUNTER — HOSPITAL ENCOUNTER (OUTPATIENT)
Dept: WOMENS IMAGING | Age: 67
Discharge: HOME | End: 2022-02-02
Payer: MEDICARE

## 2022-02-02 ENCOUNTER — APPOINTMENT (OUTPATIENT)
Dept: WOMENS IMAGING | Age: 67
End: 2022-02-02
Payer: MEDICARE

## 2022-02-02 DIAGNOSIS — Z12.31 ENCOUNTER FOR SCREENING MAMMOGRAM FOR BREAST CANCER: ICD-10-CM

## 2022-02-02 PROCEDURE — 77063 BREAST TOMOSYNTHESIS BI: CPT

## 2022-03-16 DIAGNOSIS — E88.81 METABOLIC SYNDROME: ICD-10-CM

## 2022-03-16 RX ORDER — OMEPRAZOLE 40 MG/1
CAPSULE, DELAYED RELEASE ORAL
Qty: 90 CAPSULE | Refills: 1 | Status: SHIPPED | OUTPATIENT
Start: 2022-03-16 | End: 2022-09-27

## 2022-03-16 RX ORDER — METFORMIN HYDROCHLORIDE 500 MG/1
TABLET, EXTENDED RELEASE ORAL
Qty: 360 TABLET | Refills: 1 | Status: SHIPPED | OUTPATIENT
Start: 2022-03-16 | End: 2022-09-27

## 2022-03-16 RX ORDER — TOPIRAMATE 50 MG/1
TABLET, FILM COATED ORAL
Qty: 270 TABLET | Refills: 1 | Status: SHIPPED | OUTPATIENT
Start: 2022-03-16 | End: 2022-03-16

## 2022-03-16 RX ORDER — TOPIRAMATE 50 MG/1
TABLET, FILM COATED ORAL
Qty: 270 TABLET | Refills: 1 | Status: SHIPPED | OUTPATIENT
Start: 2022-03-16

## 2022-03-16 RX ORDER — METFORMIN HYDROCHLORIDE 500 MG/1
TABLET, EXTENDED RELEASE ORAL
Qty: 360 TABLET | Refills: 1 | Status: SHIPPED | OUTPATIENT
Start: 2022-03-16 | End: 2022-03-16

## 2022-03-18 DIAGNOSIS — I10 ESSENTIAL HYPERTENSION: ICD-10-CM

## 2022-03-18 DIAGNOSIS — E88.81 METABOLIC SYNDROME: ICD-10-CM

## 2022-03-22 RX ORDER — ATENOLOL 50 MG/1
TABLET ORAL
Qty: 135 TABLET | Refills: 1 | Status: SHIPPED | OUTPATIENT
Start: 2022-03-22 | End: 2022-09-29 | Stop reason: SDUPTHER

## 2022-03-29 RX ORDER — VENLAFAXINE HYDROCHLORIDE 150 MG/1
CAPSULE, EXTENDED RELEASE ORAL
Qty: 180 CAPSULE | Refills: 1 | Status: SHIPPED | OUTPATIENT
Start: 2022-03-29 | End: 2022-11-04

## 2022-04-18 ENCOUNTER — OFFICE VISIT (OUTPATIENT)
Dept: FAMILY MEDICINE CLINIC | Age: 67
End: 2022-04-18
Payer: MEDICARE

## 2022-04-18 VITALS
DIASTOLIC BLOOD PRESSURE: 67 MMHG | SYSTOLIC BLOOD PRESSURE: 120 MMHG | BODY MASS INDEX: 27.19 KG/M2 | HEIGHT: 68 IN | WEIGHT: 179.4 LBS | HEART RATE: 80 BPM

## 2022-04-18 DIAGNOSIS — R51.9 RIGHT-SIDED HEADACHE: ICD-10-CM

## 2022-04-18 DIAGNOSIS — M54.2 NECK PAIN ON RIGHT SIDE: Primary | ICD-10-CM

## 2022-04-18 DIAGNOSIS — R51.9 HEADACHE IN BACK OF HEAD: ICD-10-CM

## 2022-04-18 PROCEDURE — 1090F PRES/ABSN URINE INCON ASSESS: CPT | Performed by: FAMILY MEDICINE

## 2022-04-18 PROCEDURE — 1123F ACP DISCUSS/DSCN MKR DOCD: CPT | Performed by: FAMILY MEDICINE

## 2022-04-18 PROCEDURE — 3017F COLORECTAL CA SCREEN DOC REV: CPT | Performed by: FAMILY MEDICINE

## 2022-04-18 PROCEDURE — G8399 PT W/DXA RESULTS DOCUMENT: HCPCS | Performed by: FAMILY MEDICINE

## 2022-04-18 PROCEDURE — 99213 OFFICE O/P EST LOW 20 MIN: CPT | Performed by: FAMILY MEDICINE

## 2022-04-18 PROCEDURE — 1036F TOBACCO NON-USER: CPT | Performed by: FAMILY MEDICINE

## 2022-04-18 PROCEDURE — 4040F PNEUMOC VAC/ADMIN/RCVD: CPT | Performed by: FAMILY MEDICINE

## 2022-04-18 PROCEDURE — G8427 DOCREV CUR MEDS BY ELIG CLIN: HCPCS | Performed by: FAMILY MEDICINE

## 2022-04-18 PROCEDURE — G8417 CALC BMI ABV UP PARAM F/U: HCPCS | Performed by: FAMILY MEDICINE

## 2022-04-18 RX ORDER — BACLOFEN 10 MG/1
10 TABLET ORAL 3 TIMES DAILY
Qty: 60 TABLET | Refills: 0 | Status: SHIPPED | OUTPATIENT
Start: 2022-04-18

## 2022-04-18 SDOH — ECONOMIC STABILITY: FOOD INSECURITY: WITHIN THE PAST 12 MONTHS, YOU WORRIED THAT YOUR FOOD WOULD RUN OUT BEFORE YOU GOT MONEY TO BUY MORE.: NEVER TRUE

## 2022-04-18 SDOH — ECONOMIC STABILITY: FOOD INSECURITY: WITHIN THE PAST 12 MONTHS, THE FOOD YOU BOUGHT JUST DIDN'T LAST AND YOU DIDN'T HAVE MONEY TO GET MORE.: NEVER TRUE

## 2022-04-18 ASSESSMENT — SOCIAL DETERMINANTS OF HEALTH (SDOH): HOW HARD IS IT FOR YOU TO PAY FOR THE VERY BASICS LIKE FOOD, HOUSING, MEDICAL CARE, AND HEATING?: NOT HARD AT ALL

## 2022-04-18 NOTE — PROGRESS NOTES
[address]  [address] [place], [address]  Ph:   [phone]  Fax: [phone]    Provider:  Dr. Loco Barahona    Patient:  Jason Ochoa  YOB: 1955      Visit Date:  2022     Reason For Visit:   Chief Complaint   Patient presents with    Headache     started 3-4 weeks ago; HA starts at base of neck to eye. mostly right sided     Tingling     left side; shoulder to hand         Amanda Jay is a 73 y.o. female who comes today to the office because 3-4 weeks of daily headaches. She has hx of headaches and migraines in the past, but for the last 3-4 weeks they has been daily. The start about mid morning and by night they are severe. they are located on the right side. It feels like a holly that comes from the right ashley of the  back of the head and goes to the right eye. It is 6-8/10. It is continuous (mildly) with sharp, shooting episodes during the day. Its usually gone when she wakes up in the morning. It is associates to phonophobia, but not photophobia. No rhinorrhea or tearing. Another complain today is tingling of the left shoulder arm and hand  Worse in the 4th and 5 th digits. Hx of cervical disc disease and previous surgery    Significant Past Medical History:    Past Medical History:   Diagnosis Date    Asthma     Dr. Husain Speak Depression       from [facility] neck    Fibromyalgia    Aaron [place] Hyperlipemia 2015    check per Dr Martha Reardon Insulin resistance 2011    Narcolepsy 2000    per Dr. Perry Julio via sleep study    Neck pain     cervical stenosis    PLMD (periodic limb movement disorder)     Prolonged emergence from general anesthesia 2000    Tachycardia            Allergies:  is allergic to bactrim, iv dye [iodides], tolectin [tolmetin sodium], claforan [cefotaxime sodium in d5w], and pcn [penicillins].     Medications:   Current Outpatient Medications   Medication Sig Dispense Refill    baclofen (LIORESAL) 10 MG tablet Take 1 tablet by mouth 3 times daily 60 tablet 0    venlafaxine (EFFEXOR XR) 150 MG extended release capsule TAKE 1 CAPSULE BY MOUTH 2 TIMES DAILY 180 capsule 1    atenolol (TENORMIN) 50 MG tablet TAKE 1 AND 1/2 TABLETS BY MOUTH ONCE DAILY. 135 tablet 1    omeprazole (PRILOSEC) 40 MG delayed release capsule TAKE 1 CAPSULE BY MOUTH IN THE MORNING BEFORE BREAKFAST. 90 capsule 1    metFORMIN (GLUCOPHAGE-XR) 500 MG extended release tablet TAKE 4 TABLETS BY MOUTH IN THE EVENING 360 tablet 1    topiramate (TOPAMAX) 50 MG tablet TAKE 1 TABLET BY MOUTH IN THE MORNING AND TAKE 2 TABLETS BY MOUTH IN THE AFTERNOON 270 tablet 1    mometasone-formoterol (DULERA) 200-5 MCG/ACT inhaler Inhale 2 puffs into the lungs 2 times daily 13 g 11    levalbuterol (XOPENEX HFA) 45 MCG/ACT inhaler Inhale 2 puffs into the lungs every 6 hours as needed for Wheezing or Shortness of Breath 15 g 11    rizatriptan (MAXALT) 10 MG tablet Take 1 tablet by mouth once as needed for Migraine May repeat in 2 hours if needed 36 tablet 1    atorvastatin (LIPITOR) 80 MG tablet Take 1 tablet by mouth daily 90 tablet 1    losartan (COZAAR) 25 MG tablet Take 1 tablet by mouth nightly 90 tablet 1    ARIPiprazole (ABILIFY) 5 MG tablet TAKE 1 TABLET BY MOUTH ONCE DAILY 90 tablet 1    tiZANidine (ZANAFLEX) 4 MG tablet TAKE 1 TABLET BY MOUTH NIGHTLY AT BEDTIME AS NEEDED 90 tablet 1    montelukast (SINGULAIR) 10 MG tablet Take 1 tablet by mouth nightly 30 tablet 11    fluticasone (FLONASE) 50 MCG/ACT nasal spray 1 spray by Nasal route daily 3 Bottle 1    nitroGLYCERIN (NITROSTAT) 0.4 MG SL tablet Place 1 tablet under the tongue every 5 minutes as needed for Chest pain. 25 tablet 3    Multiple Vitamins-Minerals (THERAPEUTIC MULTIVITAMIN-MINERALS) tablet Take 1 tablet by mouth daily.  Coenzyme Q10 (CO Q 10) 10 MG CAPS Take 1 capsule by mouth daily as needed       Calcium Carbonate-Vitamin D (CALCIUM + D PO) Take 1,200 mg by mouth daily.         LYSINE Take 1,000 mg by mouth 2 times daily as needed        No current facility-administered medications for this visit. Review of systems:  Review of Systems - History obtained from chart review and the patient  General ROS: negative for - chills, fatigue or fever  ENT ROS:positive for - headaches. Some nasal congestion   Allergy and Immunology ROS: positive for - seasonal allergies  Hematological and Lymphatic ROS: negative for - bruising  Respiratory ROS: negative for - cough,  shortness of breath or wheezing  Cardiovascular ROS: negative for - chest pain or edema  Gastrointestinal ROS: negative for - abdominal pain, constipation, diarrhea or nausea/vomiting  Musculoskeletal ROS: negative for - joint pain or muscle pain  Neurological ROS: negative for - dizziness  Dermatological ROS: negative for - rash    Physical Examination:  /67   Pulse 80   Ht 5' 8\" (1.727 m)   Wt 179 lb 6.4 oz (81.4 kg)   BMI 27.28 kg/m²     General-  Alert and oriented x 3, NAD  HEENT: NC, AT, PERRLA, EOMI, anicteric sclerae  Ears: Normal tympanic membranes bilaterally  Nose: patent, no lesions  Mouth: no lesions, moist mucosas  Neck - supple, no significant adenopathy  Chest - clear to auscultation, no wheezes, rales or rhonchi, symmetric air entry  Heart - normal rate, regular rhythm, normal S1, S2, no murmurs, rubs, clicks or gallops  Abdomen - soft, nontender, nondistended, no masses or organomegaly  Extremities -no pedal edema, no clubbing or cyanosis  Neck: limited range of motion due to previous surgery    Impression:   Diagnosis Orders   1. Neck pain on right side  [facility] , Pain Medicine, [address] [place] [address]    MRI CERVICAL SPINE WO CONTRAST    MRI BRAIN W WO CONTRAST   2. Right-sided headache  [facility] [place], Pain Medicine, [address] [place] [address]    MRI CERVICAL SPINE WO CONTRAST   3. Headache in back of head  [facility], [place] , Pain Medicine, [facility]:   This

## 2022-04-22 DIAGNOSIS — J44.9 ASTHMA-COPD OVERLAP SYNDROME (HCC): ICD-10-CM

## 2022-04-28 NOTE — TELEPHONE ENCOUNTER
Prescription request from 4/22. Patient is now out. And requesting the refill to be expedited.   Please approve or deny     Last Visit Date:  4/18/2022       Next Visit Date:    Visit date not found

## 2022-05-02 ENCOUNTER — OFFICE VISIT (OUTPATIENT)
Dept: PAIN MANAGEMENT | Age: 67
End: 2022-05-02
Payer: MEDICARE

## 2022-05-02 VITALS
BODY MASS INDEX: 26.76 KG/M2 | DIASTOLIC BLOOD PRESSURE: 86 MMHG | SYSTOLIC BLOOD PRESSURE: 124 MMHG | WEIGHT: 176.6 LBS | HEIGHT: 68 IN

## 2022-05-02 DIAGNOSIS — M79.2 NEUROPATHIC PAIN: ICD-10-CM

## 2022-05-02 DIAGNOSIS — M54.81 OCCIPITAL NEURALGIA OF RIGHT SIDE: Primary | ICD-10-CM

## 2022-05-02 DIAGNOSIS — M79.18 MYOFASCIAL PAIN: ICD-10-CM

## 2022-05-02 DIAGNOSIS — G89.29 OTHER CHRONIC PAIN: ICD-10-CM

## 2022-05-02 DIAGNOSIS — Z98.1 S/P CERVICAL SPINAL FUSION: ICD-10-CM

## 2022-05-02 DIAGNOSIS — J44.9 ASTHMA-COPD OVERLAP SYNDROME (HCC): ICD-10-CM

## 2022-05-02 PROCEDURE — 3017F COLORECTAL CA SCREEN DOC REV: CPT | Performed by: ANESTHESIOLOGY

## 2022-05-02 PROCEDURE — 99204 OFFICE O/P NEW MOD 45 MIN: CPT | Performed by: ANESTHESIOLOGY

## 2022-05-02 PROCEDURE — 1036F TOBACCO NON-USER: CPT | Performed by: ANESTHESIOLOGY

## 2022-05-02 PROCEDURE — 4040F PNEUMOC VAC/ADMIN/RCVD: CPT | Performed by: ANESTHESIOLOGY

## 2022-05-02 PROCEDURE — G8399 PT W/DXA RESULTS DOCUMENT: HCPCS | Performed by: ANESTHESIOLOGY

## 2022-05-02 PROCEDURE — G8417 CALC BMI ABV UP PARAM F/U: HCPCS | Performed by: ANESTHESIOLOGY

## 2022-05-02 PROCEDURE — 1090F PRES/ABSN URINE INCON ASSESS: CPT | Performed by: ANESTHESIOLOGY

## 2022-05-02 PROCEDURE — 64405 NJX AA&/STRD GR OCPL NRV: CPT | Performed by: ANESTHESIOLOGY

## 2022-05-02 PROCEDURE — G8427 DOCREV CUR MEDS BY ELIG CLIN: HCPCS | Performed by: ANESTHESIOLOGY

## 2022-05-02 PROCEDURE — 1123F ACP DISCUSS/DSCN MKR DOCD: CPT | Performed by: ANESTHESIOLOGY

## 2022-05-02 RX ORDER — TRIAMCINOLONE ACETONIDE 40 MG/ML
40 INJECTION, SUSPENSION INTRA-ARTICULAR; INTRAMUSCULAR ONCE
Status: COMPLETED | OUTPATIENT
Start: 2022-05-02 | End: 2022-05-03

## 2022-05-02 NOTE — TELEPHONE ENCOUNTER
Lauren Love called requesting a refill on the following medications:  Requested Prescriptions     Pending Prescriptions Disp Refills    mometasone-formoterol (DULERA) 200-5 MCG/ACT inhaler 13 g 11     Sig: Inhale 2 puffs into the lungs 2 times daily     Pharmacy verified:  .pv  [address]    Date of last visit: 10/19/2021  Date of next visit (if applicable): 5/57/6852

## 2022-05-02 NOTE — PROGRESS NOTES
Chronic Pain/PM&R Clinic Note     Encounter Date: 22    Subjective:   Chief Complaint:   Chief Complaint   Patient presents with    New Patient     rt. side neck, head pain       History of Present Illness:   Lauren Love is a 73 y.o. female seen in the clinic initially on 22 upon request from Tamy Shea MD (PCP) for her history of chronic neck pain and migraines. She has a medical history of 2 previous cervical neck surgeries including a C3/C4 and C7/T1 anterior cervical disc fusions. She has been dealing with migraines ever since she was a teenager. She states that she has been noticing worsening pain on the right side of her neck over the last few months without any inciting event. She states this pain is a constant 5/10 shooting, throbbing, stabbing pain. She states the pain will start at the base of her occiput on the right-hand side and radiate around to the top of her scalp around to her right eye and to her ear. She states that this can be worse with mastication but denies any associated blurry vision, double vision, ringing in the ears, or issues with photophobia or phonophobia. She states that she does have some very intermittent numbness/tingling in her left hand. She denies any worsening balance/gait disturbances. She states that she has been managing her pain with the use of Excedrin, Maxalt, Topamax, baclofen. She states she was close to having Botox done for her migraines but this was never pursued when she had to have her second surgery.       History of Interventions:   Surgery: Previous neck surgeries (C3/C4 and C7/T1 ACDF)  Injections: None    Imaging/Studies:  MRI C-spine         Past Medical History:   Diagnosis Date    Asthma     Dr. Malcom Cat Depression 2000      [facility]    Fibromyalgia    Deangelo Hyperlipemia 2015    check per Dr Beatris Morales Insulin resistance 2011    Narcolepsy     per Dr. Sudhakar Saleh via sleep study    Neck pain  cervical stenosis    PLMD (periodic limb movement disorder)     Prolonged emergence from general anesthesia 2000    Tachycardia 2001       Past Surgical History:   Procedure Laterality Date    ANKLE SURGERY Left 05/02/2017    OIO    ANKLE SURGERY Left 12/09/2020    Replacement and tendon repair    CARPAL TUNNEL RELEASE Right 2011    CERVICAL DISCECTOMY  2012    C3-4, C4-5    CERVICAL FUSION N/A 12/18/2019    REMOVAL OF PLATE C1-0, ACDF C6-8, C6-7 WITH PLATING/MEDTRONIC performed by Masha Kitchen MD at [address]  2006    CORONARY ANGIOPLASTY WITH STENT PLACEMENT  2/12/15    Dr. Bebeto Amezquita  2015   [address] Right 2011    bone removal from foot on rt    FOOT SURGERY Left 2011    bone spur, tarsal tunnel and cyst removal on left    HYSTERECTOMY  1996    total, endometriosis    JOINT REPLACEMENT  2009    bilateral knee    LAPAROSCOPY  2010    for endometriosis    OVARY REMOVAL  1996    TONSILLECTOMY AND ADENOIDECTOMY  2006    UPPER GASTROINTESTINAL ENDOSCOPY  2006       Family History   Problem Relation Age of Onset    COPD Father     Heart Disease Father 61        ekg changes    Cancer Sister 50        Multiple malaran     Irritable Bowel Syndrome Sister     Colon Cancer Sister 36        diagnosed age 42's    Bright Arthritis Mother 48    COPD Maternal Aunt     Severe Sprains Maternal Uncle         epilepsy     Cancer Paternal Uncle 58        lung    Diabetes Maternal Grandmother     Kidney Disease Maternal Grandmother 72        Failure form DM     Cancer Maternal Grandfather         Mets all over     Coronary Art Dis Paternal Grandfather 35    Heart Disease Paternal Grandfather 35    Heart Attack Paternal Grandfather 35    Cancer Maternal Uncle         Lung     Stroke Maternal Uncle     Diabetes Maternal Uncle         NIDDM     Cancer Maternal Cousin         lung Cancer     Cancer Daughter         Skin Cancer          Medications & Allergies: Current Outpatient Medications   Medication Instructions    ARIPiprazole (ABILIFY) 5 MG tablet TAKE 1 TABLET BY MOUTH ONCE DAILY    atenolol (TENORMIN) 50 MG tablet TAKE 1 AND 1/2 TABLETS BY MOUTH ONCE DAILY.  atorvastatin (LIPITOR) 80 mg, Oral, DAILY    baclofen (LIORESAL) 10 mg, Oral, 3 TIMES DAILY    Calcium Carbonate-Vitamin D (CALCIUM + D PO) 1,200 mg, DAILY    Coenzyme Q10 (CO Q 10) 10 MG CAPS 1 capsule, Oral, DAILY PRN    fluticasone (FLONASE) 50 MCG/ACT nasal spray 1 spray, Nasal, DAILY    levalbuterol (XOPENEX HFA) 45 MCG/ACT inhaler 2 puffs, Inhalation, EVERY 6 HOURS PRN    losartan (COZAAR) 25 mg, Oral, NIGHTLY    LYSINE 1,000 mg, Oral, 2 TIMES DAILY PRN    metFORMIN (GLUCOPHAGE-XR) 500 MG extended release tablet TAKE 4 TABLETS BY MOUTH IN THE EVENING    mometasone-formoterol (DULERA) 200-5 MCG/ACT inhaler 2 puffs, Inhalation, 2 TIMES DAILY    montelukast (SINGULAIR) 10 mg, Oral, NIGHTLY    Multiple Vitamins-Minerals (THERAPEUTIC MULTIVITAMIN-MINERALS) tablet 1 tablet, DAILY    nitroGLYCERIN (NITROSTAT) 0.4 mg, SubLINGual, EVERY 5 MIN PRN    omeprazole (PRILOSEC) 40 MG delayed release capsule TAKE 1 CAPSULE BY MOUTH IN THE MORNING BEFORE BREAKFAST.     rizatriptan (MAXALT) 10 mg, Oral, ONCE PRN, May repeat in 2 hours if needed    tiZANidine (ZANAFLEX) 4 MG tablet TAKE 1 TABLET BY MOUTH NIGHTLY AT BEDTIME AS NEEDED    topiramate (TOPAMAX) 50 MG tablet TAKE 1 TABLET BY MOUTH IN THE MORNING AND TAKE 2 TABLETS BY MOUTH IN THE AFTERNOON    venlafaxine (EFFEXOR XR) 150 MG extended release capsule TAKE 1 CAPSULE BY MOUTH 2 TIMES DAILY       Allergies   Allergen Reactions    Bactrim Other (See Comments)     Metallic taste    Iv Dye [Iodides] Nausea Only and Other (See Comments)     Says older IVP dye causes Stomach pains    Tolectin [Tolmetin Sodium] Other (See Comments)     Metallic taste    Claforan [Cefotaxime Sodium In D5w] Hives and Rash    Pcn [Penicillins] Hives and Rash Review of Systems:   Constitutional: negative for weight changes or fevers  Genitourinary: negative for bowel/bladder incontinence   Musculoskeletal: positive for neck pain  Neurological: positive for migraines but negative for any arm weakness  Behavioral/Psych: negative for anxiety/depression   All other systems reviewed and are negative    Objective:     Vitals:    05/02/22 0910   BP: 124/86       Constitutional: Pleasant, no acute distress   Head: Normocephalic, atraumatic   Eyes: Conjunctivae normal   Neck: Supple, symmetrical   Respiration: Non-labored breathing   Cardiovascular: Limbs warm and well perfused   Musculoskeletal: Reduced cervical ROM in all planes. Negative Spurling maneuver bilaterally. Increased pain with facet loading on RIGHT. Tenderness to palpation in RIGHT cervical paraspinals. Positive tinel's over occipital nerve on right. Neuro: Alert, oriented. CN II-XII appear grossly intact. Motor strength 5/5 SAb, EF, EE, WE, Ayush. LT sensation diminished over left digit one. Biceps/triceps reflexes 2+ and symmetrical. Negative Bay bilaterally. Skin: healed anterior neck incision scar  Psychological: Cooperative, no exaggerated pain behaviors         Assessment:    Diagnosis Orders   1. Occipital neuralgia of right side     2. Neuropathic pain     3. Myofascial pain     4. Other chronic pain     5. S/P cervical spinal fusion           Juana Callahan is a 73 y. o.female presenting to the pain clinic for evaluation of chronic right-sided neck pain and migraines. Her clinical history and physical examination are consistent with occipital neuralgia in both the greater and lesser occipital nerve distribution on the right. She underwent a right occipital nerve block in clinic. We will follow-up in 6 weeks. We may potentially pursue Botox injections versus right C2/C3 (third occipital nerve) block if she fails to respond. Plan:    The following treatment recommendations and plan were discussed in detail with Avril Yu. Imaging/Studies/Labs:   I have reviewed patients imaging of MRI C-spine and results were discussed with patient today. Analgesics:   Patient is taking Excedrin as needed. Patient is advised to take as prescribed and not take on an empty stomach. Adjuvants: In light of the presence of a neuropathic component of pain, patient can continue Topamax twice daily. Interventions: With examination consistent with occipital neuralgia, patient underwent right occipital nerve block in clinic. Please see attached procedure note. Anticoagulation/NPO Recommendations:   None    Multidisciplinary Pain Management:   In the presence of complex, chronic, and multi-factorial pain, the importance of a multidisciplinary approach to pain management in the patients management regimen was emphasized and discussed in great detail.      Referrals:  None    Prescriptions Written This Visit:   None    Follow-up: 6 weeks      Floyd Mckenzie DO  Interventional Pain Management/PM&R   [place]

## 2022-05-02 NOTE — PROGRESS NOTES
Pre-operative Diagnosis:  RIGHT occipital neuralgia      Post-operative Diagnosis: RIGHT occipital neuralgia      Procedure: RIGHT occipital nerve block     Procedure Description:  After having signed the informed consent, the patient was seated in a chair. The patient's posterior occiput region was prepped with alcohol wipes. The occipital protuberance and mastoid processes were palpated. Moving inferior lateral one third this distance from the occipital protuberance to the right, the right occipital nerve was blocked using a 25-gauge 1.5 inch needle inserted directly through skin to lie over the bone medial to the occipital artery. After negative aspiration, 40 mg of kenalog mixed with 4 cc of 0.5% bupivacaine were injected. The needle was removed without any complications. The patient tolerated the procedure well.      Procedural Complications: None        Frankie Ruano DO  Interventional Pain Management/PM&R   [place]

## 2022-05-03 RX ADMIN — TRIAMCINOLONE ACETONIDE 40 MG: 40 INJECTION, SUSPENSION INTRA-ARTICULAR; INTRAMUSCULAR at 11:43

## 2022-05-05 ENCOUNTER — HOSPITAL ENCOUNTER (OUTPATIENT)
Dept: MRI IMAGING | Age: 67
Discharge: HOME | End: 2022-05-05
Payer: MEDICARE

## 2022-05-05 ENCOUNTER — TELEPHONE (OUTPATIENT)
Dept: FAMILY MEDICINE CLINIC | Age: 67
End: 2022-05-05

## 2022-05-05 ENCOUNTER — NURSE ONLY (OUTPATIENT)
Dept: LAB | Age: 67
End: 2022-05-05

## 2022-05-05 DIAGNOSIS — M54.2 NECK PAIN ON RIGHT SIDE: ICD-10-CM

## 2022-05-05 DIAGNOSIS — R51.9 RIGHT-SIDED HEADACHE: ICD-10-CM

## 2022-05-05 DIAGNOSIS — Z01.818 ENCOUNTER FOR PREADMISSION TESTING: Primary | ICD-10-CM

## 2022-05-05 DIAGNOSIS — R51.9 HEADACHE IN BACK OF HEAD: ICD-10-CM

## 2022-05-05 DIAGNOSIS — Z01.818 ENCOUNTER FOR PREADMISSION TESTING: ICD-10-CM

## 2022-05-05 LAB
CREAT SERPL-MCNC: 0.9 MG/DL (ref 0.4–1.2)
GFR SERPL CREATININE-BSD FRML MDRD: 63 ML/MIN/1.73M2

## 2022-05-05 PROCEDURE — 6360000004 HC RX CONTRAST MEDICATION: Performed by: FAMILY MEDICINE

## 2022-05-05 PROCEDURE — A9579 GAD-BASE MR CONTRAST NOS,1ML: HCPCS | Performed by: FAMILY MEDICINE

## 2022-05-05 PROCEDURE — 72141 MRI NECK SPINE W/O DYE: CPT

## 2022-05-05 PROCEDURE — 70553 MRI BRAIN STEM W/O & W/DYE: CPT

## 2022-05-05 RX ADMIN — GADOTERIDOL 15 ML: 279.3 INJECTION, SOLUTION INTRAVENOUS at 19:41

## 2022-05-05 NOTE — TELEPHONE ENCOUNTER
[facility] lab called requesting to have an order for a creatinine prior to radiology today. Order placed.

## 2022-05-13 ENCOUNTER — TELEPHONE (OUTPATIENT)
Dept: FAMILY MEDICINE CLINIC | Age: 67
End: 2022-05-13

## 2022-05-13 NOTE — TELEPHONE ENCOUNTER
Ciara Dior MD   5/12/2022  4:05 PM EDT Back to Top        MRI cervical spine showed  1. No acute findings in the cervical spine. No evidence of spinal canal stenosis. 2. Foraminal stenosis at a few levels      MRI brain:  1. 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 2. Minimal severity chronic small vessel ischemic changes.   3. No acute findings     Continue Lipitor 80 mg daily, control BP and blood sugars

## 2022-05-16 NOTE — TELEPHONE ENCOUNTER
Patient called and notified of results. Patient states understanding. No further questions or concerns at this time.

## 2022-05-27 RX ORDER — LOSARTAN POTASSIUM 25 MG/1
TABLET ORAL
Qty: 90 TABLET | Refills: 2 | OUTPATIENT
Start: 2022-05-27

## 2022-06-13 ENCOUNTER — TELEPHONE (OUTPATIENT)
Dept: PHYSICAL MEDICINE AND REHAB | Age: 67
End: 2022-06-13

## 2022-06-13 ENCOUNTER — OFFICE VISIT (OUTPATIENT)
Dept: PHYSICAL MEDICINE AND REHAB | Age: 67
End: 2022-06-13
Payer: MEDICARE

## 2022-06-13 VITALS
DIASTOLIC BLOOD PRESSURE: 86 MMHG | BODY MASS INDEX: 26.67 KG/M2 | SYSTOLIC BLOOD PRESSURE: 118 MMHG | HEIGHT: 68 IN | WEIGHT: 176 LBS

## 2022-06-13 DIAGNOSIS — M47.812 CERVICAL SPONDYLOSIS: Primary | ICD-10-CM

## 2022-06-13 DIAGNOSIS — G89.29 OTHER CHRONIC PAIN: ICD-10-CM

## 2022-06-13 DIAGNOSIS — M79.2 NEUROPATHIC PAIN: ICD-10-CM

## 2022-06-13 DIAGNOSIS — M54.81 OCCIPITAL NEURALGIA OF RIGHT SIDE: ICD-10-CM

## 2022-06-13 PROCEDURE — G8417 CALC BMI ABV UP PARAM F/U: HCPCS | Performed by: ANESTHESIOLOGY

## 2022-06-13 PROCEDURE — 1036F TOBACCO NON-USER: CPT | Performed by: ANESTHESIOLOGY

## 2022-06-13 PROCEDURE — 1123F ACP DISCUSS/DSCN MKR DOCD: CPT | Performed by: ANESTHESIOLOGY

## 2022-06-13 PROCEDURE — 99214 OFFICE O/P EST MOD 30 MIN: CPT | Performed by: ANESTHESIOLOGY

## 2022-06-13 PROCEDURE — 1090F PRES/ABSN URINE INCON ASSESS: CPT | Performed by: ANESTHESIOLOGY

## 2022-06-13 PROCEDURE — 3017F COLORECTAL CA SCREEN DOC REV: CPT | Performed by: ANESTHESIOLOGY

## 2022-06-13 PROCEDURE — G8399 PT W/DXA RESULTS DOCUMENT: HCPCS | Performed by: ANESTHESIOLOGY

## 2022-06-13 PROCEDURE — G8427 DOCREV CUR MEDS BY ELIG CLIN: HCPCS | Performed by: ANESTHESIOLOGY

## 2022-06-13 NOTE — PROGRESS NOTES
Chronic Pain/PM&R Clinic Note     Encounter Date: 6/13/22    Subjective:   Chief Complaint:   Chief Complaint   Patient presents with    Follow-up     discuss headaches        History of Present Illness:   Leighton Coles is a 73 y.o. female seen in the clinic initially on 05/02/22 upon request from Matilde Montanez MD (PCP) for her history of chronic neck pain and migraines. She has a medical history of 2 previous cervical neck surgeries including a C3/C4 and C7/T1 anterior cervical disc fusions. She has been dealing with migraines ever since she was a teenager. She states that she has been noticing worsening pain on the right side of her neck over the last few months without any inciting event. She states this pain is a constant 5/10 shooting, throbbing, stabbing pain. She states the pain will start at the base of her occiput on the right-hand side and radiate around to the top of her scalp around to her right eye and to her ear. She states that this can be worse with mastication but denies any associated blurry vision, double vision, ringing in the ears, or issues with photophobia or phonophobia. She states that she does have some very intermittent numbness/tingling in her left hand. She denies any worsening balance/gait disturbances. She states that she has been managing her pain with the use of Excedrin, Maxalt, Topamax, baclofen. She states she was close to having Botox done for her migraines but this was never pursued when she had to have her second surgery. Today, 6/13/2022, patient presents for planned follow-up for chronic neck pain and migraines. She underwent a right occipital nerve block on 5/2/2022. She reports about 50-60% relief lasting about 6 weeks in duration from this procedure. She states that this has since worn off. She is happy she has obtained relief but she would like to try something I can give her a more robust response and longer lasting relief.   She describes her pain to be as previously described without any changes. She denies any other new symptoms this point.       History of Interventions:   Surgery: Previous neck surgeries (C3/C4 and C7/T1 ACDF)  Injections: Right occipital nerve block (22) - 50-60% relief x 6 weeks    Imaging/Studies:  MRI C-spine         Past Medical History:   Diagnosis Date    Asthma     Dr. Duana Fothergill Depression 2000      from [facility] neck    Fibromyalgia    Bright Hyperlipemia     check per Dr Mustapha Hughes Insulin resistance     Narcolepsy     per Dr. Nella Romero via sleep study    Neck pain     cervical stenosis    PLMD (periodic limb movement disorder)     Prolonged emergence from general anesthesia     Tachycardia        Past Surgical History:   Procedure Laterality Date    ANKLE SURGERY Left 2017    OIO    ANKLE SURGERY Left 2020    Replacement and tendon repair    CARPAL TUNNEL RELEASE Right 2011    CERVICAL DISCECTOMY  2012    C3-4, C4-5    CERVICAL FUSION N/A 2019    REMOVAL OF PLATE T4-6, ACDF X7-0, C6-7 WITH PLATING/MEDTRONIC performed by Max Barr MD at [place] [address]      CORONARY ANGIOPLASTY WITH STENT PLACEMENT  2/12/15    Dr. Allen Finney  2015    FOOT SURGERY Right     bone removal from foot on rt    FOOT SURGERY Left 2011    bone spur, tarsal tunnel and cyst removal on left    HYSTERECTOMY (CERVIX STATUS UNKNOWN)      total, endometriosis    JOINT REPLACEMENT      bilateral knee    LAPAROSCOPY      for endometriosis    OVARY REMOVAL  1996    TONSILLECTOMY AND ADENOIDECTOMY  2006    UPPER GASTROINTESTINAL ENDOSCOPY  2006       Family History   Problem Relation Age of Onset    COPD Father     Heart Disease Father 61        ekg changes    Cancer Sister 50        Multiple malaran     Irritable Bowel Syndrome Sister     Colon Cancer Sister 36        diagnosed age 44's     Arthritis Mother 48    COPD Maternal Aunt     Severe Sprains Maternal Uncle         epilepsy     Cancer Paternal Uncle 58        lung    Diabetes Maternal Grandmother     Kidney Disease Maternal Grandmother 72        Failure form DM     Cancer Maternal Grandfather         Mets all over     Coronary Art Dis Paternal Grandfather 35    Heart Disease Paternal Grandfather 35    Heart Attack Paternal Grandfather 35    Cancer Maternal Uncle         Lung     Stroke Maternal Uncle     Diabetes Maternal Uncle         NIDDM     Cancer Maternal Cousin         lung Cancer     Cancer Daughter         Skin Cancer          Medications & Allergies:   Current Outpatient Medications   Medication Instructions    ARIPiprazole (ABILIFY) 5 MG tablet TAKE 1 TABLET BY MOUTH ONCE DAILY    atenolol (TENORMIN) 50 MG tablet TAKE 1 AND 1/2 TABLETS BY MOUTH ONCE DAILY.  atorvastatin (LIPITOR) 80 mg, Oral, DAILY    baclofen (LIORESAL) 10 mg, Oral, 3 TIMES DAILY    Calcium Carbonate-Vitamin D (CALCIUM + D PO) 1,200 mg, DAILY    Coenzyme Q10 (CO Q 10) 10 MG CAPS 1 capsule, Oral, DAILY PRN    fluticasone (FLONASE) 50 MCG/ACT nasal spray 1 spray, Nasal, DAILY    levalbuterol (XOPENEX HFA) 45 MCG/ACT inhaler 2 puffs, Inhalation, EVERY 6 HOURS PRN    losartan (COZAAR) 25 mg, Oral, NIGHTLY    LYSINE 1,000 mg, Oral, 2 TIMES DAILY PRN    metFORMIN (GLUCOPHAGE-XR) 500 MG extended release tablet TAKE 4 TABLETS BY MOUTH IN THE EVENING    mometasone-formoterol (DULERA) 200-5 MCG/ACT inhaler 2 puffs, Inhalation, 2 TIMES DAILY    montelukast (SINGULAIR) 10 mg, Oral, NIGHTLY    Multiple Vitamins-Minerals (THERAPEUTIC MULTIVITAMIN-MINERALS) tablet 1 tablet, Oral, DAILY    nitroGLYCERIN (NITROSTAT) 0.4 mg, SubLINGual, EVERY 5 MIN PRN    omeprazole (PRILOSEC) 40 MG delayed release capsule TAKE 1 CAPSULE BY MOUTH IN THE MORNING BEFORE BREAKFAST.     rizatriptan (MAXALT) 10 mg, Oral, ONCE PRN, May repeat in 2 hours if needed    tiZANidine (ZANAFLEX) 4 MG tablet Neuropathic pain     4. Other chronic pain           eGm Park is a 73 y. o.female presenting to the pain clinic for evaluation of chronic right-sided neck pain and migraines. Her clinical history and physical examination are consistent with occipital neuralgia in both the greater and lesser occipital nerve distribution on the right. She underwent a right occipital nerve block in clinic. We will follow-up in 6 weeks. We may potentially pursue Botox injections versus right C2/C3 (third occipital nerve) block if she fails to respond. She had a good response to her occipital nerve block. I have set her up for right-sided cervical medial branch blocks targeting the facet joints of C2/C3 and C3/C4. Plan: The following treatment recommendations and plan were discussed in detail with Gem Park. Imaging/Studies/Labs:   I have reviewed patients imaging of MRI C-spine and results were discussed with patient today. Analgesics:   Patient is taking Excedrin as needed. Patient is advised to take as prescribed and not take on an empty stomach. Adjuvants: In light of the presence of a neuropathic component of pain, patient can continue Topamax twice daily. Interventions: In presence of cervical neck pain and migraines and with physical exam consistent for facetal pain, the option of medial branch blocks at RIGHT C2/C3 and C3/C4 was chosen. The risks and benefits were discussed in detail with the patient. Patient wants to proceed with the injection. Anticoagulation/NPO Recommendations:   Patient will need to hold Aspirin x 6 days prior to the procedure. Patient will need to be NPO x 8 hours prior to the procedure. Plan to start an IV prior to the procedure.     Multidisciplinary Pain Management:   In the presence of complex, chronic, and multi-factorial pain, the importance of a multidisciplinary approach to pain management in the patients management regimen was emphasized and discussed in great detail.      Referrals:  None    Prescriptions Written This Visit:   None    Follow-up: RIGHT cervical MBBs      Frankie Juárez, DO  Interventional Pain Management/PM&R   [place]

## 2022-06-13 NOTE — TELEPHONE ENCOUNTER
Med. Clearance received. Pt. Last seen 12/2/2020, no f/u appt. NS 2/21/2022. Pt. Notified. Advised to call Dr. Sonia Rae and schedule f/u appointment. Verbalized understanding.

## 2022-07-21 DIAGNOSIS — J45.30 MILD PERSISTENT ASTHMA WITHOUT COMPLICATION: ICD-10-CM

## 2022-07-22 RX ORDER — MONTELUKAST SODIUM 10 MG/1
10 TABLET ORAL NIGHTLY
Qty: 30 TABLET | Refills: 11 | Status: SHIPPED | OUTPATIENT
Start: 2022-07-22

## 2022-07-22 NOTE — TELEPHONE ENCOUNTER
Received refill request for Singulair. Medication was last ordered by Ludwin Rebolledo. Medication was last ordered on 6/1/21 with 11 refills. Patient was last seen in the office by Torres Nair 10.19.21. Patient has a scheduled follow up 10.17.22. [facility]

## 2022-07-26 ENCOUNTER — PREP FOR PROCEDURE (OUTPATIENT)
Dept: PHYSICAL MEDICINE AND REHAB | Age: 67
End: 2022-07-26

## 2022-08-01 NOTE — H&P
Today, patient presents for planned medial branch blocks at RIGHT C2/C3 and C3/C4. This note is reflective of the patient's previous visit for evaluation. We will proceed with today's planned procedure. Since patient's last visit for evaluation, there have been no interval changes in medical history. Patient has no new numbness, weakness, or focal neurological deficit since evaluation. Patient has no contraindications to injection (no anticoagulation or recent antibiotic intake for active infections), and has a  present or is able to drive themselves (as discussed and cleared by physician). Allergies to latex, contrast dye, and steroid medications have been confirmed with the patient prior to the procedure. NPO necessity has been assessed and accepted based on procedure complexity. The risks and benefits of the procedure have been explained including but are not limited to infection, bleeding, paralysis, immediate post procedure weakness, and dizziness; the patient acknowledges understanding and desires to proceed with the procedure. Patient has signed consent for same procedure as discussed in previous clinic encounter. All other questions and concerns were addressed at bedside. See procedure note for full details. Post procedure Instructions: The patient was advised not to drive during the day of the procedure and not to engage in any significant decision making (unless otherwise states by physician). The patient was also advised to be cautious with walking/activity for 24 hours following today's visit and asked not to engage in over-exertion (unless otherwise states by physician). After this time, it is ok to resume pre-procedure level of activity. Patient advised to apply ice to site of injection in situations of pain and discomfort. Patient advised to not submerge site of injection during bath or pool activities for approximately 24 hours post-procedure.  Patient attested to understanding post procedure directions / restrictions. All other questions and concerns addressed before patient discharge in ambulatory fashion. Chronic Pain/PM&R Clinic Note     Encounter Date: 6/13/22    Subjective:   Chief Complaint:   No chief complaint on file. History of Present Illness:   Yaya Tobin is a 78 y.o. female seen in the clinic initially on 05/02/22 upon request from Manda Vuong MD (PCP) for her history of chronic neck pain and migraines. She has a medical history of 2 previous cervical neck surgeries including a C3/C4 and C7/T1 anterior cervical disc fusions. She has been dealing with migraines ever since she was a teenager. She states that she has been noticing worsening pain on the right side of her neck over the last few months without any inciting event. She states this pain is a constant 5/10 shooting, throbbing, stabbing pain. She states the pain will start at the base of her occiput on the right-hand side and radiate around to the top of her scalp around to her right eye and to her ear. She states that this can be worse with mastication but denies any associated blurry vision, double vision, ringing in the ears, or issues with photophobia or phonophobia. She states that she does have some very intermittent numbness/tingling in her left hand. She denies any worsening balance/gait disturbances. She states that she has been managing her pain with the use of Excedrin, Maxalt, Topamax, baclofen. She states she was close to having Botox done for her migraines but this was never pursued when she had to have her second surgery. Today, 6/13/2022, patient presents for planned follow-up for chronic neck pain and migraines. She underwent a right occipital nerve block on 5/2/2022. She reports about 50-60% relief lasting about 6 weeks in duration from this procedure. She states that this has since worn off.   She is happy she has obtained relief but she would like to try something I can give her a more robust response and longer lasting relief. She describes her pain to be as previously described without any changes. She denies any other new symptoms this point.       History of Interventions:   Surgery: Previous neck surgeries (C3/C4 and C7/T1 ACDF)  Injections: Right occipital nerve block (22) - 50-60% relief x 6 weeks    Imaging/Studies:  MRI C-spine         Past Medical History:   Diagnosis Date    Asthma     Dr. Carole St    Depression       from [facility] neck    Fibromyalgia     Hyperlipemia     check per Dr Angely Cooper     Insulin resistance     Narcolepsy 2000    per Dr. Angely Cooper via sleep study    Neck pain     cervical stenosis    PLMD (periodic limb movement disorder)     Prolonged emergence from general anesthesia     Tachycardia        Past Surgical History:   Procedure Laterality Date    ANKLE SURGERY Left 2017    OIO    ANKLE SURGERY Left 2020    Replacement and tendon repair    CARPAL TUNNEL RELEASE Right 2011    CERVICAL DISCECTOMY  2012    C3-4, C4-5    CERVICAL FUSION N/A 2019    REMOVAL OF PLATE B2-4, ACDF V0-5, C6-7 WITH PLATING/MEDTRONIC performed by Gil Gutierrez MD at [address]  2/12/15    Dr. Liane Singh      FOOT SURGERY Right     bone removal from foot on rt    FOOT SURGERY Left 2011    bone spur, tarsal tunnel and cyst removal on left    HYSTERECTOMY (CERVIX STATUS UNKNOWN)      total, endometriosis    JOINT REPLACEMENT      bilateral knee    LAPAROSCOPY      for endometriosis    OVARY REMOVAL  1996    TONSILLECTOMY AND ADENOIDECTOMY  2006    UPPER GASTROINTESTINAL ENDOSCOPY  2006       Family History   Problem Relation Age of Onset    COPD Father     Heart Disease Father 61        ekg changes    Cancer Sister 50        Multiple malaran     Irritable Bowel Syndrome Sister     Colon Cancer Sister 36 diagnosed age 42's     Arthritis Mother 48    COPD Maternal Aunt     Severe Sprains Maternal Uncle         epilepsy     Cancer Paternal Uncle 58        lung    Diabetes Maternal Grandmother     Kidney Disease Maternal Grandmother 72        Failure form DM     Cancer Maternal Grandfather         Mets all over     Coronary Art Dis Paternal Grandfather 35    Heart Disease Paternal Grandfather 35    Heart Attack Paternal Grandfather 35    Cancer Maternal Uncle         Lung     Stroke Maternal Uncle     Diabetes Maternal Uncle         NIDDM     Cancer Maternal Cousin         lung Cancer     Cancer Daughter         Skin Cancer          Medications & Allergies:   Current Outpatient Medications   Medication Instructions    ARIPiprazole (ABILIFY) 5 MG tablet TAKE 1 TABLET BY MOUTH ONCE DAILY    atenolol (TENORMIN) 50 MG tablet TAKE 1 AND 1/2 TABLETS BY MOUTH ONCE DAILY.     atorvastatin (LIPITOR) 80 mg, Oral, DAILY    baclofen (LIORESAL) 10 mg, Oral, 3 TIMES DAILY    Calcium Carbonate-Vitamin D (CALCIUM + D PO) 1,200 mg, DAILY    Coenzyme Q10 (CO Q 10) 10 MG CAPS 1 capsule, Oral, DAILY PRN    fluticasone (FLONASE) 50 MCG/ACT nasal spray 1 spray, Nasal, DAILY    levalbuterol (XOPENEX HFA) 45 MCG/ACT inhaler 2 puffs, Inhalation, EVERY 6 HOURS PRN    losartan (COZAAR) 25 mg, Oral, NIGHTLY    LYSINE 1,000 mg, Oral, 2 TIMES DAILY PRN    metFORMIN (GLUCOPHAGE-XR) 500 MG extended release tablet TAKE 4 TABLETS BY MOUTH IN THE EVENING    mometasone-formoterol (DULERA) 200-5 MCG/ACT inhaler 2 puffs, Inhalation, 2 TIMES DAILY    montelukast (SINGULAIR) 10 mg, Oral, NIGHTLY    Multiple Vitamins-Minerals (THERAPEUTIC MULTIVITAMIN-MINERALS) tablet 1 tablet, Oral, DAILY    nitroGLYCERIN (NITROSTAT) 0.4 mg, SubLINGual, EVERY 5 MIN PRN    omeprazole (PRILOSEC) 40 MG delayed release capsule TAKE 1 CAPSULE BY MOUTH IN THE MORNING BEFORE BREAKFAST.    rizatriptan (MAXALT) 10 mg, Oral, ONCE PRN, May repeat in 2 hours if needed    tiZANidine (ZANAFLEX) 4 MG tablet TAKE 1 TABLET BY MOUTH NIGHTLY AT BEDTIME AS NEEDED    topiramate (TOPAMAX) 50 MG tablet TAKE 1 TABLET BY MOUTH IN THE MORNING AND TAKE 2 TABLETS BY MOUTH IN THE AFTERNOON    venlafaxine (EFFEXOR XR) 150 MG extended release capsule TAKE 1 CAPSULE BY MOUTH 2 TIMES DAILY       Allergies   Allergen Reactions    Bactrim Other (See Comments)     Metallic taste    Iv Dye [Iodides] Nausea Only and Other (See Comments)     Says older IVP dye causes Stomach pains    Tolectin [Tolmetin Sodium] Other (See Comments)     Metallic taste    Claforan [Cefotaxime Sodium In D5w] Hives and Rash    Pcn [Penicillins] Hives and Rash       Review of Systems:   Constitutional: negative for weight changes or fevers  Genitourinary: negative for bowel/bladder incontinence   Musculoskeletal: positive for neck pain  Neurological: positive for migraines but negative for any arm weakness  Behavioral/Psych: negative for anxiety/depression   All other systems reviewed and are negative    Objective: There were no vitals filed for this visit. Constitutional: Pleasant, no acute distress   Head: Normocephalic, atraumatic   Eyes: Conjunctivae normal   Neck: Supple, symmetrical   Respiration: Non-labored breathing   Cardiovascular: Limbs warm and well perfused   Musculoskeletal: Reduced cervical ROM in all planes. Negative Spurling maneuver bilaterally. Increased pain with facet loading on RIGHT. Tenderness to palpation in RIGHT cervical paraspinals. Neuro: Alert, oriented. CN II-XII appear grossly intact. No focal upper limbs deficits. Skin: healed anterior neck incision scar  Psychological: Cooperative, no exaggerated pain behaviors         Assessment:    Diagnosis Orders   1. Cervical spondylosis  CHG FLUOR NEEDLE/CATH SPINE/PARASPINAL DX/THER ADDON    SD INJ DX/THER AGNT PARAVERT FACET JOINT, CERV/THORAC, 1ST LEVEL    SD INJ DX/THER AGNT PARAVERT FACET JOINT, CERV/THORAC, 2ND LEVEL   2.  Occipital neuralgia of right side     3. Neuropathic pain     4. Other chronic pain           Jose R Blackburn is a 78 y. o.female presenting to the pain clinic for evaluation of chronic right-sided neck pain and migraines. Her clinical history and physical examination are consistent with occipital neuralgia in both the greater and lesser occipital nerve distribution on the right. She underwent a right occipital nerve block in clinic. We will follow-up in 6 weeks. We may potentially pursue Botox injections versus right C2/C3 (third occipital nerve) block if she fails to respond. She had a good response to her occipital nerve block. I have set her up for right-sided cervical medial branch blocks targeting the facet joints of C2/C3 and C3/C4. Plan: The following treatment recommendations and plan were discussed in detail with Jose R Blackburn. Imaging/Studies/Labs:   I have reviewed patients imaging of MRI C-spine and results were discussed with patient today. Analgesics:   Patient is taking Excedrin as needed. Patient is advised to take as prescribed and not take on an empty stomach. Adjuvants: In light of the presence of a neuropathic component of pain, patient can continue Topamax twice daily. Interventions: In presence of cervical neck pain and migraines and with physical exam consistent for facetal pain, the option of medial branch blocks at RIGHT C2/C3 and C3/C4 was chosen. The risks and benefits were discussed in detail with the patient. Patient wants to proceed with the injection. Anticoagulation/NPO Recommendations:   Patient will need to hold Aspirin x 6 days prior to the procedure. Patient will need to be NPO x 8 hours prior to the procedure. Plan to start an IV prior to the procedure.     Multidisciplinary Pain Management:   In the presence of complex, chronic, and multi-factorial pain, the importance of a multidisciplinary approach to pain management in the patients management regimen was emphasized and discussed in great detail.      Referrals:  None    Prescriptions Written This Visit:   None    Follow-up: RIGHT cervical MBBs      Frankie Jo DO  Interventional Pain Management/PM&R   [place]

## 2022-08-02 ENCOUNTER — HOSPITAL ENCOUNTER (OUTPATIENT)
Age: 67
Setting detail: OUTPATIENT SURGERY
Discharge: HOME | End: 2022-08-02
Attending: ANESTHESIOLOGY | Admitting: ANESTHESIOLOGY
Payer: MEDICARE

## 2022-08-02 ENCOUNTER — APPOINTMENT (OUTPATIENT)
Dept: GENERAL RADIOLOGY | Age: 67
End: 2022-08-02
Attending: ANESTHESIOLOGY
Payer: MEDICARE

## 2022-08-02 VITALS
DIASTOLIC BLOOD PRESSURE: 63 MMHG | SYSTOLIC BLOOD PRESSURE: 116 MMHG | OXYGEN SATURATION: 97 % | TEMPERATURE: 96.7 F | WEIGHT: 166.2 LBS | BODY MASS INDEX: 25.19 KG/M2 | HEIGHT: 68 IN | HEART RATE: 73 BPM | RESPIRATION RATE: 16 BRPM

## 2022-08-02 LAB — GLUCOSE BLD-MCNC: 90 MG/DL (ref 70–108)

## 2022-08-02 PROCEDURE — 99152 MOD SED SAME PHYS/QHP 5/>YRS: CPT | Performed by: ANESTHESIOLOGY

## 2022-08-02 PROCEDURE — 2709999900 HC NON-CHARGEABLE SUPPLY: Performed by: ANESTHESIOLOGY

## 2022-08-02 PROCEDURE — 64490 INJ PARAVERT F JNT C/T 1 LEV: CPT | Performed by: ANESTHESIOLOGY

## 2022-08-02 PROCEDURE — 64491 INJ PARAVERT F JNT C/T 2 LEV: CPT | Performed by: ANESTHESIOLOGY

## 2022-08-02 PROCEDURE — 82948 REAGENT STRIP/BLOOD GLUCOSE: CPT

## 2022-08-02 PROCEDURE — 6360000002 HC RX W HCPCS: Performed by: ANESTHESIOLOGY

## 2022-08-02 PROCEDURE — 7100000010 HC PHASE II RECOVERY - FIRST 15 MIN: Performed by: ANESTHESIOLOGY

## 2022-08-02 PROCEDURE — 2500000003 HC RX 250 WO HCPCS: Performed by: ANESTHESIOLOGY

## 2022-08-02 PROCEDURE — 3600000054 HC PAIN LEVEL 3 BASE: Performed by: ANESTHESIOLOGY

## 2022-08-02 PROCEDURE — 3209999900 FLUORO FOR SURGICAL PROCEDURES

## 2022-08-02 PROCEDURE — 7100000011 HC PHASE II RECOVERY - ADDTL 15 MIN: Performed by: ANESTHESIOLOGY

## 2022-08-02 RX ORDER — FENTANYL CITRATE 50 UG/ML
INJECTION, SOLUTION INTRAMUSCULAR; INTRAVENOUS PRN
Status: DISCONTINUED | OUTPATIENT
Start: 2022-08-02 | End: 2022-08-02 | Stop reason: ALTCHOICE

## 2022-08-02 RX ORDER — ASPIRIN 325 MG
TABLET ORAL
COMMUNITY

## 2022-08-02 RX ORDER — LIDOCAINE HYDROCHLORIDE 10 MG/ML
INJECTION, SOLUTION EPIDURAL; INFILTRATION; INTRACAUDAL; PERINEURAL PRN
Status: DISCONTINUED | OUTPATIENT
Start: 2022-08-02 | End: 2022-08-02 | Stop reason: ALTCHOICE

## 2022-08-02 RX ORDER — MIDAZOLAM HYDROCHLORIDE 1 MG/ML
INJECTION INTRAMUSCULAR; INTRAVENOUS PRN
Status: DISCONTINUED | OUTPATIENT
Start: 2022-08-02 | End: 2022-08-02 | Stop reason: ALTCHOICE

## 2022-08-02 RX ORDER — BUPIVACAINE HYDROCHLORIDE 5 MG/ML
INJECTION, SOLUTION PERINEURAL PRN
Status: DISCONTINUED | OUTPATIENT
Start: 2022-08-02 | End: 2022-08-02 | Stop reason: ALTCHOICE

## 2022-08-02 ASSESSMENT — PAIN SCALES - GENERAL: PAINLEVEL_OUTOF10: 0

## 2022-08-02 ASSESSMENT — PAIN - FUNCTIONAL ASSESSMENT: PAIN_FUNCTIONAL_ASSESSMENT: 0-10

## 2022-08-02 NOTE — PROCEDURES
Pre-operative Diagnosis: Cervical neck pain     Post-operative Diagnosis: Cervical neck pain     Procedure: Cervical medial branch blocks targeting RIGHT C2/C3 and C3/C4     Procedure Description:  After having obtained a signed informed consent, the patient was taken to the fluoroscopy suite and placed in the prone position. The neck was prepped with chloraprep and draped in a sterile fashion. Then, 0.5 cc of  1 % lidocaine was used to anesthetize the skin and underlying subcutaneous superficial tissues. Under fluoroscopic guidance, 22G 3.5 inch spinal needles were advanced on to the mid facet pilar of C2/C3 joint, C3, and C4 on the RIGHT. There were no paresthesias, heme, or CSF aspiration. Needle placement was confirmed using the AP and lateral views. Then, 0.5 cc 0.5% bupivacaine was injected. The needles were removed without any complication. The patient tolerated the procedure well and was transported to the recovery room where he was observed for 15 minutes to then be discharged in ambulatory fashion.       Procedural Complications: None  Estimated Blood Loss: 0 mL      IV sedation was used during the procedure:  - Moderate intravenous conscious sedation was supervised by Dr. Laura Rodriguez  - The patient was independently monitored by a Registered Nurse assigned to the procedure room  - Monitoring included automated blood pressure, continuous EKG, and continuous pulse oximetry  - The detailed conscious record is permanently stored in the [place] [id number]  - The following is the conscious sedation record:  Start Time: 10:35  End Time : 10:50  Duration: 15 minutes   Medications Administered: 1 mg Versed, 50 mcg Fentanyl      Frankie Garber DO  Interventional Pain Management/PM&R   [place]

## 2022-08-02 NOTE — PROGRESS NOTES
1041 Received in Phase 2 . Drowsy responsive to verbal stimuli. Airway patent. O2 sat  97%  Injection site clean and dry. Denies pain on arrival.  1046 Drink and snack given.   1058 Assisted to sit at side of cart to get dressed  1115 Discharged home via private car without complaint

## 2022-08-02 NOTE — POST SEDATION
[address] [address]  Sedation/Analgesia Post Sedation Record    Pt Name: Julio César Michael  MRN: [id number]  YOB: 1955  Procedure Performed By: Anastacio Al DO  Primary Care Physician: Barry Cartwright MD    POST-PROCEDURE    Physicians/Assistants: Anastacio Al DO  Procedure Performed: See Procedure Note   Sedation/Anesthesia: Versed and Fentanyl (See procedure note for amount and duration)  Estimated Blood Loss:     0  ml  Specimens Removed: None        Complications: None           Frankie Michaels DO  Electronically signed 8/2/2022 at 12:18 PM

## 2022-08-02 NOTE — PRE SEDATION
[address] [address]  Pre-Sedation/Analgesia History & Physical    Pt Name: Michelle Galvez  MRN: [id number]  YOB: 1955  Provider Performing Procedure: Mary Sanders DO   Primary Care Physician: Zelalem Luna MD      MEDICAL HISTORY       has a past medical history of Asthma, Depression, Fibromyalgia, Hyperlipemia, Insulin resistance, Narcolepsy, Neck pain, PLMD (periodic limb movement disorder), Prolonged emergence from general anesthesia, and Tachycardia. SURGICAL HISTORY   has a past surgical history that includes Tonsillectomy and adenoidectomy (2006); Carpal tunnel release (Right, 2011); Foot surgery (Right, 2011); Foot surgery (Left, 2011); laparoscopy (2010); joint replacement (2009); Cervical discectomy (2012); Hysterectomy (1996); Upper gastrointestinal endoscopy (2006); Colonoscopy (2006); Coronary angioplasty with stent (2/12/15); Finger trigger release (2015); Ankle surgery (Left, 05/02/2017); cervical fusion (N/A, 12/18/2019); Ankle surgery (Left, 12/09/2020); and Ovary removal (1996). ALLERGIES   Allergies as of 07/20/2022 - Fully Reviewed 06/13/2022   Allergen Reaction Noted    Bactrim Other (See Comments) 10/03/2012    Iv dye [iodides] Nausea Only and Other (See Comments) 10/03/2012    Tolectin [tolmetin sodium] Other (See Comments) 10/03/2012    Claforan [cefotaxime sodium in d5w] Hives and Rash 10/03/2012    Pcn [penicillins] Hives and Rash 10/03/2012       MEDICATIONS   Prior to Admission medications    Medication Sig Start Date End Date Taking?  Authorizing Provider   aspirin 325 MG tablet     Historical Provider, MD   montelukast (SINGULAIR) 10 MG tablet TAKE 1 TABLET BY MOUTH NIGHTLY 7/22/22   JENNIFER Carter - SHILPA   mometasone-formoterol [facility]) 200-5 MCG/ACT inhaler Inhale 2 puffs into the lungs 2 times daily 5/3/22   JENNIFER Shi CNP   baclofen (LIORESAL) 10 MG tablet Take 1 tablet by mouth 3 times daily 4/18/22   Zelalem Luna MD   venlafaxine mg by mouth 2 times daily as needed     Historical Provider, MD     PHYSICAL:   Vitals:    08/02/22 1041   BP: 116/63   Pulse: 73   Resp: 16   Temp: (!) 96.7 °F (35.9 °C)   SpO2: 97%     PLANNED PROCEDURE   See procedure note  SEDATION  Planned agent: Versed and Fentanyl  ASA Classification: 2  Class 1: A normal healthy patient  Class 2: Pt with mild to moderate systemic disease  Class 3: Severe systemic disease or disturbance  Class 4: Severe systemic disorders that are already life threatening. Class 5: Moribund pt with little chances of survival, for more than 24 hours. Mallampati I Airway Classification: 2    1. Pre-procedure diagnostic studies complete and results available. 2. Previous sedation/anesthesia experiences assessed. 3. The patient is an appropriate candidate to undergo the planned procedure sedation and anesthesia. (Refer to nursing sedation/analgesia documentation record)  4. Formulation and discussion of sedation/procedure plan, risks, and expectations with patient and/or responsible adult completed. 5. Patient examined immediately prior to the procedure.  (Refer to nursing sedation/analgesia documentation record)    Durga Elliott DO  Electronically signed 8/2/2022 at 12:18 PM

## 2022-08-15 ENCOUNTER — OFFICE VISIT (OUTPATIENT)
Dept: PHYSICAL MEDICINE AND REHAB | Age: 67
End: 2022-08-15
Payer: MEDICARE

## 2022-08-15 VITALS
BODY MASS INDEX: 25.16 KG/M2 | HEIGHT: 68 IN | SYSTOLIC BLOOD PRESSURE: 130 MMHG | WEIGHT: 166 LBS | DIASTOLIC BLOOD PRESSURE: 78 MMHG

## 2022-08-15 DIAGNOSIS — M79.2 NEUROPATHIC PAIN: ICD-10-CM

## 2022-08-15 DIAGNOSIS — G89.29 OTHER CHRONIC PAIN: ICD-10-CM

## 2022-08-15 DIAGNOSIS — M47.812 CERVICAL SPONDYLOSIS: Primary | ICD-10-CM

## 2022-08-15 DIAGNOSIS — M54.81 OCCIPITAL NEURALGIA OF RIGHT SIDE: ICD-10-CM

## 2022-08-15 PROCEDURE — 1090F PRES/ABSN URINE INCON ASSESS: CPT | Performed by: ANESTHESIOLOGY

## 2022-08-15 PROCEDURE — 1123F ACP DISCUSS/DSCN MKR DOCD: CPT | Performed by: ANESTHESIOLOGY

## 2022-08-15 PROCEDURE — 1036F TOBACCO NON-USER: CPT | Performed by: ANESTHESIOLOGY

## 2022-08-15 PROCEDURE — 3017F COLORECTAL CA SCREEN DOC REV: CPT | Performed by: ANESTHESIOLOGY

## 2022-08-15 PROCEDURE — G8417 CALC BMI ABV UP PARAM F/U: HCPCS | Performed by: ANESTHESIOLOGY

## 2022-08-15 PROCEDURE — G8427 DOCREV CUR MEDS BY ELIG CLIN: HCPCS | Performed by: ANESTHESIOLOGY

## 2022-08-15 PROCEDURE — 99214 OFFICE O/P EST MOD 30 MIN: CPT | Performed by: ANESTHESIOLOGY

## 2022-08-15 PROCEDURE — G8399 PT W/DXA RESULTS DOCUMENT: HCPCS | Performed by: ANESTHESIOLOGY

## 2022-08-15 RX ORDER — CYCLOBENZAPRINE HCL 10 MG
30 TABLET ORAL 3 TIMES DAILY PRN
Qty: 30 TABLET | Refills: 0 | Status: SHIPPED | OUTPATIENT
Start: 2022-08-15 | End: 2022-08-17 | Stop reason: SDUPTHER

## 2022-08-15 NOTE — PROGRESS NOTES
Chronic Pain/PM&R Clinic Note     Encounter Date: 8/15/22    Subjective:   Chief Complaint:   Chief Complaint   Patient presents with    Follow Up After Procedure     CERVICAL FACET JOINT medial branch blocks  RIGHT Cervical 2- 3  3-4  8/2/22       History of Present Illness:   Quentin Canas is a 78 y.o. female seen in the clinic initially on 05/02/22 upon request from Alyssa Mckenzie MD (PCP) for her history of chronic neck pain and migraines. She has a medical history of 2 previous cervical neck surgeries including a C3/C4 and C7/T1 anterior cervical disc fusions. She has been dealing with migraines ever since she was a teenager. She states that she has been noticing worsening pain on the right side of her neck over the last few months without any inciting event. She states this pain is a constant 5/10 shooting, throbbing, stabbing pain. She states the pain will start at the base of her occiput on the right-hand side and radiate around to the top of her scalp around to her right eye and to her ear. She states that this can be worse with mastication but denies any associated blurry vision, double vision, ringing in the ears, or issues with photophobia or phonophobia. She states that she does have some very intermittent numbness/tingling in her left hand. She denies any worsening balance/gait disturbances. She states that she has been managing her pain with the use of Excedrin, Maxalt, Topamax, baclofen. She states she was close to having Botox done for her migraines but this was never pursued when she had to have her second surgery. 6/13/2022, patient presents for planned follow-up for chronic neck pain and migraines. She underwent a right occipital nerve block on 5/2/2022. She reports about 50-60% relief lasting about 6 weeks in duration from this procedure. She states that this has since worn off.   She is happy she has obtained relief but she would like to try something I can give her a more robust response and longer lasting relief. She describes her pain to be as previously described without any changes. She denies any other new symptoms this point. Today, 8/15/2022, patient presents for planned follow-up for chronic neck pain and migraines. She underwent right cervical medial branch blocks on 2022. She reports 100% relief on the right side of her neck and migraines lasting 3-4 days in duration. He states that she did have some muscle spasms and tightness after her injection wore off that were pretty painful on her lower neck and trapezius region. She would like to proceed with the confirmatory steps of her injection series in order to get to the ablation therapy as previously discussed. Denies any other new symptoms this point is wondering how she can combat some of the muscle tightness and spasms after her procedure.       History of Interventions:   Surgery: Previous neck surgeries (C3/C4 and C7/T1 ACDF)  Injections: Right occipital nerve block (22) - 50-60% relief x 6 weeks  RIGHT cervical MBBs (22) - 100% relief x 3-4 days     Imaging/Studies:  MRI C-spine         Past Medical History:   Diagnosis Date    Asthma     Dr. Zuleyma Albarado    Depression 2000      from [facility] neck    Fibromyalgia     Hyperlipemia     check per Dr Hemalatha Herrera     Insulin resistance     Narcolepsy     per Dr. Hemalatha Herrera via sleep study    Neck pain     cervical stenosis    PLMD (periodic limb movement disorder)     Prolonged emergence from general anesthesia     Tachycardia        Past Surgical History:   Procedure Laterality Date    ANKLE SURGERY Left 2017    OIO    ANKLE SURGERY Left 2020    Replacement and tendon repair    CARPAL TUNNEL RELEASE Right     CERVICAL DISCECTOMY  2012    C3-4, C4-5    CERVICAL FUSION N/A 2019    REMOVAL OF PLATE U1-8, ACDF P8-3, C6-7 WITH PLATING/MEDTRONIC performed by Rochelle Dutton MD at [address] [place] [address] CORONARY ANGIOPLASTY WITH STENT PLACEMENT  2/12/15    Dr. Chavo Fontana Right 8/2/2022    CERVICAL FACET JOINT medial branch blocks  RIGHT Cervical 2- 3  3-4 performed by Daylin Danielle DO at [place] [address]  2015    FOOT SURGERY Right 2011    bone removal from foot on rt    FOOT SURGERY Left 2011    bone spur, tarsal tunnel and cyst removal on left    HYSTERECTOMY (CERVIX STATUS UNKNOWN)  1996    total, endometriosis    JOINT REPLACEMENT  2009    bilateral knee    LAPAROSCOPY  2010    for endometriosis    OVARY REMOVAL  1996    TONSILLECTOMY AND ADENOIDECTOMY  2006    UPPER GASTROINTESTINAL ENDOSCOPY  2006       Family History   Problem Relation Age of Onset    COPD Father     Heart Disease Father 61        ekg changes    Cancer Sister 50        Multiple malaran     Irritable Bowel Syndrome Sister     Colon Cancer Sister 36        diagnosed age 42's     Arthritis Mother 48    COPD Maternal Aunt     Severe Sprains Maternal Uncle         epilepsy     Cancer Paternal Uncle 58        lung    Diabetes Maternal Grandmother     Kidney Disease Maternal Grandmother 72        Failure form DM     Cancer Maternal Grandfather         Mets all over     Coronary Art Dis Paternal Grandfather 35    Heart Disease Paternal Grandfather 35    Heart Attack Paternal Grandfather 35    Cancer Maternal Uncle         Lung     Stroke Maternal Uncle     Diabetes Maternal Uncle         NIDDM     Cancer Maternal Cousin         lung Cancer     Cancer Daughter         Skin Cancer          Medications & Allergies:   Current Outpatient Medications   Medication Instructions    ARIPiprazole (ABILIFY) 5 MG tablet TAKE 1 TABLET BY MOUTH ONCE DAILY    aspirin 325 MG tablet No dose, route, or frequency recorded. atenolol (TENORMIN) 50 MG tablet TAKE 1 AND 1/2 TABLETS BY MOUTH ONCE DAILY.     atorvastatin (LIPITOR) 80 mg, Oral, DAILY    baclofen (LIORESAL) 10 mg, Oral, 3 TIMES DAILY    Calcium Carbonate-Vitamin D (CALCIUM + D PO) 1,200 mg, DAILY    Coenzyme Q10 (CO Q 10) 10 MG CAPS 1 capsule, Oral, DAILY PRN    cyclobenzaprine (FLEXERIL) 30 mg, Oral, 3 TIMES DAILY PRN    fluticasone (FLONASE) 50 MCG/ACT nasal spray 1 spray, Nasal, DAILY    levalbuterol (XOPENEX HFA) 45 MCG/ACT inhaler 2 puffs, Inhalation, EVERY 6 HOURS PRN    losartan (COZAAR) 25 mg, Oral, NIGHTLY    LYSINE 1,000 mg, Oral, 2 TIMES DAILY PRN    metFORMIN (GLUCOPHAGE-XR) 500 MG extended release tablet TAKE 4 TABLETS BY MOUTH IN THE EVENING    mometasone-formoterol (DULERA) 200-5 MCG/ACT inhaler 2 puffs, Inhalation, 2 TIMES DAILY    montelukast (SINGULAIR) 10 mg, Oral, NIGHTLY    Multiple Vitamins-Minerals (THERAPEUTIC MULTIVITAMIN-MINERALS) tablet 1 tablet, Oral, DAILY    nitroGLYCERIN (NITROSTAT) 0.4 mg, SubLINGual, EVERY 5 MIN PRN    omeprazole (PRILOSEC) 40 MG delayed release capsule TAKE 1 CAPSULE BY MOUTH IN THE MORNING BEFORE BREAKFAST.    rizatriptan (MAXALT) 10 mg, Oral, ONCE PRN, May repeat in 2 hours if needed    tiZANidine (ZANAFLEX) 4 MG tablet TAKE 1 TABLET BY MOUTH NIGHTLY AT BEDTIME AS NEEDED    topiramate (TOPAMAX) 50 MG tablet TAKE 1 TABLET BY MOUTH IN THE MORNING AND TAKE 2 TABLETS BY MOUTH IN THE AFTERNOON    venlafaxine (EFFEXOR XR) 150 MG extended release capsule TAKE 1 CAPSULE BY MOUTH 2 TIMES DAILY       Allergies   Allergen Reactions    Bactrim Other (See Comments)     Metallic taste    Iv Dye [Iodides] Nausea Only and Other (See Comments)     Says older IVP dye causes Stomach pains    Tolectin [Tolmetin Sodium] Other (See Comments)     Metallic taste    Claforan [Cefotaxime Sodium In D5w] Hives and Rash    Pcn [Penicillins] Hives and Rash       Review of Systems:   Constitutional: negative for weight changes or fevers  Genitourinary: negative for bowel/bladder incontinence   Musculoskeletal: positive for neck pain  Neurological: positive for migraines but negative for any arm weakness  Behavioral/Psych: negative for anxiety/depression   All other systems reviewed and are negative    Objective:     Vitals:    08/15/22 0913   BP: 130/78       Constitutional: Pleasant, no acute distress   Head: Normocephalic, atraumatic   Eyes: Conjunctivae normal   Neck: Supple, symmetrical   Respiration: Non-labored breathing   Cardiovascular: Limbs warm and well perfused   Musculoskeletal: Reduced cervical ROM in all planes. Negative Spurling maneuver bilaterally. Increased pain with facet loading on RIGHT. Tenderness to palpation in RIGHT cervical paraspinals. Neuro: Alert, oriented. CN II-XII appear grossly intact. No focal upper limbs deficits. Skin: healed anterior neck incision scar  Psychological: Cooperative, no exaggerated pain behaviors         Assessment:    Diagnosis Orders   1. Cervical spondylosis  CHG FLUOR NEEDLE/CATH SPINE/PARASPINAL DX/THER ADDON    DC INJ DX/THER AGNT PARAVERT FACET JOINT, CERV/THORAC, 1ST LEVEL    DC INJ DX/THER AGNT PARAVERT FACET JOINT, CERV/THORAC, 2ND LEVEL      2. Occipital neuralgia of right side        3. Neuropathic pain        4. Other chronic pain                Noemi Mcdonald is a 78 y. o.female presenting to the pain clinic for evaluation of chronic right-sided neck pain and migraines. Her clinical history and physical examination are consistent with occipital neuralgia in both the greater and lesser occipital nerve distribution on the right. She underwent a right occipital nerve block in clinic. We will follow-up in 6 weeks. We may potentially pursue Botox injections versus right C2/C3 (third occipital nerve) block if she fails to respond. She had a good response to her occipital nerve block. I have set her up for right-sided cervical medial branch blocks targeting the facet joints of C2/C3 and C3/C4. She responded significantly to her right-sided cervical medial branch blocks and I set her up for confirmatory blocks at the same levels.   Given her prescription for Flexeril to help with postprocedural muscle spasms. Plan: The following treatment recommendations and plan were discussed in detail with Yaya Tobin. Imaging/Studies/Labs:   I have reviewed patients imaging of MRI C-spine and results were discussed with patient today. Analgesics:   Patient is taking Excedrin as needed. Patient is advised to take as prescribed and not take on an empty stomach. Adjuvants: In light of the presence of a neuropathic component of pain, patient can continue Topamax twice daily. Interventions: In presence of cervical neck pain and migraines and with physical exam consistent for facetal pain, confirmatory medial branch blocks at RIGHT C2/C3 and C3/C4 was chosen. The risks and benefits were discussed in detail with the patient. Patient wants to proceed with the injection. Anticoagulation/NPO Recommendations:   Patient will need to hold Aspirin x 6 days prior to the procedure. Patient will need to be NPO x 8 hours prior to the procedure. Plan to start an IV prior to the procedure. Multidisciplinary Pain Management:   In the presence of complex, chronic, and multi-factorial pain, the importance of a multidisciplinary approach to pain management in the patients management regimen was emphasized and discussed in great detail.      Referrals:  None    Prescriptions Written This Visit:   Flexeril    Follow-up: confirmatory RIGHT cervical MBBs      Derik Octavio Goodpasture, DO  Interventional Pain Management/PM&R   [place]

## 2022-08-16 ENCOUNTER — TELEPHONE (OUTPATIENT)
Dept: PHYSICAL MEDICINE AND REHAB | Age: 67
End: 2022-08-16

## 2022-08-16 NOTE — TELEPHONE ENCOUNTER
Roby Tuttle from [address] [place] [address] called and states needs clarification on script for flexeril 10 mg. Asking should script be for 90 tabs instead of 30? Roby Tuttle states would like for a new script to be sent over.      Please advise

## 2022-08-17 RX ORDER — CYCLOBENZAPRINE HCL 10 MG
10 TABLET ORAL 3 TIMES DAILY PRN
Qty: 90 TABLET | Refills: 0 | Status: SHIPPED | OUTPATIENT
Start: 2022-08-17 | End: 2022-09-16

## 2022-08-19 ENCOUNTER — TELEPHONE (OUTPATIENT)
Dept: PHYSICAL MEDICINE AND REHAB | Age: 67
End: 2022-08-19

## 2022-08-19 NOTE — TELEPHONE ENCOUNTER
PA sent to plan    Approved today  PA Case: [id number], Status: Approved, Coverage Starts on: 5/20/2022 12:00:00 AM, Coverage Ends on: 8/19/2023 12:00:00 AM.    cyclobenzaprine (FLEXERIL) 10 MG tablet

## 2022-08-29 NOTE — DISCHARGE INSTRUCTIONS

## 2022-09-01 ENCOUNTER — PREP FOR PROCEDURE (OUTPATIENT)
Dept: PHYSICAL MEDICINE AND REHAB | Age: 67
End: 2022-09-01

## 2022-09-05 NOTE — H&P
Today, patient presents for planned confirmatory medial branch blocks at RIGHT C2/C3 and C3/C4    This note is reflective of the patient's previous visit for evaluation. We will proceed with today's planned procedure. Since patient's last visit for evaluation, there have been no interval changes in medical history. Patient has no new numbness, weakness, or focal neurological deficit since evaluation. Patient has no contraindications to injection (no anticoagulation or recent antibiotic intake for active infections), and has a  present or is able to drive themselves (as discussed and cleared by physician). Allergies to latex, contrast dye, and steroid medications have been confirmed with the patient prior to the procedure. NPO necessity has been assessed and accepted based on procedure complexity. The risks and benefits of the procedure have been explained including but are not limited to infection, bleeding, paralysis, immediate post procedure weakness, and dizziness; the patient acknowledges understanding and desires to proceed with the procedure. Patient has signed consent for same procedure as discussed in previous clinic encounter. All other questions and concerns were addressed at bedside. See procedure note for full details. Post procedure Instructions: The patient was advised not to drive during the day of the procedure and not to engage in any significant decision making (unless otherwise states by physician). The patient was also advised to be cautious with walking/activity for 24 hours following today's visit and asked not to engage in over-exertion (unless otherwise states by physician). After this time, it is ok to resume pre-procedure level of activity. Patient advised to apply ice to site of injection in situations of pain and discomfort. Patient advised to not submerge site of injection during bath or pool activities for approximately 24 hours post-procedure.  Patient attested to something I can give her a more robust response and longer lasting relief. She describes her pain to be as previously described without any changes. She denies any other new symptoms this point. Today, 8/15/2022, patient presents for planned follow-up for chronic neck pain and migraines. She underwent right cervical medial branch blocks on 2022. She reports 100% relief on the right side of her neck and migraines lasting 3-4 days in duration. He states that she did have some muscle spasms and tightness after her injection wore off that were pretty painful on her lower neck and trapezius region. She would like to proceed with the confirmatory steps of her injection series in order to get to the ablation therapy as previously discussed. Denies any other new symptoms this point is wondering how she can combat some of the muscle tightness and spasms after her procedure.       History of Interventions:   Surgery: Previous neck surgeries (C3/C4 and C7/T1 ACDF)  Injections: Right occipital nerve block (22) - 50-60% relief x 6 weeks  RIGHT cervical MBBs (22) - 100% relief x 3-4 days     Imaging/Studies:  MRI C-spine         Past Medical History:   Diagnosis Date    Asthma     Dr. Nathan Maya    Depression 2000      from [facility] neck    Fibromyalgia     Hyperlipemia     check per Dr Santosh Matthews     Insulin resistance     Narcolepsy 2000    per Dr. Santosh Matthews via sleep study    Neck pain     cervical stenosis    PLMD (periodic limb movement disorder)     Prolonged emergence from general anesthesia 2000    Tachycardia        Past Surgical History:   Procedure Laterality Date    ANKLE SURGERY Left 2017    OIO    ANKLE SURGERY Left 2020    Replacement and tendon repair    CARPAL TUNNEL RELEASE Right 2011    CERVICAL DISCECTOMY  2012    C3-4, C4-5    CERVICAL FUSION N/A 2019    REMOVAL OF PLATE Q4-9, ACDF S7-3, C6-7 WITH PLATING/MEDTRONIC performed by Arden Carrington MD at [facility]    COLONOSCOPY  2006    CORONARY ANGIOPLASTY WITH STENT PLACEMENT  2/12/15    Dr. Jessica Perez Right 8/2/2022    CERVICAL FACET JOINT medial branch blocks  RIGHT Cervical 2- 3  3-4 performed by Scott López DO at [facility]  2015    FOOT SURGERY Right 2011    bone removal from foot on rt    FOOT SURGERY Left 2011    bone spur, tarsal tunnel and cyst removal on left    HYSTERECTOMY (CERVIX STATUS UNKNOWN)  1996    total, endometriosis    JOINT REPLACEMENT  2009    bilateral knee    LAPAROSCOPY  2010    for endometriosis    OVARY REMOVAL  1996    TONSILLECTOMY AND ADENOIDECTOMY  2006    UPPER GASTROINTESTINAL ENDOSCOPY  2006       Family History   Problem Relation Age of Onset    COPD Father     Heart Disease Father 61        ekg changes    Cancer Sister 50        Multiple malaran     Irritable Bowel Syndrome Sister     Colon Cancer Sister 36        diagnosed age 42's     Arthritis Mother 48    COPD Maternal Aunt     Severe Sprains Maternal Uncle         epilepsy     Cancer Paternal Uncle 58        lung    Diabetes Maternal Grandmother     Kidney Disease Maternal Grandmother 72        Failure form DM     Cancer Maternal Grandfather         Mets all over     Coronary Art Dis Paternal Grandfather 35    Heart Disease Paternal Grandfather 35    Heart Attack Paternal Grandfather 35    Cancer Maternal Uncle         Lung     Stroke Maternal Uncle     Diabetes Maternal Uncle         NIDDM     Cancer Maternal Cousin         lung Cancer     Cancer Daughter         Skin Cancer          Medications & Allergies:   Current Outpatient Medications   Medication Instructions    ARIPiprazole (ABILIFY) 5 MG tablet TAKE 1 TABLET BY MOUTH ONCE DAILY    aspirin 325 MG tablet No dose, route, or frequency recorded. atenolol (TENORMIN) 50 MG tablet TAKE 1 AND 1/2 TABLETS BY MOUTH ONCE DAILY.     atorvastatin (LIPITOR) 80 mg, Oral, DAILY    baclofen (LIORESAL) 10 mg, Oral, 3 TIMES DAILY    Calcium Carbonate-Vitamin D (CALCIUM + D PO) 1,200 mg, DAILY    Coenzyme Q10 (CO Q 10) 10 MG CAPS 1 capsule, Oral, DAILY PRN    cyclobenzaprine (FLEXERIL) 10 mg, Oral, 3 TIMES DAILY PRN    fluticasone (FLONASE) 50 MCG/ACT nasal spray 1 spray, Nasal, DAILY    levalbuterol (XOPENEX HFA) 45 MCG/ACT inhaler 2 puffs, Inhalation, EVERY 6 HOURS PRN    losartan (COZAAR) 25 mg, Oral, NIGHTLY    LYSINE 1,000 mg, Oral, 2 TIMES DAILY PRN    metFORMIN (GLUCOPHAGE-XR) 500 MG extended release tablet TAKE 4 TABLETS BY MOUTH IN THE EVENING    mometasone-formoterol (DULERA) 200-5 MCG/ACT inhaler 2 puffs, Inhalation, 2 TIMES DAILY    montelukast (SINGULAIR) 10 mg, Oral, NIGHTLY    Multiple Vitamins-Minerals (THERAPEUTIC MULTIVITAMIN-MINERALS) tablet 1 tablet, Oral, DAILY    nitroGLYCERIN (NITROSTAT) 0.4 mg, SubLINGual, EVERY 5 MIN PRN    omeprazole (PRILOSEC) 40 MG delayed release capsule TAKE 1 CAPSULE BY MOUTH IN THE MORNING BEFORE BREAKFAST.    rizatriptan (MAXALT) 10 mg, Oral, ONCE PRN, May repeat in 2 hours if needed    tiZANidine (ZANAFLEX) 4 MG tablet TAKE 1 TABLET BY MOUTH NIGHTLY AT BEDTIME AS NEEDED    topiramate (TOPAMAX) 50 MG tablet TAKE 1 TABLET BY MOUTH IN THE MORNING AND TAKE 2 TABLETS BY MOUTH IN THE AFTERNOON    venlafaxine (EFFEXOR XR) 150 MG extended release capsule TAKE 1 CAPSULE BY MOUTH 2 TIMES DAILY       Allergies   Allergen Reactions    Bactrim Other (See Comments)     Metallic taste    Iv Dye [Iodides] Nausea Only and Other (See Comments)     Says older IVP dye causes Stomach pains    Tolectin [Tolmetin Sodium] Other (See Comments)     Metallic taste    Claforan [Cefotaxime Sodium In D5w] Hives and Rash    Pcn [Penicillins] Hives and Rash       Review of Systems:   Constitutional: negative for weight changes or fevers  Genitourinary: negative for bowel/bladder incontinence   Musculoskeletal: positive for neck pain  Neurological: positive for migraines but negative for any arm weakness  Behavioral/Psych: negative for anxiety/depression   All other systems reviewed and are negative    Objective: There were no vitals filed for this visit. Constitutional: Pleasant, no acute distress   Head: Normocephalic, atraumatic   Eyes: Conjunctivae normal   Neck: Supple, symmetrical   Respiration: Non-labored breathing   Cardiovascular: Limbs warm and well perfused   Musculoskeletal: Reduced cervical ROM in all planes. Negative Spurling maneuver bilaterally. Increased pain with facet loading on RIGHT. Tenderness to palpation in RIGHT cervical paraspinals. Neuro: Alert, oriented. CN II-XII appear grossly intact. No focal upper limbs deficits. Skin: healed anterior neck incision scar  Psychological: Cooperative, no exaggerated pain behaviors         Assessment:    Diagnosis Orders   1. Cervical spondylosis  CHG FLUOR NEEDLE/CATH SPINE/PARASPINAL DX/THER ADDON    [place] INJ DX/THER AGNT PARAVERT FACET JOINT, CERV/THORAC, 1ST LEVEL    [place] INJ DX/THER AGNT PARAVERT FACET JOINT, CERV/THORAC, 2ND LEVEL      2. Occipital neuralgia of right side        3. Neuropathic pain        4. Other chronic pain                Deneise Saldivar is a 78 y. o.female presenting to the pain clinic for evaluation of chronic right-sided neck pain and migraines. Her clinical history and physical examination are consistent with occipital neuralgia in both the greater and lesser occipital nerve distribution on the right. She underwent a right occipital nerve block in clinic. We will follow-up in 6 weeks. We may potentially pursue Botox injections versus right C2/C3 (third occipital nerve) block if she fails to respond. She had a good response to her occipital nerve block. I have set her up for right-sided cervical medial branch blocks targeting the facet joints of C2/C3 and C3/C4.   She responded significantly to her right-sided cervical medial branch blocks and I set her up for confirmatory blocks at the same levels. Given her prescription for Flexeril to help with postprocedural muscle spasms. Plan: The following treatment recommendations and plan were discussed in detail with Kj Goodrich. Imaging/Studies/Labs:   I have reviewed patients imaging of MRI C-spine and results were discussed with patient today. Analgesics:   Patient is taking Excedrin as needed. Patient is advised to take as prescribed and not take on an empty stomach. Adjuvants: In light of the presence of a neuropathic component of pain, patient can continue Topamax twice daily. Interventions: In presence of cervical neck pain and migraines and with physical exam consistent for facetal pain, confirmatory medial branch blocks at RIGHT C2/C3 and C3/C4 was chosen. The risks and benefits were discussed in detail with the patient. Patient wants to proceed with the injection. Anticoagulation/NPO Recommendations:   Patient will need to hold Aspirin x 6 days prior to the procedure. Patient will need to be NPO x 8 hours prior to the procedure. Plan to start an IV prior to the procedure. Multidisciplinary Pain Management:   In the presence of complex, chronic, and multi-factorial pain, the importance of a multidisciplinary approach to pain management in the patients management regimen was emphasized and discussed in great detail.      Referrals:  None    Prescriptions Written This Visit:   Flexeril    Follow-up: confirmatory RIGHT cervical MBBs      Frankie Verduzco DO  Interventional Pain Management/PM&R   [place]

## 2022-09-06 ENCOUNTER — APPOINTMENT (OUTPATIENT)
Dept: GENERAL RADIOLOGY | Age: 67
End: 2022-09-06
Attending: ANESTHESIOLOGY
Payer: MEDICARE

## 2022-09-06 ENCOUNTER — HOSPITAL ENCOUNTER (OUTPATIENT)
Age: 67
Setting detail: OUTPATIENT SURGERY
Discharge: HOME | End: 2022-09-06
Attending: ANESTHESIOLOGY | Admitting: ANESTHESIOLOGY
Payer: MEDICARE

## 2022-09-06 VITALS
TEMPERATURE: 98 F | SYSTOLIC BLOOD PRESSURE: 108 MMHG | RESPIRATION RATE: 16 BRPM | HEIGHT: 68 IN | OXYGEN SATURATION: 98 % | BODY MASS INDEX: 25.61 KG/M2 | DIASTOLIC BLOOD PRESSURE: 53 MMHG | HEART RATE: 66 BPM | WEIGHT: 169 LBS

## 2022-09-06 LAB — GLUCOSE BLD-MCNC: 105 MG/DL (ref 70–108)

## 2022-09-06 PROCEDURE — 6360000002 HC RX W HCPCS: Performed by: ANESTHESIOLOGY

## 2022-09-06 PROCEDURE — 7100000010 HC PHASE II RECOVERY - FIRST 15 MIN: Performed by: ANESTHESIOLOGY

## 2022-09-06 PROCEDURE — 2709999900 HC NON-CHARGEABLE SUPPLY: Performed by: ANESTHESIOLOGY

## 2022-09-06 PROCEDURE — 64490 INJ PARAVERT F JNT C/T 1 LEV: CPT | Performed by: ANESTHESIOLOGY

## 2022-09-06 PROCEDURE — 7100000011 HC PHASE II RECOVERY - ADDTL 15 MIN: Performed by: ANESTHESIOLOGY

## 2022-09-06 PROCEDURE — 82948 REAGENT STRIP/BLOOD GLUCOSE: CPT

## 2022-09-06 PROCEDURE — 3600000054 HC PAIN LEVEL 3 BASE: Performed by: ANESTHESIOLOGY

## 2022-09-06 PROCEDURE — 99152 MOD SED SAME PHYS/QHP 5/>YRS: CPT | Performed by: ANESTHESIOLOGY

## 2022-09-06 PROCEDURE — 64491 INJ PARAVERT F JNT C/T 2 LEV: CPT | Performed by: ANESTHESIOLOGY

## 2022-09-06 PROCEDURE — 3209999900 FLUORO FOR SURGICAL PROCEDURES

## 2022-09-06 PROCEDURE — 2500000003 HC RX 250 WO HCPCS: Performed by: ANESTHESIOLOGY

## 2022-09-06 RX ORDER — BUPIVACAINE HYDROCHLORIDE 5 MG/ML
INJECTION, SOLUTION PERINEURAL PRN
Status: DISCONTINUED | OUTPATIENT
Start: 2022-09-06 | End: 2022-09-06 | Stop reason: ALTCHOICE

## 2022-09-06 RX ORDER — MIDAZOLAM HYDROCHLORIDE 1 MG/ML
INJECTION INTRAMUSCULAR; INTRAVENOUS PRN
Status: DISCONTINUED | OUTPATIENT
Start: 2022-09-06 | End: 2022-09-06 | Stop reason: ALTCHOICE

## 2022-09-06 RX ORDER — LIDOCAINE HYDROCHLORIDE 10 MG/ML
INJECTION, SOLUTION EPIDURAL; INFILTRATION; INTRACAUDAL; PERINEURAL PRN
Status: DISCONTINUED | OUTPATIENT
Start: 2022-09-06 | End: 2022-09-06 | Stop reason: ALTCHOICE

## 2022-09-06 RX ORDER — FENTANYL CITRATE 50 UG/ML
INJECTION, SOLUTION INTRAMUSCULAR; INTRAVENOUS PRN
Status: DISCONTINUED | OUTPATIENT
Start: 2022-09-06 | End: 2022-09-06 | Stop reason: ALTCHOICE

## 2022-09-06 ASSESSMENT — PAIN - FUNCTIONAL ASSESSMENT: PAIN_FUNCTIONAL_ASSESSMENT: 0-10

## 2022-09-06 ASSESSMENT — PAIN DESCRIPTION - DESCRIPTORS: DESCRIPTORS: ACHING

## 2022-09-06 NOTE — PRE SEDATION
[address] [address]  Pre-Sedation/Analgesia History & Physical    Pt Name: Royce Natarajan  MRN: [id number]  YOB: 1955  Provider Performing Procedure: Robert Sheth DO   Primary Care Physician: Los Choi MD      MEDICAL HISTORY       has a past medical history of Asthma, Depression, Fibromyalgia, Hyperlipemia, Insulin resistance, Narcolepsy, Neck pain, PLMD (periodic limb movement disorder), Prolonged emergence from general anesthesia, and Tachycardia. SURGICAL HISTORY   has a past surgical history that includes Tonsillectomy and adenoidectomy (2006); Carpal tunnel release (Right, 2011); Foot surgery (Right, 2011); Foot surgery (Left, 2011); laparoscopy (2010); joint replacement (2009); Cervical discectomy (2012); Hysterectomy (1996); Upper gastrointestinal endoscopy (2006); Colonoscopy (2006); Coronary angioplasty with stent (2/12/15); Finger trigger release (2015); Ankle surgery (Left, 05/02/2017); cervical fusion (N/A, 12/18/2019); Ankle surgery (Left, 12/09/2020); Ovary removal (1996); and Facet joint injection (Right, 8/2/2022). ALLERGIES   Allergies as of 08/16/2022 - Fully Reviewed 08/15/2022   Allergen Reaction Noted    Bactrim Other (See Comments) 10/03/2012    Iv dye [iodides] Nausea Only and Other (See Comments) 10/03/2012    Tolectin [tolmetin sodium] Other (See Comments) 10/03/2012    Claforan [cefotaxime sodium in d5w] Hives and Rash 10/03/2012    Pcn [penicillins] Hives and Rash 10/03/2012       MEDICATIONS   Prior to Admission medications    Medication Sig Start Date End Date Taking?  Authorizing Provider   cyclobenzaprine (FLEXERIL) 10 MG tablet Take 1 tablet by mouth 3 times daily as needed for Muscle spasms 8/17/22 9/16/22  Frankie Garber DO   aspirin 325 MG tablet     Historical Provider, MD   montelukast (SINGULAIR) 10 MG tablet TAKE 1 TABLET BY MOUTH NIGHTLY 7/22/22   Nat Herbert, APRN - CNP   mometasone-formoterol (DULERA) 200-5 MCG/ACT inhaler Inhale 2 puffs into the lungs 2 times daily 5/3/22   JENNIFER Mendez CNP   baclofen (LIORESAL) 10 MG tablet Take 1 tablet by mouth 3 times daily 4/18/22   Sukhdev Keys MD   venlafaxine (EFFEXOR XR) 150 MG extended release capsule TAKE 1 CAPSULE BY MOUTH 2 TIMES DAILY 3/29/22   Sukhdev Keys MD   atenolol (TENORMIN) 50 MG tablet TAKE 1 AND 1/2 TABLETS BY MOUTH ONCE DAILY. 3/22/22   Sukhdev Keys MD   omeprazole (PRILOSEC) 40 MG delayed release capsule TAKE 1 CAPSULE BY MOUTH IN THE MORNING BEFORE BREAKFAST. 3/16/22   Sukhdev Keys MD   metFORMIN (GLUCOPHAGE-XR) 500 MG extended release tablet TAKE 4 TABLETS BY MOUTH IN THE EVENING 3/16/22   Sukhdev Keys MD   topiramate (TOPAMAX) 50 MG tablet TAKE 1 TABLET BY MOUTH IN THE MORNING AND TAKE 2 TABLETS BY MOUTH IN THE AFTERNOON 3/16/22   Sukhdev Keys MD   levalbuterol [facility]) 45 MCG/ACT inhaler Inhale 2 puffs into the lungs every 6 hours as needed for Wheezing or Shortness of Breath 10/19/21 10/19/22  JENNIFER Calabrese CNP   rizatriptan (MAXALT) 10 MG tablet Take 1 tablet by mouth once as needed for Migraine May repeat in 2 hours if needed 9/20/21 9/6/22  Sukhdev Keys MD   atorvastatin (LIPITOR) 80 MG tablet Take 1 tablet by mouth daily 9/20/21   Sukhdev Keys MD   losartan (COZAAR) 25 MG tablet Take 1 tablet by mouth nightly 9/20/21   Sukhdev Keys MD   ARIPiprazole (ABILIFY) 5 MG tablet TAKE 1 TABLET BY MOUTH ONCE DAILY 9/2/21   Sukhdev Keys MD   tiZANidine (ZANAFLEX) 4 MG tablet TAKE 1 TABLET BY MOUTH NIGHTLY AT BEDTIME AS NEEDED 7/19/21   Sukhdev Keys MD   fluticasone (FLONASE) 50 MCG/ACT nasal spray 1 spray by Nasal route daily 3/15/21   Sukhdev Keys MD   nitroGLYCERIN (NITROSTAT) 0.4 MG SL tablet Place 1 tablet under the tongue every 5 minutes as needed for Chest pain.  2/12/15   [address], MD   Multiple Vitamins-Minerals (THERAPEUTIC MULTIVITAMIN-MINERALS) tablet Take 1 tablet by mouth daily     Historical Provider, MD   Coenzyme Q10 (CO Q 10) 10 MG CAPS Take 1 capsule by mouth daily as needed     Historical Provider, MD   Calcium Carbonate-Vitamin D (CALCIUM + D PO) Take 1,200 mg by mouth daily. Historical Provider, MD   LYSINE Take 1,000 mg by mouth 2 times daily as needed     Historical Provider, MD     PHYSICAL:   Vitals:    09/06/22 0718   BP: 105/71   Pulse: 69   Resp: 16   Temp: 98.9 °F (37.2 °C)   SpO2: 98%     PLANNED PROCEDURE   See procedure note  SEDATION  Planned agent: Versed and Fentanyl  ASA Classification: 2  Class 1: A normal healthy patient  Class 2: Pt with mild to moderate systemic disease  Class 3: Severe systemic disease or disturbance  Class 4: Severe systemic disorders that are already life threatening. Class 5: Moribund pt with little chances of survival, for more than 24 hours. Mallampati I Airway Classification: 2    1. Pre-procedure diagnostic studies complete and results available. 2. Previous sedation/anesthesia experiences assessed. 3. The patient is an appropriate candidate to undergo the planned procedure sedation and anesthesia. (Refer to nursing sedation/analgesia documentation record)  4. Formulation and discussion of sedation/procedure plan, risks, and expectations with patient and/or responsible adult completed. 5. Patient examined immediately prior to the procedure.  (Refer to nursing sedation/analgesia documentation record)    Meek Arreola DO  Electronically signed 9/6/2022 at 7:50 AM

## 2022-09-06 NOTE — PROCEDURES
Pre-operative Diagnosis: Cervical neck pain     Post-operative Diagnosis: Cervical neck pain     Procedure: Cervical medial branch blocks targeting RIGHT C2/C3 and C3/C4     Procedure Description:  After having obtained a signed informed consent, the patient was taken to the fluoroscopy suite and placed in the prone position. The neck was prepped with chloraprep and draped in a sterile fashion. Then, 0.5 cc of  1 % lidocaine was used to anesthetize the skin and underlying subcutaneous superficial tissues. Under fluoroscopic guidance, 22G 3.5 inch spinal needles were advanced on to the mid facet pilar of C2/C3 joint, C3, and C4 on the RIGHT. There were no paresthesias, heme, or CSF aspiration. Needle placement was confirmed using the AP and lateral views. Then, 0.5 cc 0.5% bupivacaine was injected. The needles were removed without any complication. The patient tolerated the procedure well and was transported to the recovery room where he was observed for 15 minutes to then be discharged in ambulatory fashion.       Procedural Complications: None  Estimated Blood Loss: 0 mL        IV sedation was used during the procedure:  - Moderate intravenous conscious sedation was supervised by Dr. Carol Holbrook  - The patient was independently monitored by a Registered Nurse assigned to the procedure room  - Monitoring included automated blood pressure, continuous EKG, and continuous pulse oximetry  - The detailed conscious record is permanently stored in the [address] [address]  - The following is the conscious sedation record:  Start Time: 07:53  End Time : 08:08  Duration: 15 minutes   Medications Administered: 1 mg Versed, 50 mcg Fentanyl        Frankie Garber DO  Interventional Pain Management/PM&R   [place]

## 2022-09-06 NOTE — POST SEDATION
[address] [address]  Sedation/Analgesia Post Sedation Record    Pt Name: Batool Myrick  MRN: [id number]  YOB: 1955  Procedure Performed By: Meek Arreola DO  Primary Care Physician: Lei Lentz MD    POST-PROCEDURE    Physicians/Assistants: Meek Arreola DO  Procedure Performed: See Procedure Note   Sedation/Anesthesia: Versed and Fentanyl (See procedure note for amount and duration)  Estimated Blood Loss:     0  ml  Specimens Removed: None        Complications: None           Derik Gilford Kinds, DO  Electronically signed 9/6/2022 at 7:51 AM

## 2022-09-06 NOTE — PROGRESS NOTES
9545-  Patient arrived to phase II via cart. Spontaneous respiraitons even and unlabored. Placed on monitor--VSS. Report received from [facility]. 8517-  Assessment completed. Patient is drowsy, but responds to name and follows commands. IV capped off-- no complications. Patient denies pain--will monitor. Injection sites clean and dry. 9882-  Snack and drink given to patient. Family in car. 3239-  All belongings in room. 4034-  IV removed-- no complications. Bandage applied. Xenocrates.Balo-  Patient dressing. 1543-  Patient discharged in stable condition with all belongings. This RN walked patient to car.

## 2022-09-09 RX ORDER — ATORVASTATIN CALCIUM 80 MG/1
TABLET, FILM COATED ORAL
Qty: 90 TABLET | Refills: 1 | Status: SHIPPED | OUTPATIENT
Start: 2022-09-09 | End: 2022-09-29 | Stop reason: SDUPTHER

## 2022-09-09 NOTE — TELEPHONE ENCOUNTER
Please approve or deny     Last Visit Date:  4/18/2022   ap    Next Visit Date:    Visit date not found

## 2022-09-19 ENCOUNTER — OFFICE VISIT (OUTPATIENT)
Dept: PULMONOLOGY | Age: 67
End: 2022-09-19
Payer: MEDICARE

## 2022-09-19 VITALS
DIASTOLIC BLOOD PRESSURE: 80 MMHG | WEIGHT: 167 LBS | TEMPERATURE: 97.7 F | OXYGEN SATURATION: 96 % | BODY MASS INDEX: 25.31 KG/M2 | SYSTOLIC BLOOD PRESSURE: 116 MMHG | HEIGHT: 68 IN | HEART RATE: 67 BPM

## 2022-09-19 DIAGNOSIS — G47.419 PRIMARY NARCOLEPSY WITHOUT CATAPLEXY: Primary | ICD-10-CM

## 2022-09-19 DIAGNOSIS — F33.41 RECURRENT MAJOR DEPRESSIVE DISORDER, IN PARTIAL REMISSION (HCC): ICD-10-CM

## 2022-09-19 PROCEDURE — 99214 OFFICE O/P EST MOD 30 MIN: CPT | Performed by: PHYSICIAN ASSISTANT

## 2022-09-19 PROCEDURE — G8399 PT W/DXA RESULTS DOCUMENT: HCPCS | Performed by: PHYSICIAN ASSISTANT

## 2022-09-19 PROCEDURE — 1123F ACP DISCUSS/DSCN MKR DOCD: CPT | Performed by: PHYSICIAN ASSISTANT

## 2022-09-19 PROCEDURE — G8427 DOCREV CUR MEDS BY ELIG CLIN: HCPCS | Performed by: PHYSICIAN ASSISTANT

## 2022-09-19 PROCEDURE — G8417 CALC BMI ABV UP PARAM F/U: HCPCS | Performed by: PHYSICIAN ASSISTANT

## 2022-09-19 PROCEDURE — 3017F COLORECTAL CA SCREEN DOC REV: CPT | Performed by: PHYSICIAN ASSISTANT

## 2022-09-19 PROCEDURE — 1090F PRES/ABSN URINE INCON ASSESS: CPT | Performed by: PHYSICIAN ASSISTANT

## 2022-09-19 PROCEDURE — 1036F TOBACCO NON-USER: CPT | Performed by: PHYSICIAN ASSISTANT

## 2022-09-19 RX ORDER — METHYLPHENIDATE HYDROCHLORIDE 10 MG/1
10 TABLET ORAL 2 TIMES DAILY
Qty: 180 TABLET | Refills: 0 | Status: SHIPPED | OUTPATIENT
Start: 2022-09-19 | End: 2022-12-18

## 2022-09-19 RX ORDER — ARMODAFINIL 200 MG/1
200 TABLET ORAL DAILY
Qty: 30 TABLET | Refills: 0 | Status: SHIPPED | OUTPATIENT
Start: 2022-09-19 | End: 2022-10-20

## 2022-09-19 NOTE — PROGRESS NOTES
[facility]                                         [id number]  2022   Chief Complaint   Patient presents with    Follow-up     1 year Narcolepsy follow up, on Ritalin and Nuvigil         Pt of Dr. Uma Cervantes: 19  SAQLI: 47    Presentation:   Les Copeland presents for sleep medicine follow up for narcolepsy  Since the last visit Les Copeland has not been taking Nuvigil the past few months. She had an episode of depression and stopped taking both Nuvigil and Ritalin. She just restarted them recently with benefit. Progress History:   Since last visit any new medical issues? Yes depression  New ER or hospitlal visits? No  Any new or changes in medicines? No  Any new sleep medicines? No    Sleep issues:  Do you feel better? Yes and No  More rested? Sometimes   Better concentration?  yes      Past Medical History:   Diagnosis Date    Asthma     Dr. Ross Callahan    Depression       from [facility] neck    Fibromyalgia     Hyperlipemia     check per Dr Ami Beavers     Insulin resistance     Narcolepsy     per Dr. Ami Beavers via sleep study    Neck pain     cervical stenosis    PLMD (periodic limb movement disorder)     Prolonged emergence from general anesthesia     Tachycardia        Past Surgical History:   Procedure Laterality Date    ANKLE SURGERY Left 2017    OIO    ANKLE SURGERY Left 2020    Replacement and tendon repair    CARPAL TUNNEL RELEASE Right     CERVICAL DISCECTOMY      C3-4, C4-5    CERVICAL FUSION N/A 2019    REMOVAL OF PLATE U7-8, ACDF L9-3, C6-7 WITH PLATING/MEDTRONIC performed by Kacy Hsieh MD at [address]  2/12/15    Dr. Magaly Yin Right 2022    CERVICAL FACET JOINT medial branch blocks  RIGHT Cervical 2- 3  3-4 performed by Perdo Andujar DO at [address] [facility] JOINT INJECTION Right 9/6/2022    medial branch blocks at RIGHT Cervical 2/3 and 3/4 performed by Kalia Prince DO at [facility] [address]  2015    FOOT SURGERY Right 2011    bone removal from foot on rt    FOOT SURGERY Left 2011    bone spur, tarsal tunnel and cyst removal on left    HYSTERECTOMY (CERVIX STATUS UNKNOWN)  1996    total, endometriosis    JOINT REPLACEMENT  2009    bilateral knee    LAPAROSCOPY  2010    for endometriosis    OVARY REMOVAL  1996    TONSILLECTOMY AND ADENOIDECTOMY  2006    UPPER GASTROINTESTINAL ENDOSCOPY  2006       Social History     Tobacco Use    Smoking status: Never    Smokeless tobacco: Never   Vaping Use    Vaping Use: Never used   Substance Use Topics    Alcohol use: Yes     Comment: rarely    Drug use: No       Allergies   Allergen Reactions    Bactrim Other (See Comments)     Metallic taste    Iv Dye [Iodides] Nausea Only and Other (See Comments)     Says older IVP dye causes Stomach pains    Tolectin [Tolmetin Sodium] Other (See Comments)     Metallic taste    Claforan [Cefotaxime Sodium In D5w] Hives and Rash    Pcn [Penicillins] Hives and Rash       Current Outpatient Medications   Medication Sig Dispense Refill    atorvastatin (LIPITOR) 80 MG tablet TAKE 1 TABLET BY MOUTH ONCE DAILY. 90 tablet 1    aspirin 325 MG tablet       montelukast (SINGULAIR) 10 MG tablet TAKE 1 TABLET BY MOUTH NIGHTLY 30 tablet 11    mometasone-formoterol (DULERA) 200-5 MCG/ACT inhaler Inhale 2 puffs into the lungs 2 times daily 13 g 11    baclofen (LIORESAL) 10 MG tablet Take 1 tablet by mouth 3 times daily 60 tablet 0    venlafaxine (EFFEXOR XR) 150 MG extended release capsule TAKE 1 CAPSULE BY MOUTH 2 TIMES DAILY 180 capsule 1    atenolol (TENORMIN) 50 MG tablet TAKE 1 AND 1/2 TABLETS BY MOUTH ONCE DAILY. 135 tablet 1    omeprazole (PRILOSEC) 40 MG delayed release capsule TAKE 1 CAPSULE BY MOUTH IN THE MORNING BEFORE BREAKFAST.  90 capsule 1    metFORMIN (GLUCOPHAGE-XR) 500 MG extended release tablet TAKE 4 TABLETS BY MOUTH IN THE EVENING 360 tablet 1    topiramate (TOPAMAX) 50 MG tablet TAKE 1 TABLET BY MOUTH IN THE MORNING AND TAKE 2 TABLETS BY MOUTH IN THE AFTERNOON 270 tablet 1    levalbuterol (XOPENEX HFA) 45 MCG/ACT inhaler Inhale 2 puffs into the lungs every 6 hours as needed for Wheezing or Shortness of Breath 15 g 11    losartan (COZAAR) 25 MG tablet Take 1 tablet by mouth nightly 90 tablet 1    ARIPiprazole (ABILIFY) 5 MG tablet TAKE 1 TABLET BY MOUTH ONCE DAILY 90 tablet 1    tiZANidine (ZANAFLEX) 4 MG tablet TAKE 1 TABLET BY MOUTH NIGHTLY AT BEDTIME AS NEEDED 90 tablet 1    fluticasone (FLONASE) 50 MCG/ACT nasal spray 1 spray by Nasal route daily 3 Bottle 1    nitroGLYCERIN (NITROSTAT) 0.4 MG SL tablet Place 1 tablet under the tongue every 5 minutes as needed for Chest pain. 25 tablet 3    Multiple Vitamins-Minerals (THERAPEUTIC MULTIVITAMIN-MINERALS) tablet Take 1 tablet by mouth daily       Coenzyme Q10 (CO Q 10) 10 MG CAPS Take 1 capsule by mouth daily as needed       Calcium Carbonate-Vitamin D (CALCIUM + D PO) Take 1,200 mg by mouth daily. LYSINE Take 1,000 mg by mouth 2 times daily as needed       rizatriptan (MAXALT) 10 MG tablet Take 1 tablet by mouth once as needed for Migraine May repeat in 2 hours if needed 36 tablet 1     No current facility-administered medications for this visit.        Family History   Problem Relation Age of Onset    COPD Father     Heart Disease Father 61        ekg changes    Cancer Sister 50        Multiple malaran     Irritable Bowel Syndrome Sister     Colon Cancer Sister 36        diagnosed age 42's     Arthritis Mother 48    COPD Maternal Aunt     Severe Sprains Maternal Uncle         epilepsy     Cancer Paternal Uncle 58        lung    Diabetes Maternal Grandmother     Kidney Disease Maternal Grandmother 72        Failure form DM     Cancer Maternal Grandfather         Mets all over     Coronary Art Dis Paternal Grandfather 35    Heart Disease Paternal Grandfather 35    Heart Attack Paternal Grandfather 35    Cancer Maternal Uncle         Lung     Stroke Maternal Uncle     Diabetes Maternal Uncle         NIDDM     Cancer Maternal Cousin         lung Cancer     Cancer Daughter         Skin Cancer         Review of Systems -   General ROS: stable / unchanged  ENT ROS: negative for - nasal congestion, oral lesions or sore throat  Hematological andLymphatic ROS: negative  Endocrine ROS: negative  Respiratory ROS: no cough, shortness of breath, or wheezing  Cardiovascular ROS: no chest pain or dyspnea on exertion  Gastrointestinal ROS: no abdominal pain,change in bowel habits, or black or bloody stools  Musculoskeletal ROS: negative  Neurological ROS: +depression     Physical Exam:    BMI:  Body mass index is 25.39 kg/m². Wt Readings from Last 3 Encounters:   09/19/22 167 lb (75.8 kg)   09/06/22 169 lb (76.7 kg)   08/15/22 166 lb (75.3 kg)     Vitals: /80   Pulse 67   Temp 97.7 °F (36.5 °C)   Ht 5' 8\" (1.727 m)   Wt 167 lb (75.8 kg)   SpO2 96%   BMI 25.39 kg/m²       Physical Exam  Constitutional:       General: She is not in acute distress. Appearance: Normal appearance. She is normal weight. She is not ill-appearing. HENT:      Head: Normocephalic and atraumatic. Nose: Nose normal.   Eyes:      Extraocular Movements: Extraocular movements intact. Pupils: Pupils are equal, round, and reactive to light. Pulmonary:      Effort: Pulmonary effort is normal.   Neurological:      Mental Status: She is alert and oriented to person, place, and time. Psychiatric:         Attention and Perception: Attention and perception normal.         Mood and Affect: Mood and affect normal.         Speech: Speech normal.         Behavior: Behavior normal.         Thought Content:  Thought content normal.         Cognition and Memory: Cognition and memory normal.         Judgment: Judgment normal. Assessment      Diagnosis Orders   1. Primary narcolepsy without cataplexy        2. Recurrent major depressive disorder, in partial remission ([facility] [address]               Plan   - Depression contributing to EDS   - encouraged to increase activity  - need to follow up with PCP for depression   - She call my officefor earlier appointment if needed for worsening of sleep symptoms.   - She was instructed on weight loss  - Elva Marin was educated about my impression and plan. Patient verbalizes understanding.   We will see Michael harris in: 1 year     Kathie Gong  9/19/2022

## 2022-09-24 DIAGNOSIS — E88.81 METABOLIC SYNDROME: ICD-10-CM

## 2022-09-26 NOTE — TELEPHONE ENCOUNTER
Please approve or deny     Last Visit Date:  4/18/2022   ap      Next Visit Date:    Visit date not found. At last visit it was requested to return in 4 weeks. (Around 5/16/2022).

## 2022-09-27 RX ORDER — LOSARTAN POTASSIUM 25 MG/1
TABLET ORAL
Qty: 90 TABLET | Refills: 1 | Status: SHIPPED | OUTPATIENT
Start: 2022-09-27 | End: 2022-09-29 | Stop reason: SDUPTHER

## 2022-09-27 RX ORDER — OMEPRAZOLE 40 MG/1
CAPSULE, DELAYED RELEASE ORAL
Qty: 90 CAPSULE | Refills: 1 | Status: SHIPPED | OUTPATIENT
Start: 2022-09-27

## 2022-09-27 RX ORDER — METFORMIN HYDROCHLORIDE 500 MG/1
TABLET, EXTENDED RELEASE ORAL
Qty: 360 TABLET | Refills: 1 | Status: SHIPPED | OUTPATIENT
Start: 2022-09-27

## 2022-09-29 ENCOUNTER — TELEPHONE (OUTPATIENT)
Dept: PHYSICAL MEDICINE AND REHAB | Age: 67
End: 2022-09-29

## 2022-09-29 ENCOUNTER — OFFICE VISIT (OUTPATIENT)
Dept: CARDIOLOGY CLINIC | Age: 67
End: 2022-09-29
Payer: MEDICARE

## 2022-09-29 ENCOUNTER — OFFICE VISIT (OUTPATIENT)
Dept: PHYSICAL MEDICINE AND REHAB | Age: 67
End: 2022-09-29
Payer: MEDICARE

## 2022-09-29 VITALS
SYSTOLIC BLOOD PRESSURE: 106 MMHG | HEART RATE: 78 BPM | HEIGHT: 68 IN | RESPIRATION RATE: 18 BRPM | WEIGHT: 164 LBS | DIASTOLIC BLOOD PRESSURE: 68 MMHG | BODY MASS INDEX: 24.86 KG/M2

## 2022-09-29 VITALS
BODY MASS INDEX: 24.86 KG/M2 | SYSTOLIC BLOOD PRESSURE: 128 MMHG | DIASTOLIC BLOOD PRESSURE: 86 MMHG | HEIGHT: 68 IN | WEIGHT: 164 LBS

## 2022-09-29 DIAGNOSIS — M47.812 CERVICAL SPONDYLOSIS: Primary | ICD-10-CM

## 2022-09-29 DIAGNOSIS — M79.2 NEUROPATHIC PAIN: ICD-10-CM

## 2022-09-29 DIAGNOSIS — I10 ESSENTIAL HYPERTENSION: ICD-10-CM

## 2022-09-29 DIAGNOSIS — E78.5 DYSLIPIDEMIA (HIGH LDL; LOW HDL): ICD-10-CM

## 2022-09-29 DIAGNOSIS — E88.81 METABOLIC SYNDROME: ICD-10-CM

## 2022-09-29 DIAGNOSIS — G89.29 OTHER CHRONIC PAIN: ICD-10-CM

## 2022-09-29 DIAGNOSIS — E78.5 HYPERLIPIDEMIA, UNSPECIFIED HYPERLIPIDEMIA TYPE: Primary | ICD-10-CM

## 2022-09-29 DIAGNOSIS — M54.81 OCCIPITAL NEURALGIA OF RIGHT SIDE: ICD-10-CM

## 2022-09-29 PROCEDURE — 99214 OFFICE O/P EST MOD 30 MIN: CPT | Performed by: ANESTHESIOLOGY

## 2022-09-29 PROCEDURE — G8427 DOCREV CUR MEDS BY ELIG CLIN: HCPCS | Performed by: INTERNAL MEDICINE

## 2022-09-29 PROCEDURE — 93000 ELECTROCARDIOGRAM COMPLETE: CPT | Performed by: INTERNAL MEDICINE

## 2022-09-29 PROCEDURE — G8420 CALC BMI NORM PARAMETERS: HCPCS | Performed by: INTERNAL MEDICINE

## 2022-09-29 PROCEDURE — G8399 PT W/DXA RESULTS DOCUMENT: HCPCS | Performed by: INTERNAL MEDICINE

## 2022-09-29 PROCEDURE — 1123F ACP DISCUSS/DSCN MKR DOCD: CPT | Performed by: INTERNAL MEDICINE

## 2022-09-29 PROCEDURE — 1090F PRES/ABSN URINE INCON ASSESS: CPT | Performed by: INTERNAL MEDICINE

## 2022-09-29 PROCEDURE — 3017F COLORECTAL CA SCREEN DOC REV: CPT | Performed by: INTERNAL MEDICINE

## 2022-09-29 PROCEDURE — G8420 CALC BMI NORM PARAMETERS: HCPCS | Performed by: ANESTHESIOLOGY

## 2022-09-29 PROCEDURE — 1036F TOBACCO NON-USER: CPT | Performed by: INTERNAL MEDICINE

## 2022-09-29 PROCEDURE — 1036F TOBACCO NON-USER: CPT | Performed by: ANESTHESIOLOGY

## 2022-09-29 PROCEDURE — G8427 DOCREV CUR MEDS BY ELIG CLIN: HCPCS | Performed by: ANESTHESIOLOGY

## 2022-09-29 PROCEDURE — 3017F COLORECTAL CA SCREEN DOC REV: CPT | Performed by: ANESTHESIOLOGY

## 2022-09-29 PROCEDURE — G8399 PT W/DXA RESULTS DOCUMENT: HCPCS | Performed by: ANESTHESIOLOGY

## 2022-09-29 PROCEDURE — 1090F PRES/ABSN URINE INCON ASSESS: CPT | Performed by: ANESTHESIOLOGY

## 2022-09-29 PROCEDURE — 1123F ACP DISCUSS/DSCN MKR DOCD: CPT | Performed by: ANESTHESIOLOGY

## 2022-09-29 PROCEDURE — 99213 OFFICE O/P EST LOW 20 MIN: CPT | Performed by: INTERNAL MEDICINE

## 2022-09-29 RX ORDER — ATENOLOL 50 MG/1
TABLET ORAL
Qty: 135 TABLET | Refills: 3 | Status: SHIPPED | OUTPATIENT
Start: 2022-09-29

## 2022-09-29 RX ORDER — ATORVASTATIN CALCIUM 80 MG/1
TABLET, FILM COATED ORAL
Qty: 90 TABLET | Refills: 3 | Status: SHIPPED | OUTPATIENT
Start: 2022-09-29 | End: 2022-09-29 | Stop reason: ALTCHOICE

## 2022-09-29 RX ORDER — LOSARTAN POTASSIUM 25 MG/1
TABLET ORAL
Qty: 90 TABLET | Refills: 3 | Status: SHIPPED | OUTPATIENT
Start: 2022-09-29

## 2022-09-29 RX ORDER — NITROGLYCERIN 0.4 MG/1
0.4 TABLET SUBLINGUAL EVERY 5 MIN PRN
Qty: 25 TABLET | Refills: 3 | Status: SHIPPED | OUTPATIENT
Start: 2022-09-29

## 2022-09-29 RX ORDER — ROSUVASTATIN CALCIUM 40 MG/1
40 TABLET, COATED ORAL EVERY EVENING
Qty: 90 TABLET | Refills: 3 | Status: SHIPPED | OUTPATIENT
Start: 2022-09-29

## 2022-09-29 ASSESSMENT — ENCOUNTER SYMPTOMS
TROUBLE SWALLOWING: 0
NAUSEA: 0
SHORTNESS OF BREATH: 0
BLOOD IN STOOL: 0
ANAL BLEEDING: 0
WHEEZING: 0
COUGH: 0
ABDOMINAL PAIN: 0
COLOR CHANGE: 0
CHOKING: 0
VOICE CHANGE: 0
VOMITING: 0
CHEST TIGHTNESS: 0
ABDOMINAL DISTENTION: 0
APNEA: 0
STRIDOR: 0

## 2022-09-29 NOTE — PROGRESS NOTES
[address]  Dept: [address]  Loc: [phone]     2022       Jerry Alert is here today for   Chief Complaint   Patient presents with    Follow-up           Referring Physician:  No ref. provider found     Patient Active Problem List   Diagnosis    Insulin resistance    Hyperlipidemia    Sinus tachycardia    Cervical radiculopathy    H/O class III angina pectoris    Abnormal nuclear stress test    Narcolepsy    PLMD (periodic limb movement disorder)    Hypersomnia    Asthma-COPD overlap syndrome (HCC)    Decreased diffusion capacity of lung       Review of Systems   Constitutional:  Negative for activity change, appetite change, fatigue, fever and unexpected weight change. HENT:  Negative for congestion, trouble swallowing and voice change. Eyes:  Negative for visual disturbance. Respiratory:  Negative for apnea, cough, choking, chest tightness, shortness of breath, wheezing and stridor. Cardiovascular:  Negative for chest pain, palpitations and leg swelling. Gastrointestinal:  Negative for abdominal distention, abdominal pain, anal bleeding, blood in stool, nausea and vomiting. Endocrine: Negative for cold intolerance and heat intolerance. Genitourinary:  Negative for hematuria. Musculoskeletal:  Negative for arthralgias, gait problem, joint swelling and myalgias. Skin:  Negative for color change and rash. Allergic/Immunologic: Negative for environmental allergies and food allergies. Neurological:  Negative for dizziness, tremors, syncope, facial asymmetry, weakness, light-headedness, numbness and headaches. Hematological:  Does not bruise/bleed easily. Psychiatric/Behavioral:  Negative for agitation, behavioral problems and sleep disturbance.        Past Medical History:   Diagnosis Date    Asthma     Dr. Ede David    Depression       from [facility] neck    Fibromyalgia 2000    Hyperlipemia 2015    check per Dr Montez Fernández     Insulin resistance 2011    Narcolepsy 2000    per Dr. Montez Fernández via sleep study    Neck pain 1993    cervical stenosis    PLMD (periodic limb movement disorder)     Prolonged emergence from general anesthesia 2000    Tachycardia 2001       Allergies   Allergen Reactions    Bactrim Other (See Comments)     Metallic taste    Iv Dye [Iodides] Nausea Only and Other (See Comments)     Says older IVP dye causes Stomach pains    Tolectin [Tolmetin Sodium] Other (See Comments)     Metallic taste    Claforan [Cefotaxime Sodium In D5w] Hives and Rash    Pcn [Penicillins] Hives and Rash       Current Outpatient Medications   Medication Sig Dispense Refill    losartan (COZAAR) 25 MG tablet TAKE 1 TABLET BY MOUTH ONCE NIGHTLY. 90 tablet 3    atenolol (TENORMIN) 50 MG tablet ONE AND ONE HALF TAB DAILY 135 tablet 3    nitroGLYCERIN (NITROSTAT) 0.4 MG SL tablet Place 1 tablet under the tongue every 5 minutes as needed for Chest pain 25 tablet 3    rosuvastatin (CRESTOR) 40 MG tablet Take 1 tablet by mouth every evening 90 tablet 3    metFORMIN (GLUCOPHAGE-XR) 500 MG extended release tablet TAKE 4 TABLETS BY MOUTH IN THE EVENING. 360 tablet 1    omeprazole (PRILOSEC) 40 MG delayed release capsule TAKE 1 CAPSULE BY MOUTH IN THE MORNING BEFORE BREAKFAST. 90 capsule 1    methylphenidate (RITALIN) 10 MG tablet Take 1 tablet by mouth 2 times daily for 90 days. 180 tablet 0    Armodafinil (NUVIGIL) 200 MG TABS Take 200 mg by mouth daily for 31 days.  30 tablet 0    aspirin 325 MG tablet       montelukast (SINGULAIR) 10 MG tablet TAKE 1 TABLET BY MOUTH NIGHTLY 30 tablet 11    mometasone-formoterol (DULERA) 200-5 MCG/ACT inhaler Inhale 2 puffs into the lungs 2 times daily 13 g 11    baclofen (LIORESAL) 10 MG tablet Take 1 tablet by mouth 3 times daily 60 tablet 0    venlafaxine (EFFEXOR XR) 150 MG extended release capsule TAKE 1 CAPSULE BY MOUTH 2 TIMES DAILY 180 capsule 1    topiramate (TOPAMAX) 50 MG tablet TAKE 1 TABLET BY MOUTH IN THE MORNING AND TAKE 2 TABLETS BY MOUTH IN THE AFTERNOON 270 tablet 1    levalbuterol (XOPENEX HFA) 45 MCG/ACT inhaler Inhale 2 puffs into the lungs every 6 hours as needed for Wheezing or Shortness of Breath 15 g 11    rizatriptan (MAXALT) 10 MG tablet Take 1 tablet by mouth once as needed for Migraine May repeat in 2 hours if needed 36 tablet 1    ARIPiprazole (ABILIFY) 5 MG tablet TAKE 1 TABLET BY MOUTH ONCE DAILY 90 tablet 1    tiZANidine (ZANAFLEX) 4 MG tablet TAKE 1 TABLET BY MOUTH NIGHTLY AT BEDTIME AS NEEDED 90 tablet 1    fluticasone (FLONASE) 50 MCG/ACT nasal spray 1 spray by Nasal route daily 3 Bottle 1    Multiple Vitamins-Minerals (THERAPEUTIC MULTIVITAMIN-MINERALS) tablet Take 1 tablet by mouth daily       Coenzyme Q10 (CO Q 10) 10 MG CAPS Take 1 capsule by mouth daily as needed       Calcium Carbonate-Vitamin D (CALCIUM + D PO) Take 1,200 mg by mouth daily. LYSINE Take 1,000 mg by mouth 2 times daily as needed        No current facility-administered medications for this visit. Social History     Socioeconomic History    Marital status:       Spouse name: None    Number of children: 4    Years of education: 14    Highest education level: None   Occupational History    Occupation: RN     Employer: [employer]     Comment: [employer]   Tobacco Use    Smoking status: Never    Smokeless tobacco: Never   Vaping Use    Vaping Use: Never used   Substance and Sexual Activity    Alcohol use: Yes     Comment: rarely    Drug use: No    Sexual activity: Not Currently     Social Determinants of Health     Financial Resource Strain: Low Risk     Difficulty of Paying Living Expenses: Not hard at all   Food Insecurity: No Food Insecurity    Worried About Running Out of Food in the Last Year: Never true    Ran Out of Food in the Last Year: Never true       Family History   Problem Relation Age of Onset    COPD Father     Heart Disease Father 61        ekg changes    Cancer Sister 50        Multiple malaran     Irritable Bowel Syndrome Sister     Colon Cancer Sister 36        diagnosed age 42's     Arthritis Mother 48    COPD Maternal Aunt     Severe Sprains Maternal Uncle         epilepsy     Cancer Paternal Uncle 58        lung    Diabetes Maternal Grandmother     Kidney Disease Maternal Grandmother 72        Failure form DM     Cancer Maternal Grandfather         Mets all over     Coronary Art Dis Paternal Grandfather 35    Heart Disease Paternal Grandfather 35    Heart Attack Paternal Grandfather 35    Cancer Maternal Uncle         Lung     Stroke Maternal Uncle     Diabetes Maternal Uncle         NIDDM     Cancer Maternal Cousin         lung Cancer     Cancer Daughter         Skin Cancer        Blood pressure 106/68, pulse 78, resp. rate 18, height 5' 8\" (1.727 m), weight 164 lb (74.4 kg). Physical Exam:    General Appearance: alert and oriented to person, place and time, in no acute distress  Cardiovascular: normal rate, regular rhythm, normal S1 and S2, no murmurs, rubs, clicks, or gallops, distal pulses intact, no carotid bruits, no JVD  Pulmonary/Chest: clear to auscultation bilaterally- no wheezes, rales or rhonchi, normal air movement, no respiratory distress  Abdomen: soft, non-tender, non-distended, normal bowel sounds, no masses   Extremities: no cyanosis, clubbing or edema, pulse   Skin: warm and dry, no rash or erythema  Head: normocephalic and atraumatic  Eyes: pupils equal, round, and reactive to light  Neck: supple and non-tender without mass, no thyromegaly   Musculoskeletal: normal range of motion, no joint swelling, deformity or tenderness  Neurological: alert, oriented, normal speech, no focal findings or movement disorder noted    Lab Data:    Cardiac Enzymes:  No results for input(s): CKTOTAL, CKMB, CKMBINDEX, TROPONINI in the last 72 hours.     CBC:   Lab Results   Component Value Date/Time WBC 10.9 12/13/2021 01:26 PM    RBC 4.99 12/13/2021 01:26 PM    RBC 4.81 04/30/2012 01:05 PM    HGB 14.8 12/13/2021 01:26 PM    HCT 47.6 12/13/2021 01:26 PM     12/13/2021 01:26 PM       CMP:    Lab Results   Component Value Date/Time     12/13/2021 01:26 PM    K 4.5 12/13/2021 01:26 PM     12/13/2021 01:26 PM    CO2 26 12/13/2021 01:26 PM    BUN 9 12/13/2021 01:26 PM    CREATININE 0.9 05/05/2022 09:52 AM    LABGLOM 63 05/05/2022 09:52 AM    GLUCOSE 92 12/13/2021 01:26 PM    GLUCOSE 97 04/30/2012 01:05 PM    CALCIUM 10.0 12/13/2021 01:26 PM       Hepatic Function Panel:    Lab Results   Component Value Date/Time    ALKPHOS 92 12/13/2021 01:26 PM    ALT 15 12/13/2021 01:26 PM    AST 16 12/13/2021 01:26 PM    PROT 6.8 12/13/2021 01:26 PM    BILITOT 0.3 12/13/2021 01:26 PM    BILIDIR <0.2 04/30/2020 01:08 PM    LABALBU 4.5 12/13/2021 01:26 PM    LABALBU 4.7 04/30/2012 01:05 PM       Magnesium:  No results found for: MG    PT/INR:  No results found for: PROTIME, INR    HgBA1c:    Lab Results   Component Value Date/Time    LABA1C 5.6 03/15/2021 11:55 AM       FLP:    Lab Results   Component Value Date/Time    TRIG 299 12/13/2021 01:26 PM    HDL 40 12/13/2021 01:26 PM    LDLCALC 86 12/13/2021 01:26 PM       TSH:    Lab Results   Component Value Date/Time    TSH 0.586 12/13/2021 01:26 PM        Diagnosis Orders   1. Hyperlipidemia, unspecified hyperlipidemia type  32137 - [place] ELECTROCARDIOGRAM, COMPLETE    rosuvastatin (CRESTOR) 40 MG tablet    CBC    Basic Metabolic Panel    Lipid Panel    Hepatic Function Panel      2. Essential hypertension  atenolol (TENORMIN) 50 MG tablet    rosuvastatin (CRESTOR) 40 MG tablet    CBC    Basic Metabolic Panel    Lipid Panel    Hepatic Function Panel      3. Metabolic syndrome  atenolol (TENORMIN) 50 MG tablet    rosuvastatin (CRESTOR) 40 MG tablet    CBC    Basic Metabolic Panel    Lipid Panel    Hepatic Function Panel      4.  Dyslipidemia (high LDL; low HDL) rosuvastatin (CRESTOR) 40 MG tablet    CBC    Basic Metabolic Panel    Lipid Panel    Hepatic Function Panel           Assessment/Plan    Jackeline Mckenzie 78years old lady history of coronary artery disease with prior intervention back in 2015 she is here for 3 indicates she is feeling okay she denies chest pain her cholesterol LDL is still high I exchange her Lipitor to the Crestor 40 mg she was advised about diet and exercise she lost a few pounds she denied PND and orthopnea her EKG showed sinus rhythm incomplete right bundle branch block no change her blood pressure has been under control she has diabetes mellitus which has been under control    The lab work the EKG finding the plan of treatment discussed with her in great details and the medication were reconciled sent to her pharmacy patient will be seen annually she was advised about diet exercise and risk factor notification and patient to seek medical attention should any change in clinical condition    Orders Placed This Encounter   Procedures    CBC     Standing Status:   Future     Standing Expiration Date:   2/74/6857    Basic Metabolic Panel     Standing Status:   Future     Standing Expiration Date:   9/29/2023    Lipid Panel     Standing Status:   Future     Standing Expiration Date:   9/29/2023     Order Specific Question:   Is Patient Fasting?/# of Hours     Answer:   12 hours    Hepatic Function Panel     Standing Status:   Future     Standing Expiration Date:   9/29/2023    91295 - [place] ELECTROCARDIOGRAM, COMPLETE       No follow-ups on file.      Efe Nettles MD

## 2022-09-29 NOTE — PROGRESS NOTES
Chronic Pain/PM&R Clinic Note     Encounter Date: 9/29/22    Subjective:   Chief Complaint:   Chief Complaint   Patient presents with    Follow Up After Procedure     medial branch blocks at RIGHT Cervical 2/3 and 3/4 9/6/22       History of Present Illness:   Jose R Blackburn is a 78 y.o. female seen in the clinic initially on 05/02/22 upon request from Shar Rios MD (PCP) for her history of chronic neck pain and migraines. She has a medical history of 2 previous cervical neck surgeries including a C3/C4 and C7/T1 anterior cervical disc fusions. She has been dealing with migraines ever since she was a teenager. She states that she has been noticing worsening pain on the right side of her neck over the last few months without any inciting event. She states this pain is a constant 5/10 shooting, throbbing, stabbing pain. She states the pain will start at the base of her occiput on the right-hand side and radiate around to the top of her scalp around to her right eye and to her ear. She states that this can be worse with mastication but denies any associated blurry vision, double vision, ringing in the ears, or issues with photophobia or phonophobia. She states that she does have some very intermittent numbness/tingling in her left hand. She denies any worsening balance/gait disturbances. She states that she has been managing her pain with the use of Excedrin, Maxalt, Topamax, baclofen. She states she was close to having Botox done for her migraines but this was never pursued when she had to have her second surgery. 8/15/2022, patient presents for planned follow-up for chronic neck pain and migraines. She underwent right cervical medial branch blocks on 8/2/2022. She reports 100% relief on the right side of her neck and migraines lasting 3-4 days in duration.   He states that she did have some muscle spasms and tightness after her injection wore off that were pretty painful on her lower neck and trapezius region. She would like to proceed with the confirmatory steps of her injection series in order to get to the ablation therapy as previously discussed. Denies any other new symptoms this point is wondering how she can combat some of the muscle tightness and spasms after her procedure. Today, 2022, patient presents for planned follow-up for chronic neck pain and migraines. She underwent confirmatory cervical medial branch blocks on 2022. She reports 100% pain relief in the right side of her neck and her migraine headaches lasting 4 days in duration. She states that she was very happy overall with the relief that He did not have a migraine lasting that long. She denies any changes in her symptoms at this point would like to proceed with the ablation therapy as previously discussed.       History of Interventions:   Surgery: Previous neck surgeries (C3/C4 and C7/T1 ACDF)  Injections: Right occipital nerve block (22) - 50-60% relief x 6 weeks  RIGHT cervical MBBs (22) - 100% relief x 3-4 days   Right confimatory cervical MBBs (2022)-100% relief x4 days    Imaging/Studies:  MRI C-spine         Past Medical History:   Diagnosis Date    Asthma     Dr. Jacklyn Mckeon    Depression 2000      from [facility] neck    Fibromyalgia     Hyperlipemia     check per Dr Montez Fernández     Insulin resistance     Narcolepsy 2000    per Dr. Montez Fernández via sleep study    Neck pain     cervical stenosis    PLMD (periodic limb movement disorder)     Prolonged emergence from general anesthesia 2000    Tachycardia        Past Surgical History:   Procedure Laterality Date    ANKLE SURGERY Left 2017    OIO    ANKLE SURGERY Left 2020    Replacement and tendon repair    CARPAL TUNNEL RELEASE Right 2011    CERVICAL DISCECTOMY  2012    C3-4, C4-5    CERVICAL FUSION N/A 2019    REMOVAL OF PLATE W6-1, ACDF Q6-3, C6-7 WITH PLATING/MEDTRONIC performed by Desi Pérez MD at [facility] COLONOS[facility]  2006    CORONARY ANGIOPLASTY WITH STENT PLACEMENT  2/12/15    Dr. Delma Feldman Right 8/2/2022    CERVICAL FACET JOINT medial branch blocks  RIGHT Cervical 2- 3  3-4 performed by Benita Alvarado DO at [facility] INJECTION Right 9/6/2022    medial branch blocks at RIGHT Cervical 2/3 and 3/4 performed by Benita Alvarado DO at [facility]  2015    FOOT SURGERY Right 2011    bone removal from foot on rt    FOOT SURGERY Left 2011    bone spur, tarsal tunnel and cyst removal on left    HYSTERECTOMY (CERVIX STATUS UNKNOWN)  1996    total, endometriosis    JOINT REPLACEMENT  2009    bilateral knee    LAPAROSCOPY  2010    for endometriosis    OVARY REMOVAL  1996    TONSILLECTOMY AND ADENOIDECTOMY  2006    UPPER GASTROINTESTINAL ENDOSCOPY  2006       Family History   Problem Relation Age of Onset    COPD Father     Heart Disease Father 61        ekg changes    Cancer Sister 50        Multiple malaran     Irritable Bowel Syndrome Sister     Colon Cancer Sister 36        diagnosed age 42's     Arthritis Mother 48    COPD Maternal Aunt     Severe Sprains Maternal Uncle         epilepsy     Cancer Paternal Uncle 58        lung    Diabetes Maternal Grandmother     Kidney Disease Maternal Grandmother 72        Failure form DM     Cancer Maternal Grandfather         Mets all over     Coronary Art Dis Paternal Grandfather 35    Heart Disease Paternal Grandfather 35    Heart Attack Paternal Grandfather 35    Cancer Maternal Uncle         Lung     Stroke Maternal Uncle     Diabetes Maternal Uncle         NIDDM     Cancer Maternal Cousin         lung Cancer     Cancer Daughter         Skin Cancer          Medications & Allergies:   Current Outpatient Medications   Medication Instructions    ARIPiprazole (ABILIFY) 5 MG tablet TAKE 1 TABLET BY MOUTH ONCE DAILY    Armodafinil (NUVIGIL) 200 mg, Oral, DAILY    aspirin 325 MG tablet No dose, route, or frequency recorded. atenolol (TENORMIN) 50 MG tablet ONE AND ONE HALF TAB DAILY    baclofen (LIORESAL) 10 mg, Oral, 3 TIMES DAILY    Calcium Carbonate-Vitamin D (CALCIUM + D PO) 1,200 mg, DAILY    Coenzyme Q10 (CO Q 10) 10 MG CAPS 1 capsule, Oral, DAILY PRN    fluticasone (FLONASE) 50 MCG/ACT nasal spray 1 spray, Nasal, DAILY    levalbuterol (XOPENEX HFA) 45 MCG/ACT inhaler 2 puffs, Inhalation, EVERY 6 HOURS PRN    losartan (COZAAR) 25 MG tablet TAKE 1 TABLET BY MOUTH ONCE NIGHTLY.     LYSINE 1,000 mg, Oral, 2 TIMES DAILY PRN    metFORMIN (GLUCOPHAGE-XR) 500 MG extended release tablet TAKE 4 TABLETS BY MOUTH IN THE EVENING.    methylphenidate (RITALIN) 10 mg, Oral, 2 TIMES DAILY    mometasone-formoterol (DULERA) 200-5 MCG/ACT inhaler 2 puffs, Inhalation, 2 TIMES DAILY    montelukast (SINGULAIR) 10 mg, Oral, NIGHTLY    Multiple Vitamins-Minerals (THERAPEUTIC MULTIVITAMIN-MINERALS) tablet 1 tablet, Oral, DAILY    nitroGLYCERIN (NITROSTAT) 0.4 mg, SubLINGual, EVERY 5 MIN PRN    omeprazole (PRILOSEC) 40 MG delayed release capsule TAKE 1 CAPSULE BY MOUTH IN THE MORNING BEFORE BREAKFAST.    rizatriptan (MAXALT) 10 mg, Oral, ONCE PRN, May repeat in 2 hours if needed    rosuvastatin (CRESTOR) 40 mg, Oral, EVERY EVENING    tiZANidine (ZANAFLEX) 4 MG tablet TAKE 1 TABLET BY MOUTH NIGHTLY AT BEDTIME AS NEEDED    topiramate (TOPAMAX) 50 MG tablet TAKE 1 TABLET BY MOUTH IN THE MORNING AND TAKE 2 TABLETS BY MOUTH IN THE AFTERNOON    venlafaxine (EFFEXOR XR) 150 MG extended release capsule TAKE 1 CAPSULE BY MOUTH 2 TIMES DAILY       Allergies   Allergen Reactions    Bactrim Other (See Comments)     Metallic taste    Iv Dye [Iodides] Nausea Only and Other (See Comments)     Says older IVP dye causes Stomach pains    Tolectin [Tolmetin Sodium] Other (See Comments)     Metallic taste    Claforan [Cefotaxime Sodium In D5w] Hives and Rash    Pcn [Penicillins] Hives and Rash       Review of Systems:   Constitutional: negative for weight changes or fevers  Genitourinary: negative for bowel/bladder incontinence   Musculoskeletal: positive for neck pain  Neurological: positive for migraines but negative for any arm weakness  Behavioral/Psych: negative for anxiety/depression   All other systems reviewed and are negative    Objective:     Vitals:    09/29/22 1452   BP: 128/86       Constitutional: Pleasant, no acute distress   Head: Normocephalic, atraumatic   Eyes: Conjunctivae normal   Neck: Supple, symmetrical   Respiration: Non-labored breathing   Cardiovascular: Limbs warm and well perfused   Musculoskeletal: Reduced cervical ROM in all planes. Negative Spurling maneuver bilaterally. Increased pain with facet loading on RIGHT. Tenderness to palpation in RIGHT cervical paraspinals. Neuro: Alert, oriented. CN II-XII appear grossly intact. No focal upper limbs deficits. Skin: healed anterior neck incision scar  Psychological: Cooperative, no exaggerated pain behaviors         Assessment:    Diagnosis Orders   1. Cervical spondylosis  CHG FLUOR NEEDLE/CATH SPINE/PARASPINAL DX/THER ADDON    MN DSTR NROLYTC AGNT PARVERTEB FCT SNGL CRVCL/THORA    MN DSTR NROLYTC AGNT PARVERTEB FCT ADDL CRVCL/THORA      2. Occipital neuralgia of right side        3. Neuropathic pain        4. Other chronic pain                  Peggy Corona is a 78 y. o.female presenting to the pain clinic for evaluation of chronic right-sided neck pain and migraines. Her clinical history and physical examination are consistent with occipital neuralgia in both the greater and lesser occipital nerve distribution on the right. She underwent a right occipital nerve block in clinic. We will follow-up in 6 weeks. We may potentially pursue Botox injections versus right C2/C3 (third occipital nerve) block if she fails to respond. She had a good response to her occipital nerve block.   He has responded well to her cervical medial branch blocks on the right side targeting C2/C3 and C3/C4 facet joints. I set her up for thermal radiofrequency ablation targeting these levels. We will need clearance to hold her aspirin from her cardiology team.      Plan: The following treatment recommendations and plan were discussed in detail with Julio César Michael. Imaging/Studies/Labs:   I have reviewed patients imaging of MRI C-spine and results were discussed with patient today. Analgesics:   Patient is taking Excedrin as needed. Patient is advised to take as prescribed and not take on an empty stomach. Adjuvants: In light of the presence of a neuropathic component of pain, patient can continue Topamax twice daily. Interventions: In presence of cervical neck pain and migraines and with physical exam consistent for facetal pain and significance positive diagnostic and confirmatory medial branch blocks, thermal radiofrequency ablation targeting medial branches innervating RIGHT C2/C3 and C3/C4 was chosen. The risks and benefits were discussed in detail with the patient. Patient wants to proceed with the injection. Anticoagulation/NPO Recommendations:   Patient will need to hold Aspirin x 6 days (need medical clearance) prior to the procedure. Patient will need to be NPO x 8 hours prior to the procedure. Plan to start an IV prior to the procedure. Multidisciplinary Pain Management:   In the presence of complex, chronic, and multi-factorial pain, the importance of a multidisciplinary approach to pain management in the patients management regimen was emphasized and discussed in great detail.      Referrals:  None    Prescriptions Written This Visit:   None    Follow-up: RIGHT cervical RFA      Derik Valery Councilman, DO  Interventional Pain Management/PM&R   [place]

## 2022-10-10 ENCOUNTER — HOSPITAL ENCOUNTER (OUTPATIENT)
Dept: PULMONOLOGY | Age: 67
Discharge: HOME | End: 2022-10-10
Payer: MEDICARE

## 2022-10-10 DIAGNOSIS — J44.9 ASTHMA-COPD OVERLAP SYNDROME (HCC): ICD-10-CM

## 2022-10-10 DIAGNOSIS — R94.2 DECREASED DIFFUSION CAPACITY OF LUNG: ICD-10-CM

## 2022-10-10 PROCEDURE — 94060 EVALUATION OF WHEEZING: CPT

## 2022-10-10 NOTE — PROGRESS NOTES
Prescreening performed prior to testing. The following symptoms may indicate COVID-19 infection:        One of the following criteria:   Temperature taken, patient temperature was 97.2 F. Fever greater 100.0 F -no  New onset cough -  no  New onset shortness of breath -no  New onset difficulty breathing -no        And/or   Two or more of the following criteria:  New onset muscle aches -no  New onset headache -no  New onset sore throat -no  New onset loss of smell/taste -no  New onset diarrhea -no    Patient's screening was negative. PFT will be performed.

## 2022-10-10 NOTE — PROGRESS NOTES
[facility]    Patient: Carlotta Pérez, 78 y.o.   : 1955  10/17/2022    Patient of Dr. Aubrey Reich   Patient presents with    Follow-up     1 year with Wendy Carter is here for follow up for Asthma-COPD overlap. Patient is here with spirometry that revealed stable spirometry. Overall patient reports respiratory symptoms have been stable since last appointment. Patient reports good compliance with inhaled medications Abrahan Perry). Patient has not required albuterol in over 1 month. Patient reports physical limitation due to respiratory symptoms. Her past medical history is significant for Asthma, neck pain, insulin resistance, tachycardia, depression, fibromyalgia, narcolepsy (follows [facility]), and hyperlipidemia. [facility] is costing patient $180 monthly. Asthma control (severity) questionnaire:  Asthma symptoms:  > 2 times per week:  Yes  Nighttime awakenings: > 2 times per month: No  Use of ILAN for symptoms control (other than pre-exercise use):> 2 times per week: No  Interference with normal activity: No  Lung function: FEV1 >60% predicted: Yes    Number of exacerbations per year: > 2: No    Asthma  She complains of chest tightness (\"rarely\"), cough (\"at times\") and shortness of breath. There is no sputum production or wheezing. The cough is non-productive. Associated symptoms include dyspnea on exertion, headaches (Migraine - following with pain clinic) and rhinorrhea. Pertinent negatives include no appetite change, chest pain, fever, postnasal drip or sneezing. Her symptoms are aggravated by climbing stairs and strenuous activity (pushing lawnmower). Her symptoms are alleviated by beta-agonist. She reports significant improvement on treatment. Her past medical history is significant for asthma. Progress History:   Since last visit any new medical issues?  Yes migraines following with pain clinic and night sweats past few nights  New ER or hospital visits? No  Any new or changes in medicines? No  Using inhalers? Yes Dulera and as needed Xopenex inhaler  Are they helpful? Yes   Previous inhalers? Albuterol - fast heart rate   Any recent exacerbations? No  Last A1AT: MM  Last PFT: 10/10/22 - mild obstruction  Last 6 MWT: None in Epic     Smoking History:  Never smoker  Admits to passive tobacco exposure from parents or work environment. She reports that one of her parents smoked. Social History:  Patient job history: Retired in August, was an RN at [employer]  She has not had exposure to aerosolized particles or hazardous fumes. (Coal, dust, asbestos, molds ie Hay)  [place]. Admits to exposure to pets/animals at home. 1 dog and 2 cats  Denies exposure to tuberculosis.      Flu vaccine: 10/3/2022  Pneumonia vaccine: Balinda Tono 2015 & [id number] 2005  COVID-19 vaccine: Vaccinated  Past Medical hx   PMH:  Past Medical History:   Diagnosis Date    Asthma     Dr. Aquino Case    Depression 2000      from [facility] neck    Fibromyalgia     Hyperlipemia     check per Dr Acacia Zarco     Insulin resistance     Narcolepsy     per Dr. Acacia Zarco via sleep study    Neck pain     cervical stenosis    PLMD (periodic limb movement disorder)     Prolonged emergence from general anesthesia     Tachycardia      SURGICAL HISTORY:  Past Surgical History:   Procedure Laterality Date    ANKLE SURGERY Left 2017    OIO    ANKLE SURGERY Left 2020    Replacement and tendon repair    CARPAL TUNNEL RELEASE Right     CERVICAL DISCECTOMY  2012    C3-4, C4-5    CERVICAL FUSION N/A 2019    REMOVAL OF PLATE B7-1, ACDF X4-7, C6-7 WITH PLATING/MEDTRONIC performed by Marjan Lamb MD at [address]  2/12/15    Dr. Aldana Alejandra Right 2022    CERVICAL FACET JOINT medial branch blocks  RIGHT Cervical 2- 3  3-4 performed by Katheleen Hamman, DO at [address] INJECTION Right 9/6/2022    medial branch blocks at RIGHT Cervical 2/3 and 3/4 performed by Katheleen Hamman, DO at [address]  2015    FOOT SURGERY Right 2011    bone removal from foot on rt    FOOT SURGERY Left 2011    bone spur, tarsal tunnel and cyst removal on left    HYSTERECTOMY (CERVIX STATUS UNKNOWN)  1996    total, endometriosis    JOINT REPLACEMENT  2009    bilateral knee    LAPAROSCOPY  2010    for endometriosis    OVARY REMOVAL  1996    TONSILLECTOMY AND ADENOIDECTOMY  2006    UPPER GASTROINTESTINAL ENDOSCOPY  2006     SOCIAL HISTORY:  Social History     Tobacco Use    Smoking status: Never    Smokeless tobacco: Never   Vaping Use    Vaping Use: Never used   Substance Use Topics    Alcohol use: Yes     Comment: rarely    Drug use: No     ALLERGIES:  Allergies   Allergen Reactions    Bactrim Other (See Comments)     Metallic taste    Iv Dye [Iodides] Nausea Only and Other (See Comments)     Says older IVP dye causes Stomach pains    Tolectin [Tolmetin Sodium] Other (See Comments)     Metallic taste    Claforan [Cefotaxime Sodium In D5w] Hives and Rash    Pcn [Penicillins] Hives and Rash     FAMILY HISTORY:  Family History   Problem Relation Age of Onset    COPD Father     Heart Disease Father 61        ekg changes    Cancer Sister 50        Multiple malaran     Irritable Bowel Syndrome Sister     Colon Cancer Sister 36        diagnosed age 42's     Arthritis Mother 48    COPD Maternal Aunt     Severe Sprains Maternal Uncle         epilepsy     Cancer Paternal Uncle 58        lung    Diabetes Maternal Grandmother     Kidney Disease Maternal Grandmother 72        Failure form DM     Cancer Maternal Grandfather         Mets all over     Coronary Art Dis Paternal Grandfather 35    Heart Disease Paternal Grandfather 35    Heart Attack Paternal Grandfather 35    Cancer Maternal Uncle         Lung     Stroke Maternal Uncle     Diabetes Maternal Uncle         NIDDM     Cancer Maternal Cousin         lung Cancer     Cancer Daughter         Skin Cancer      CURRENT MEDICATIONS:  Current Outpatient Medications   Medication Sig Dispense Refill    levalbuterol (XOPENEX HFA) 45 MCG/ACT inhaler Inhale 2 puffs into the lungs every 6 hours as needed for Wheezing or Shortness of Breath 15 g 11    losartan (COZAAR) 25 MG tablet TAKE 1 TABLET BY MOUTH ONCE NIGHTLY. 90 tablet 3    atenolol (TENORMIN) 50 MG tablet ONE AND ONE HALF TAB DAILY 135 tablet 3    nitroGLYCERIN (NITROSTAT) 0.4 MG SL tablet Place 1 tablet under the tongue every 5 minutes as needed for Chest pain 25 tablet 3    rosuvastatin (CRESTOR) 40 MG tablet Take 1 tablet by mouth every evening 90 tablet 3    metFORMIN (GLUCOPHAGE-XR) 500 MG extended release tablet TAKE 4 TABLETS BY MOUTH IN THE EVENING. 360 tablet 1    omeprazole (PRILOSEC) 40 MG delayed release capsule TAKE 1 CAPSULE BY MOUTH IN THE MORNING BEFORE BREAKFAST. 90 capsule 1    methylphenidate (RITALIN) 10 MG tablet Take 1 tablet by mouth 2 times daily for 90 days. 180 tablet 0    Armodafinil (NUVIGIL) 200 MG TABS Take 200 mg by mouth daily for 31 days.  30 tablet 0    aspirin 325 MG tablet       montelukast (SINGULAIR) 10 MG tablet TAKE 1 TABLET BY MOUTH NIGHTLY 30 tablet 11    mometasone-formoterol (DULERA) 200-5 MCG/ACT inhaler Inhale 2 puffs into the lungs 2 times daily 13 g 11    baclofen (LIORESAL) 10 MG tablet Take 1 tablet by mouth 3 times daily 60 tablet 0    venlafaxine (EFFEXOR XR) 150 MG extended release capsule TAKE 1 CAPSULE BY MOUTH 2 TIMES DAILY 180 capsule 1    topiramate (TOPAMAX) 50 MG tablet TAKE 1 TABLET BY MOUTH IN THE MORNING AND TAKE 2 TABLETS BY MOUTH IN THE AFTERNOON 270 tablet 1    ARIPiprazole (ABILIFY) 5 MG tablet TAKE 1 TABLET BY MOUTH ONCE DAILY 90 tablet 1    tiZANidine (ZANAFLEX) 4 MG tablet TAKE 1 TABLET BY MOUTH NIGHTLY AT BEDTIME AS NEEDED 90 tablet 1    fluticasone (FLONASE) 50 MCG/ACT nasal spray 1 spray by Nasal route daily 3 Bottle 1    Multiple Vitamins-Minerals (THERAPEUTIC MULTIVITAMIN-MINERALS) tablet Take 1 tablet by mouth daily       Coenzyme Q10 (CO Q 10) 10 MG CAPS Take 1 capsule by mouth daily as needed       Calcium Carbonate-Vitamin D (CALCIUM + D PO) Take 1,200 mg by mouth daily. LYSINE Take 1,000 mg by mouth 2 times daily as needed       rizatriptan (MAXALT) 10 MG tablet Take 1 tablet by mouth once as needed for Migraine May repeat in 2 hours if needed 36 tablet 1     No current facility-administered medications for this visit. Chary TILLEY   Review of Systems   Constitutional:  Negative for appetite change, fever and unexpected weight change. Night sweats at night   HENT:  Positive for rhinorrhea. Negative for congestion, postnasal drip, sinus pressure, sinus pain and sneezing. Eyes:  Positive for itching. Respiratory:  Positive for cough (\"at times\") and shortness of breath. Negative for sputum production, chest tightness and wheezing. Cardiovascular:  Positive for dyspnea on exertion. Negative for chest pain, palpitations and leg swelling. Following with Dr. Yani Levin   Allergic/Immunologic: Positive for environmental allergies. As needed over the counter Zyrtec allergy medication   Neurological:  Positive for headaches (Migraine - following with pain clinic). Physical exam   /76 (Site: Right Upper Arm, Position: Sitting, Cuff Size: Medium Adult)   Pulse 78   Temp 98.1 °F (36.7 °C) (Skin)   Ht 5' 8\" (1.727 m)   Wt 166 lb 9.6 oz (75.6 kg)   SpO2 99%   BMI 25.33 kg/m²    Wt Readings from Last 3 Encounters:   10/17/22 166 lb 9.6 oz (75.6 kg)   09/29/22 164 lb (74.4 kg)   09/29/22 164 lb (74.4 kg)       Physical Exam  Constitutional:       General: She is not in acute distress. Comments: BMI 25   HENT:      Head: Normocephalic and atraumatic.       Right Ear: External ear normal. Left Ear: External ear normal.      Mouth/Throat:      Mouth: Mucous membranes are moist.      Pharynx: No oropharyngeal exudate or posterior oropharyngeal erythema. Eyes:      General:         Right eye: No discharge. Left eye: No discharge. Cardiovascular:      Rate and Rhythm: Normal rate and regular rhythm. Pulmonary:      Effort: Pulmonary effort is normal. No respiratory distress. Breath sounds: No wheezing, rhonchi or rales. Chest:      Chest wall: No tenderness. Musculoskeletal:      Cervical back: Neck supple. Right lower leg: No edema. Left lower leg: No edema. Skin:     General: Skin is warm and dry. Neurological:      General: No focal deficit present. Mental Status: She is alert. Psychiatric:         Mood and Affect: Mood normal.         Behavior: Behavior normal.         Thought Content: Thought content normal.        Results   Lung Nodule Screening     [] Qualifies    [x] Does not qualify   [] Declined    [] Completed  Never smoker   The USPSTF recommends annual screening for lung cancer with low-dose computed tomography (LDCT) in adults aged 48 to 80 years who have a 20 pack-year smoking history and currently smoke or have quit within the past 15 years. Screening should be discontinued once a person has not smoked for 15 years or develops a health problem that substantially limits life expectancy or the ability or willingness to have curative lung surgery. Assessment      Diagnosis Orders   1. Decreased diffusion capacity of lung        2. Asthma-COPD overlap syndrome (HCC)  levalbuterol (XOPENEX HFA) 45 MCG/ACT inhaler            Plan   1. Asthma-COPD overlap syndrome ([facility] [address]  -Symptoms are well controlled on Dulera, however patient has difficulty with cost of inhaler. Advised patient to call insurance company to see what inhaler regimen may be covered better and to call the office to notify us what she finds.   We will continue [facility] for now, rinse and spit after use  -Continue levalbuterol (XOPENEX HFA) 45 MCG/ACT inhaler; Inhale 2 puffs into the lungs every 6 hours as needed for Wheezing or Shortness of Breath  -Maintain pneumonia vaccine with primary care provider  -Received flu vaccine  -Received COVID-19 vaccination    2.  Decreased diffusion capacity of lung  -Will plan to repeat DLCO in 1 year to monitor for stability  -Stable spirometry  -Normal A1A screen    Advised patient to call office with any changes, questions, or concerns regarding respiratory status or issues with prescribed medications    1 year follow up     Electronically signed by JENNIFER Carney CNP on 10/17/2022 at 3:07 PM     (Please note that portions of this note may have been completed with a voice recognition program. Efforts were made to edit the dictation but occasionally words are mis-transcribed)

## 2022-10-13 RX ORDER — LOSARTAN POTASSIUM 25 MG/1
TABLET ORAL
Qty: 90 TABLET | Refills: 1 | OUTPATIENT
Start: 2022-10-13

## 2022-10-17 ENCOUNTER — OFFICE VISIT (OUTPATIENT)
Dept: PULMONOLOGY | Age: 67
End: 2022-10-17
Payer: MEDICARE

## 2022-10-17 VITALS
TEMPERATURE: 98.1 F | WEIGHT: 166.6 LBS | HEIGHT: 68 IN | SYSTOLIC BLOOD PRESSURE: 122 MMHG | HEART RATE: 78 BPM | DIASTOLIC BLOOD PRESSURE: 76 MMHG | OXYGEN SATURATION: 99 % | BODY MASS INDEX: 25.25 KG/M2

## 2022-10-17 DIAGNOSIS — J44.9 ASTHMA-COPD OVERLAP SYNDROME (HCC): Primary | ICD-10-CM

## 2022-10-17 DIAGNOSIS — R94.2 DECREASED DIFFUSION CAPACITY OF LUNG: ICD-10-CM

## 2022-10-17 PROCEDURE — G8417 CALC BMI ABV UP PARAM F/U: HCPCS

## 2022-10-17 PROCEDURE — 3023F SPIROM DOC REV: CPT

## 2022-10-17 PROCEDURE — 1036F TOBACCO NON-USER: CPT

## 2022-10-17 PROCEDURE — 1123F ACP DISCUSS/DSCN MKR DOCD: CPT

## 2022-10-17 PROCEDURE — G8484 FLU IMMUNIZE NO ADMIN: HCPCS

## 2022-10-17 PROCEDURE — G8399 PT W/DXA RESULTS DOCUMENT: HCPCS

## 2022-10-17 PROCEDURE — 1090F PRES/ABSN URINE INCON ASSESS: CPT

## 2022-10-17 PROCEDURE — 99214 OFFICE O/P EST MOD 30 MIN: CPT

## 2022-10-17 PROCEDURE — 3017F COLORECTAL CA SCREEN DOC REV: CPT

## 2022-10-17 PROCEDURE — G8427 DOCREV CUR MEDS BY ELIG CLIN: HCPCS

## 2022-10-17 RX ORDER — LEVALBUTEROL TARTRATE 45 UG/1
2 AEROSOL, METERED ORAL EVERY 6 HOURS PRN
Qty: 15 G | Refills: 11 | Status: SHIPPED | OUTPATIENT
Start: 2022-10-17 | End: 2023-10-17

## 2022-10-17 ASSESSMENT — ENCOUNTER SYMPTOMS
SHORTNESS OF BREATH: 1
EYE ITCHING: 1
SINUS PRESSURE: 0
SINUS PAIN: 0
COUGH: 1
CHEST TIGHTNESS: 1
WHEEZING: 0
RHINORRHEA: 1
SPUTUM PRODUCTION: 0
CHEST TIGHTNESS: 0

## 2022-10-17 NOTE — PROGRESS NOTES
Patient is experiencing SOB: no    Patient is experiencing wheezing: No    Patient states they have had no cough. Phlegm is none    Patient is coughing up blood: no    Patient has been experiencing chest pains: chest tightness, rarely    Patient is currently taking the following inhaler(s): xopenex, dulera    Patient is currently taking the following nebulizer treatment(s): none    Patient is using their rescue inhaler not often.     Other: [place] is $180/month

## 2022-10-20 ENCOUNTER — TELEPHONE (OUTPATIENT)
Dept: PULMONOLOGY | Age: 67
End: 2022-10-20

## 2022-10-20 DIAGNOSIS — J44.9 ASTHMA-COPD OVERLAP SYNDROME (HCC): Primary | ICD-10-CM

## 2022-10-20 NOTE — TELEPHONE ENCOUNTER
Pt is calling and said her insurance company gave her 3 inhalers that are less expensive and they are,  Advair, brio,  &symbacourt.   Please advise pt at [phone] room air

## 2022-10-25 RX ORDER — BUDESONIDE AND FORMOTEROL FUMARATE DIHYDRATE 160; 4.5 UG/1; UG/1
2 AEROSOL RESPIRATORY (INHALATION) 2 TIMES DAILY
Qty: 10 G | Refills: 11 | Status: SHIPPED | OUTPATIENT
Start: 2022-10-25 | End: 2023-10-25

## 2022-10-25 NOTE — TELEPHONE ENCOUNTER
Patient calling in to check on this, she is now out of her dulera which she can not afford. [facility], please advise.

## 2022-10-27 ENCOUNTER — PREP FOR PROCEDURE (OUTPATIENT)
Dept: PHYSICAL MEDICINE AND REHAB | Age: 67
End: 2022-10-27

## 2022-10-31 NOTE — DISCHARGE INSTRUCTIONS

## 2022-11-01 NOTE — H&P
Today, patient presents for planned thermal radiofrequency ablation targeting medial branches innervating RIGHT C2/C3 and C3/C4    This note is reflective of the patient's previous visit for evaluation. We will proceed with today's planned procedure. Since patient's last visit for evaluation, there have been no interval changes in medical history. Patient has no new numbness, weakness, or focal neurological deficit since evaluation. Patient has no contraindications to injection (no anticoagulation or recent antibiotic intake for active infections), and has a  present or is able to drive themselves (as discussed and cleared by physician). Allergies to latex, contrast dye, and steroid medications have been confirmed with the patient prior to the procedure. NPO necessity has been assessed and accepted based on procedure complexity. The risks and benefits of the procedure have been explained including but are not limited to infection, bleeding, paralysis, immediate post procedure weakness, and dizziness; the patient acknowledges understanding and desires to proceed with the procedure. Patient has signed consent for same procedure as discussed in previous clinic encounter. All other questions and concerns were addressed at bedside. See procedure note for full details. Post procedure Instructions: The patient was advised not to drive during the day of the procedure and not to engage in any significant decision making (unless otherwise states by physician). The patient was also advised to be cautious with walking/activity for 24 hours following today's visit and asked not to engage in over-exertion (unless otherwise states by physician). After this time, it is ok to resume pre-procedure level of activity. Patient advised to apply ice to site of injection in situations of pain and discomfort.  Patient advised to not submerge site of injection during bath or pool activities for approximately 24 hours post-procedure. Patient attested to understanding post procedure directions / restrictions. All other questions and concerns addressed before patient discharge in ambulatory fashion. Chronic Pain/PM&R Clinic Note     Encounter Date: 9/29/22    Subjective:   Chief Complaint:   No chief complaint on file. History of Present Illness:   Herve Bentley is a 78 y.o. female seen in the clinic initially on 05/02/22 upon request from Germain Garnica MD (PCP) for her history of chronic neck pain and migraines. She has a medical history of 2 previous cervical neck surgeries including a C3/C4 and C7/T1 anterior cervical disc fusions. She has been dealing with migraines ever since she was a teenager. She states that she has been noticing worsening pain on the right side of her neck over the last few months without any inciting event. She states this pain is a constant 5/10 shooting, throbbing, stabbing pain. She states the pain will start at the base of her occiput on the right-hand side and radiate around to the top of her scalp around to her right eye and to her ear. She states that this can be worse with mastication but denies any associated blurry vision, double vision, ringing in the ears, or issues with photophobia or phonophobia. She states that she does have some very intermittent numbness/tingling in her left hand. She denies any worsening balance/gait disturbances. She states that she has been managing her pain with the use of Excedrin, Maxalt, Topamax, baclofen. She states she was close to having Botox done for her migraines but this was never pursued when she had to have her second surgery. 8/15/2022, patient presents for planned follow-up for chronic neck pain and migraines. She underwent right cervical medial branch blocks on 8/2/2022. She reports 100% relief on the right side of her neck and migraines lasting 3-4 days in duration.   He states that she did have some muscle spasms and tightness after her injection wore off that were pretty painful on her lower neck and trapezius region. She would like to proceed with the confirmatory steps of her injection series in order to get to the ablation therapy as previously discussed. Denies any other new symptoms this point is wondering how she can combat some of the muscle tightness and spasms after her procedure. Today, 2022, patient presents for planned follow-up for chronic neck pain and migraines. She underwent confirmatory cervical medial branch blocks on 2022. She reports 100% pain relief in the right side of her neck and her migraine headaches lasting 4 days in duration. She states that she was very happy overall with the relief that He did not have a migraine lasting that long. She denies any changes in her symptoms at this point would like to proceed with the ablation therapy as previously discussed.       History of Interventions:   Surgery: Previous neck surgeries (C3/C4 and C7/T1 ACDF)  Injections: Right occipital nerve block (22) - 50-60% relief x 6 weeks  RIGHT cervical MBBs (22) - 100% relief x 3-4 days   Right confimatory cervical MBBs (2022)-100% relief x4 days    Imaging/Studies:  MRI C-spine         Past Medical History:   Diagnosis Date    Asthma     Dr. Aziza Fish    Depression 2000      from [facility] neck    Fibromyalgia     Hyperlipemia     check per Dr Ania Valdovinos     Insulin resistance     Narcolepsy     per Dr. Ania Valdovinos via sleep study    Neck pain     cervical stenosis    PLMD (periodic limb movement disorder)     Prolonged emergence from general anesthesia     Tachycardia        Past Surgical History:   Procedure Laterality Date    ANKLE SURGERY Left 2017    OIO    ANKLE SURGERY Left 2020    Replacement and tendon repair    CARPAL TUNNEL RELEASE Right 2011    CERVICAL DISCECTOMY  2012    C3-4, C4-5    CERVICAL FUSION N/A 2019    REMOVAL OF PLATE C4-5, ACDF C3-4, C6-7 WITH PLATING/MEDTRONIC performed by Darryl Garcia MD at [place] [address]  2006    CORONARY ANGIOPLASTY WITH STENT PLACEMENT  2/12/15    Dr. Nanda Gutierrez Right 8/2/2022    CERVICAL FACET JOINT medial branch blocks  RIGHT Cervical 2- 3  3-4 performed by Elaine Cornelius DO at [address] [place] [address] INJECTION Right 9/6/2022    medial branch blocks at RIGHT Cervical 2/3 and 3/4 performed by Elaine Cornelius DO at [facility]  2015    FOOT SURGERY Right 2011    bone removal from foot on rt    FOOT SURGERY Left 2011    bone spur, tarsal tunnel and cyst removal on left    HYSTERECTOMY (CERVIX STATUS UNKNOWN)  1996    total, endometriosis    JOINT REPLACEMENT  2009    bilateral knee    LAPAROSCOPY  2010    for endometriosis    OVARY REMOVAL  1996    TONSILLECTOMY AND ADENOIDECTOMY  2006    UPPER GASTROINTESTINAL ENDOSCOPY  2006       Family History   Problem Relation Age of Onset    COPD Father     Heart Disease Father [address] [facility]        ekg changes    Cancer Sister 50        Multiple malaran     Irritable Bowel Syndrome Sister     Colon Cancer Sister 36        diagnosed age 42's     Arthritis Mother 48    COPD Maternal Aunt     Severe Sprains Maternal Uncle         epilepsy     Cancer Paternal Uncle 58        lung    Diabetes Maternal Grandmother     Kidney Disease Maternal Grandmother 72        Failure form DM     Cancer Maternal Grandfather         Mets all over     Coronary Art Dis Paternal Grandfather 35    Heart Disease Paternal Grandfather 35    Heart Attack Paternal Grandfather 35    Cancer Maternal Uncle         Lung     Stroke Maternal Uncle     Diabetes Maternal Uncle         NIDDM     Cancer Maternal Cousin         lung Cancer     Cancer Daughter         Skin Cancer          Medications & Allergies:   Current Outpatient Medications   Medication Instructions    ARIPiprazole (ABILIFY) 5 MG tablet TAKE 1 TABLET BY MOUTH ONCE DAILY    aspirin 325 MG tablet No dose, route, or frequency recorded. atenolol (TENORMIN) 50 MG tablet ONE AND ONE HALF TAB DAILY    baclofen (LIORESAL) 10 mg, Oral, 3 TIMES DAILY    budesonide-formoterol (SYMBICORT) 160-4.5 MCG/ACT AERO 2 puffs, Inhalation, 2 TIMES DAILY, Rinse mouth after its use. Calcium Carbonate-Vitamin D (CALCIUM + D PO) 1,200 mg, DAILY    Coenzyme Q10 (CO Q 10) 10 MG CAPS 1 capsule, Oral, DAILY PRN    fluticasone (FLONASE) 50 MCG/ACT nasal spray 1 spray, Nasal, DAILY    levalbuterol (XOPENEX HFA) 45 MCG/ACT inhaler 2 puffs, Inhalation, EVERY 6 HOURS PRN    losartan (COZAAR) 25 MG tablet TAKE 1 TABLET BY MOUTH ONCE NIGHTLY.     LYSINE 1,000 mg, Oral, 2 TIMES DAILY PRN    metFORMIN (GLUCOPHAGE-XR) 500 MG extended release tablet TAKE 4 TABLETS BY MOUTH IN THE EVENING.    methylphenidate (RITALIN) 10 mg, Oral, 2 TIMES DAILY    montelukast (SINGULAIR) 10 mg, Oral, NIGHTLY    Multiple Vitamins-Minerals (THERAPEUTIC MULTIVITAMIN-MINERALS) tablet 1 tablet, Oral, DAILY    nitroGLYCERIN (NITROSTAT) 0.4 mg, SubLINGual, EVERY 5 MIN PRN    omeprazole (PRILOSEC) 40 MG delayed release capsule TAKE 1 CAPSULE BY MOUTH IN THE MORNING BEFORE BREAKFAST.    rizatriptan (MAXALT) 10 mg, Oral, ONCE PRN, May repeat in 2 hours if needed    rosuvastatin (CRESTOR) 40 mg, Oral, EVERY EVENING    tiZANidine (ZANAFLEX) 4 MG tablet TAKE 1 TABLET BY MOUTH NIGHTLY AT BEDTIME AS NEEDED    topiramate (TOPAMAX) 50 MG tablet TAKE 1 TABLET BY MOUTH IN THE MORNING AND TAKE 2 TABLETS BY MOUTH IN THE AFTERNOON    venlafaxine (EFFEXOR XR) 150 MG extended release capsule TAKE 1 CAPSULE BY MOUTH 2 TIMES DAILY       Allergies   Allergen Reactions    Bactrim Other (See Comments)     Metallic taste    Iv Dye [Iodides] Nausea Only and Other (See Comments)     Says older IVP dye causes Stomach pains    Tolectin [Tolmetin Sodium] Other (See Comments)     Metallic taste    Claforan [Cefotaxime Sodium In D5w] Hives and Rash    Pcn [Penicillins] Hives and Rash       Review of Systems:   Constitutional: negative for weight changes or fevers  Genitourinary: negative for bowel/bladder incontinence   Musculoskeletal: positive for neck pain  Neurological: positive for migraines but negative for any arm weakness  Behavioral/Psych: negative for anxiety/depression   All other systems reviewed and are negative    Objective: There were no vitals filed for this visit. Constitutional: Pleasant, no acute distress   Head: Normocephalic, atraumatic   Eyes: Conjunctivae normal   Neck: Supple, symmetrical   Respiration: Non-labored breathing   Cardiovascular: Limbs warm and well perfused   Musculoskeletal: Reduced cervical ROM in all planes. Negative Spurling maneuver bilaterally. Increased pain with facet loading on RIGHT. Tenderness to palpation in RIGHT cervical paraspinals. Neuro: Alert, oriented. CN II-XII appear grossly intact. No focal upper limbs deficits. Skin: healed anterior neck incision scar  Psychological: Cooperative, no exaggerated pain behaviors         Assessment:    Diagnosis Orders   1. Cervical spondylosis  CHG FLUOR NEEDLE/CATH SPINE/PARASPINAL DX/THER ADDON    CA DSTR NROLYTC AGNT PARVERTEB FCT SNGL CRVCL/THORA    CA DSTR NROLYTC AGNT PARVERTEB FCT ADDL CRVCL/THORA      2. Occipital neuralgia of right side        3. Neuropathic pain        4. Other chronic pain                  Yung St is a 78 y. o.female presenting to the pain clinic for evaluation of chronic right-sided neck pain and migraines. Her clinical history and physical examination are consistent with occipital neuralgia in both the greater and lesser occipital nerve distribution on the right. She underwent a right occipital nerve block in clinic. We will follow-up in 6 weeks. We may potentially pursue Botox injections versus right C2/C3 (third occipital nerve) block if she fails to respond. She had a good response to her occipital nerve block. He has responded well to her cervical medial branch blocks on the right side targeting C2/C3 and C3/C4 facet joints. I set her up for thermal radiofrequency ablation targeting these levels. We will need clearance to hold her aspirin from her cardiology team.      Plan: The following treatment recommendations and plan were discussed in detail with Robin Wheeler. Imaging/Studies/Labs:   I have reviewed patients imaging of MRI C-spine and results were discussed with patient today. Analgesics:   Patient is taking Excedrin as needed. Patient is advised to take as prescribed and not take on an empty stomach. Adjuvants: In light of the presence of a neuropathic component of pain, patient can continue Topamax twice daily. Interventions: In presence of cervical neck pain and migraines and with physical exam consistent for facetal pain and significance positive diagnostic and confirmatory medial branch blocks, thermal radiofrequency ablation targeting medial branches innervating RIGHT C2/C3 and C3/C4 was chosen. The risks and benefits were discussed in detail with the patient. Patient wants to proceed with the injection. Anticoagulation/NPO Recommendations:   Patient will need to hold Aspirin x 6 days (need medical clearance) prior to the procedure. Patient will need to be NPO x 8 hours prior to the procedure. Plan to start an IV prior to the procedure. Multidisciplinary Pain Management:   In the presence of complex, chronic, and multi-factorial pain, the importance of a multidisciplinary approach to pain management in the patients management regimen was emphasized and discussed in great detail.      Referrals:  None    Prescriptions Written This Visit:   None    Follow-up: RIGHT cervical RFA      Frankie Frausto, DO  Interventional Pain Management/PM&R   [place]

## 2022-11-02 ENCOUNTER — HOSPITAL ENCOUNTER (OUTPATIENT)
Age: 67
Setting detail: OUTPATIENT SURGERY
Discharge: HOME | End: 2022-11-02
Attending: ANESTHESIOLOGY | Admitting: ANESTHESIOLOGY
Payer: MEDICARE

## 2022-11-02 ENCOUNTER — APPOINTMENT (OUTPATIENT)
Dept: GENERAL RADIOLOGY | Age: 67
End: 2022-11-02
Attending: ANESTHESIOLOGY
Payer: MEDICARE

## 2022-11-02 VITALS
BODY MASS INDEX: 25.31 KG/M2 | OXYGEN SATURATION: 96 % | HEART RATE: 73 BPM | DIASTOLIC BLOOD PRESSURE: 56 MMHG | TEMPERATURE: 97.3 F | SYSTOLIC BLOOD PRESSURE: 95 MMHG | HEIGHT: 68 IN | RESPIRATION RATE: 13 BRPM | WEIGHT: 167 LBS

## 2022-11-02 LAB — GLUCOSE BLD-MCNC: 89 MG/DL (ref 70–108)

## 2022-11-02 PROCEDURE — 64634 DESTROY C/TH FACET JNT ADDL: CPT | Performed by: ANESTHESIOLOGY

## 2022-11-02 PROCEDURE — 82948 REAGENT STRIP/BLOOD GLUCOSE: CPT

## 2022-11-02 PROCEDURE — 2709999900 HC NON-CHARGEABLE SUPPLY: Performed by: ANESTHESIOLOGY

## 2022-11-02 PROCEDURE — 3600000054 HC PAIN LEVEL 3 BASE: Performed by: ANESTHESIOLOGY

## 2022-11-02 PROCEDURE — 99152 MOD SED SAME PHYS/QHP 5/>YRS: CPT | Performed by: ANESTHESIOLOGY

## 2022-11-02 PROCEDURE — 2500000003 HC RX 250 WO HCPCS: Performed by: ANESTHESIOLOGY

## 2022-11-02 PROCEDURE — 3209999900 FLUORO FOR SURGICAL PROCEDURES

## 2022-11-02 PROCEDURE — 7100000011 HC PHASE II RECOVERY - ADDTL 15 MIN: Performed by: ANESTHESIOLOGY

## 2022-11-02 PROCEDURE — 64633 DESTROY CERV/THOR FACET JNT: CPT | Performed by: ANESTHESIOLOGY

## 2022-11-02 PROCEDURE — 6360000002 HC RX W HCPCS: Performed by: ANESTHESIOLOGY

## 2022-11-02 PROCEDURE — 7100000010 HC PHASE II RECOVERY - FIRST 15 MIN: Performed by: ANESTHESIOLOGY

## 2022-11-02 RX ORDER — LIDOCAINE HYDROCHLORIDE 20 MG/ML
INJECTION, SOLUTION EPIDURAL; INFILTRATION; INTRACAUDAL; PERINEURAL PRN
Status: DISCONTINUED | OUTPATIENT
Start: 2022-11-02 | End: 2022-11-02 | Stop reason: ALTCHOICE

## 2022-11-02 RX ORDER — MIDAZOLAM HYDROCHLORIDE 1 MG/ML
INJECTION INTRAMUSCULAR; INTRAVENOUS PRN
Status: DISCONTINUED | OUTPATIENT
Start: 2022-11-02 | End: 2022-11-02 | Stop reason: ALTCHOICE

## 2022-11-02 RX ORDER — LIDOCAINE HYDROCHLORIDE 10 MG/ML
INJECTION, SOLUTION EPIDURAL; INFILTRATION; INTRACAUDAL; PERINEURAL PRN
Status: DISCONTINUED | OUTPATIENT
Start: 2022-11-02 | End: 2022-11-02 | Stop reason: ALTCHOICE

## 2022-11-02 RX ORDER — FENTANYL CITRATE 50 UG/ML
INJECTION, SOLUTION INTRAMUSCULAR; INTRAVENOUS PRN
Status: DISCONTINUED | OUTPATIENT
Start: 2022-11-02 | End: 2022-11-02 | Stop reason: ALTCHOICE

## 2022-11-02 ASSESSMENT — PAIN - FUNCTIONAL ASSESSMENT: PAIN_FUNCTIONAL_ASSESSMENT: 0-10

## 2022-11-02 NOTE — POST SEDATION
[address] [place] [address]  Sedation/Analgesia Post Sedation Record    Pt Name: Mulugeta Ceja  MRN: [id number]  YOB: 1955  Procedure Performed By: Edward Nunez DO  Primary Care Physician: Bolivar Dickinson MD    POST-PROCEDURE    Physicians/Assistants: Edward Nunez DO  Procedure Performed: See Procedure Note   Sedation/Anesthesia: Versed and Fentanyl (See procedure note for amount and duration)  Estimated Blood Loss:     0  ml  Specimens Removed: None        Complications: None           Frankie Downey DO  Electronically signed 11/2/2022 at 2:49 PM

## 2022-11-02 NOTE — PRE SEDATION
[facility]  Pre-Sedation/Analgesia History & Physical    Pt Name: Tiffany Ogden  MRN: [id number]  YOB: 1955  Provider Performing Procedure: Hari Hernández DO   Primary Care Physician: Glenny Garvey MD      MEDICAL HISTORY       has a past medical history of Asthma, Depression, Fibromyalgia, Hyperlipemia, Insulin resistance, Narcolepsy, Neck pain, PLMD (periodic limb movement disorder), Prolonged emergence from general anesthesia, and Tachycardia. SURGICAL HISTORY   has a past surgical history that includes Tonsillectomy and adenoidectomy (2006); Carpal tunnel release (Right, 2011); Foot surgery (Right, 2011); Foot surgery (Left, 2011); laparoscopy (2010); joint replacement (2009); Cervical discectomy (2012); Hysterectomy (1996); Upper gastrointestinal endoscopy (2006); Colonoscopy (2006); Coronary angioplasty with stent (2/12/15); Finger trigger release (2015); Ankle surgery (Left, 05/02/2017); cervical fusion (N/A, 12/18/2019); Ankle surgery (Left, 12/09/2020); Ovary removal (1996); Facet joint injection (Right, 8/2/2022); and Facet joint injection (Right, 9/6/2022). ALLERGIES   Allergies as of 10/10/2022 - Fully Reviewed 09/29/2022   Allergen Reaction Noted    Bactrim Other (See Comments) 10/03/2012    Iv dye [iodides] Nausea Only and Other (See Comments) 10/03/2012    Tolectin [tolmetin sodium] Other (See Comments) 10/03/2012    Claforan [cefotaxime sodium in d5w] Hives and Rash 10/03/2012    Pcn [penicillins] Hives and Rash 10/03/2012       MEDICATIONS   Prior to Admission medications    Medication Sig Start Date End Date Taking? Authorizing Provider   budesonide-formoterol (SYMBICORT) 160-4.5 MCG/ACT AERO Inhale 2 puffs into the lungs 2 times daily Rinse mouth after its use.  10/25/22 10/25/23  JENNIFER Hand - CNP   levalbuterol [facility]) 45 MCG/ACT inhaler Inhale 2 puffs into the lungs every 6 hours as needed for Wheezing or Shortness of Breath 10/17/22 10/17/23  JENNIFER Hand CNP   losartan (COZAAR) 25 MG tablet TAKE 1 TABLET BY MOUTH ONCE NIGHTLY. 9/29/22   Luiz Blancas MD   atenolol (TENORMIN) 50 MG tablet ONE AND ONE HALF TAB DAILY 9/29/22   Chuck Amador MD   nitroGLYCERIN (NITROSTAT) 0.4 MG SL tablet Place 1 tablet under the tongue every 5 minutes as needed for Chest pain 9/29/22   Luiz Blancas MD   rosuvastatin (CRESTOR) 40 MG tablet Take 1 tablet by mouth every evening 9/29/22   Luiz Blancas MD   metFORMIN (GLUCOPHAGE-XR) 500 MG extended release tablet TAKE 4 TABLETS BY MOUTH IN THE EVENING. 9/27/22   Glenny Garvey MD   omeprazole (PRILOSEC) 40 MG delayed release capsule TAKE 1 CAPSULE BY MOUTH IN THE MORNING BEFORE BREAKFAST. 9/27/22   Glenny Garvey MD   methylphenidate (RITALIN) 10 MG tablet Take 1 tablet by mouth 2 times daily for 90 days.  9/19/22 12/18/22  Evin Sagastume PA-C   aspirin 325 MG tablet     Historical Provider, MD   montelukast (SINGULAIR) 10 MG tablet TAKE 1 TABLET BY MOUTH NIGHTLY 7/22/22   JENNIFER Hand CNP   baclofen (LIORESAL) 10 MG tablet Take 1 tablet by mouth 3 times daily  Patient not taking: Reported on 11/2/2022 4/18/22   Glenny Garvey MD   venlafaxine (EFFEXOR XR) 150 MG extended release capsule TAKE 1 CAPSULE BY MOUTH 2 TIMES DAILY 3/29/22   Glenny Garvey MD   topiramate (TOPAMAX) 50 MG tablet TAKE 1 TABLET BY MOUTH IN THE MORNING AND TAKE 2 TABLETS BY MOUTH IN THE AFTERNOON 3/16/22   Glenny Garvey MD   rizatriptan (MAXALT) 10 MG tablet Take 1 tablet by mouth once as needed for Migraine May repeat in 2 hours if needed 9/20/21 9/29/22  Glenny Garvey MD   ARIPiprazole (ABILIFY) 5 MG tablet TAKE 1 TABLET BY MOUTH ONCE DAILY 9/2/21   Glenny Garvey MD   tiZANidine (ZANAFLEX) 4 MG tablet TAKE 1 TABLET BY MOUTH NIGHTLY AT BEDTIME AS NEEDED  Patient not taking: Reported on 11/2/2022 7/19/21   Glenny Garvey MD   fluticasone [facility]) 50 MCG/ACT nasal spray 1 spray by Nasal route daily 3/15/21   Glenny Garvey MD Multiple Vitamins-Minerals (THERAPEUTIC MULTIVITAMIN-MINERALS) tablet Take 1 tablet by mouth daily     Historical Provider, MD   Coenzyme Q10 (CO Q 10) 10 MG CAPS Take 1 capsule by mouth daily as needed     Historical Provider, MD   Calcium Carbonate-Vitamin D (CALCIUM + D PO) Take 1,200 mg by mouth daily. Historical Provider, MD   LYSINE Take 1,000 mg by mouth 2 times daily as needed     Historical Provider, MD     PHYSICAL:   Vitals:    11/02/22 1012   BP: (!) 95/56   Pulse: 73   Resp: 13   Temp: 97.3 °F (36.3 °C)   SpO2: 96%     PLANNED PROCEDURE   See procedure note  SEDATION  Planned agent: Versed and Fentanyl  ASA Classification: 2  Class 1: A normal healthy patient  Class 2: Pt with mild to moderate systemic disease  Class 3: Severe systemic disease or disturbance  Class 4: Severe systemic disorders that are already life threatening. Class 5: Moribund pt with little chances of survival, for more than 24 hours. Mallampati I Airway Classification: 2    1. Pre-procedure diagnostic studies complete and results available. 2. Previous sedation/anesthesia experiences assessed. 3. The patient is an appropriate candidate to undergo the planned procedure sedation and anesthesia. (Refer to nursing sedation/analgesia documentation record)  4. Formulation and discussion of sedation/procedure plan, risks, and expectations with patient and/or responsible adult completed. 5. Patient examined immediately prior to the procedure.  (Refer to nursing sedation/analgesia documentation record)    James Vallejo DO  Electronically signed 11/2/2022 at 2:49 PM

## 2022-11-02 NOTE — PROCEDURES
Pre-operative Diagnosis: Cervical facet pain     Post-operative Diagnosis: Cervical facet pain     Procedure: RIGHT cervical thermal radiofrequency ablation targeting facet joints C2/C3 and C3/C4     Procedure Description:  After consent was obtained, the patient was placed in the prone position with pressure points appropriately padded. The neck was prepped with Chloraprep and draped in sterile fashion. 0.5 mL of 1% lidocaine was used for local anesthesia of the skin and superficial subcutaneous tissues. A 22-gauge 100 mm SMK cannula with a 10 mm active tip was advanced under fluoroscopy in the PA view to reach to the waist of the lateral pillar(s) of the respective vertebral body of C2/C3 and C3/C4. The cannula was then further advanced in lateral view to reach the middle of the trapezoid body of the lateral pillar(s) of these vertebrae. After confirming the position of the needle with fluoroscopy, aspiration was performed and was negative for heme or CSF fluid. There were no paresthesias. Sensory and motor stimulation at 50 Hz and 2 Hz, respectively, as well as impedance measurements were carried out having reached threshold on:     RIGHT  C2/C3: 0.2V/3V/150-300 Ohms  C3/C4: 0.2V/3V/150-300 Ohms     Then, 1 mL of 2% Lidocaine was injected at each site. Temperature was then raised to 80 degrees centigrade for 90 seconds with a 15 second temperature ramp. No pain was reported during the lesioning. The needles were then withdrawn without complications. The patient tolerated the procedure well and discharged in ambulatory fashion.         Procedural Complications: None  Estimated Blood Loss: 0 mL       IV sedation was used during the procedure:  - Moderate intravenous conscious sedation was supervised by Dr. Blanche Milan  - The patient was independently monitored by a Registered Nurse assigned to the procedure room  - Monitoring included automated blood pressure, continuous EKG, and continuous pulse oximetry  - The detailed conscious record is permanently stored in the [place] [address]  - The following is the conscious sedation record:  Start Time: 09:58  End Time : 10:13  Duration: 15 minutes   Medications Administered: 2 mg Versed, 50 mcg Fentanyl         Frankie Garber, DO  Interventional Pain Management/PM&R   [place]

## 2022-11-02 NOTE — PROGRESS NOTES
1010: Patient arrived to Phase II recovery via cart. Awake and alert. Report received from [place].  1012: VSS, patient denies pain. HOB elevated and snack and drink provided. Call light in reach. 1032: IV removed without complications. Band aid applied to site. Patient sat edge of bed without difficulty and dressed independently in room. 1040: Patient ambulatory to vehicle and discharged home in stable condition with daughter.

## 2022-11-04 RX ORDER — VENLAFAXINE HYDROCHLORIDE 150 MG/1
CAPSULE, EXTENDED RELEASE ORAL
Qty: 180 CAPSULE | Refills: 1 | Status: SHIPPED | OUTPATIENT
Start: 2022-11-04

## 2022-11-28 ENCOUNTER — OFFICE VISIT (OUTPATIENT)
Dept: PHYSICAL MEDICINE AND REHAB | Age: 67
End: 2022-11-28
Payer: MEDICARE

## 2022-11-28 VITALS
DIASTOLIC BLOOD PRESSURE: 68 MMHG | BODY MASS INDEX: 25.31 KG/M2 | WEIGHT: 167 LBS | HEIGHT: 68 IN | SYSTOLIC BLOOD PRESSURE: 104 MMHG

## 2022-11-28 DIAGNOSIS — G89.29 OTHER CHRONIC PAIN: ICD-10-CM

## 2022-11-28 DIAGNOSIS — M79.2 NEUROPATHIC PAIN: ICD-10-CM

## 2022-11-28 DIAGNOSIS — M54.81 OCCIPITAL NEURALGIA OF RIGHT SIDE: ICD-10-CM

## 2022-11-28 DIAGNOSIS — M47.812 CERVICAL SPONDYLOSIS: Primary | ICD-10-CM

## 2022-11-28 PROCEDURE — G8417 CALC BMI ABV UP PARAM F/U: HCPCS | Performed by: ANESTHESIOLOGY

## 2022-11-28 PROCEDURE — 1123F ACP DISCUSS/DSCN MKR DOCD: CPT | Performed by: ANESTHESIOLOGY

## 2022-11-28 PROCEDURE — 99214 OFFICE O/P EST MOD 30 MIN: CPT | Performed by: ANESTHESIOLOGY

## 2022-11-28 PROCEDURE — 3017F COLORECTAL CA SCREEN DOC REV: CPT | Performed by: ANESTHESIOLOGY

## 2022-11-28 PROCEDURE — 1036F TOBACCO NON-USER: CPT | Performed by: ANESTHESIOLOGY

## 2022-11-28 PROCEDURE — 1090F PRES/ABSN URINE INCON ASSESS: CPT | Performed by: ANESTHESIOLOGY

## 2022-11-28 PROCEDURE — G8484 FLU IMMUNIZE NO ADMIN: HCPCS | Performed by: ANESTHESIOLOGY

## 2022-11-28 PROCEDURE — G8399 PT W/DXA RESULTS DOCUMENT: HCPCS | Performed by: ANESTHESIOLOGY

## 2022-11-28 PROCEDURE — G8427 DOCREV CUR MEDS BY ELIG CLIN: HCPCS | Performed by: ANESTHESIOLOGY

## 2022-11-28 RX ORDER — PREDNISONE 10 MG/1
TABLET ORAL
Qty: 30 TABLET | Refills: 0 | Status: SHIPPED | OUTPATIENT
Start: 2022-11-28

## 2022-11-28 RX ORDER — ARMODAFINIL 150 MG/1
200 TABLET ORAL DAILY
COMMUNITY

## 2022-11-28 NOTE — PROGRESS NOTES
Chronic Pain/PM&R Clinic Note     Encounter Date: 11/28/22    Subjective:   Chief Complaint:   Chief Complaint   Patient presents with    Follow Up After Procedure     radiofrequency ablation RIGHT Cervical 2/3, 3/4  11/2/22  Headaches  Ear aches       History of Present Illness:   Anat Antonio is a 78 y.o. female seen in the clinic initially on 05/02/22 upon request from Dave Meza MD (PCP) for her history of chronic neck pain and migraines. She has a medical history of 2 previous cervical neck surgeries including a C3/C4 and C7/T1 anterior cervical disc fusions. She has been dealing with migraines ever since she was a teenager. She states that she has been noticing worsening pain on the right side of her neck over the last few months without any inciting event. She states this pain is a constant 5/10 shooting, throbbing, stabbing pain. She states the pain will start at the base of her occiput on the right-hand side and radiate around to the top of her scalp around to her right eye and to her ear. She states that this can be worse with mastication but denies any associated blurry vision, double vision, ringing in the ears, or issues with photophobia or phonophobia. She states that she does have some very intermittent numbness/tingling in her left hand. She denies any worsening balance/gait disturbances. She states that she has been managing her pain with the use of Excedrin, Maxalt, Topamax, baclofen. She states she was close to having Botox done for her migraines but this was never pursued when she had to have her second surgery. Today, 11/20/2022, patient presents for planned follow-up for chronic neck pain and migraines. She completed her right-sided cervical radiofrequency ablation on 11/2/2022. She states that she has been experiencing worsening migraine headaches and ear pain since completing this procedure.   She is wondering why the ablation has not given her relief as she responded significantly to her test injections in the past.  She is wondering what she can do to help out with some of the symptoms and what the next step for pain management is. She denies any other new symptoms this point.       History of Interventions:   Surgery: Previous neck surgeries (C3/C4 and C7/T1 ACDF)  Injections: Right occipital nerve block (22) - 50-60% relief x 6 weeks  RIGHT cervical MBBs (22) - 100% relief x 3-4 days   Right confimatory cervical MBBs (2022)-100% relief x 4 days  Right cervical RFA (40) - complicated by neuritis    Imaging/Studies:  MRI C-spine         Past Medical History:   Diagnosis Date    Asthma     Dr. Tommy Griffith    Depression       from [facility] neck    Fibromyalgia     Hyperlipemia     check per Dr Kendrick Martinez     Insulin resistance     Narcolepsy     per Dr. Kendrick Martinez via sleep study    Neck pain     cervical stenosis    PLMD (periodic limb movement disorder)     Prolonged emergence from general anesthesia     Tachycardia        Past Surgical History:   Procedure Laterality Date    ANKLE SURGERY Left 2017    OIO    ANKLE SURGERY Left 2020    Replacement and tendon repair    CARPAL TUNNEL RELEASE Right     CERVICAL DISCECTOMY  2012    C3-4, C4-5    CERVICAL FUSION N/A 2019    REMOVAL OF PLATE D8-2, ACDF L8-1, C6-7 WITH PLATING/MEDTRONIC performed by Dilip Rodriguez MD at [address]  2/12/15    Dr. Chan Thrasher Right 2022    [facility] 673 medial branch blocks  RIGHT Cervical 2- 3  3-4 performed by Hari Hernández DO at [address] [facility] Right 2022    medial branch blocks at RIGHT Cervical 2/3 and 3/4 performed by Hari Hernández DO at [place] [address]  2015    FOOT SURGERY Right     bone removal from foot on rt    FOOT SURGERY Left     bone spur, tarsal tunnel and cyst removal on left    HYSTERECTOMY (CERVIX STATUS UNKNOWN)  1996    total, endometriosis    JOINT REPLACEMENT  2009    bilateral knee    LAPAROSCOPY  2010    for endometriosis    OVARY REMOVAL  1996    PAIN MANAGEMENT PROCEDURE Right 11/2/2022    radiofrequency ablation RIGHT Cervical 2/3, 3/4 performed by Jeremias Murry DO at [address] [facility]  2006    UPPER GASTROINTESTINAL ENDOSCOPY  2006       Family History   Problem Relation Age of Onset    COPD Father     Heart Disease Father 61        ekg changes    Cancer Sister 50        Multiple malaran     Irritable Bowel Syndrome Sister     Colon Cancer Sister 36        diagnosed age 42's     Arthritis Mother 48    COPD Maternal Aunt     Severe Sprains Maternal Uncle         epilepsy     Cancer Paternal Uncle 58        lung    Diabetes Maternal Grandmother     Kidney Disease Maternal Grandmother 72        Failure form DM     Cancer Maternal Grandfather         Mets all over     Coronary Art Dis Paternal Grandfather 35    Heart Disease Paternal Grandfather 35    Heart Attack Paternal Grandfather 35    Cancer Maternal Uncle         Lung     Stroke Maternal Uncle     Diabetes Maternal Uncle         NIDDM     Cancer Maternal Cousin         lung Cancer     Cancer Daughter         Skin Cancer          Medications & Allergies:   Current Outpatient Medications   Medication Instructions    ARIPiprazole (ABILIFY) 5 MG tablet TAKE 1 TABLET BY MOUTH ONCE DAILY    Armodafinil (NUVIGIL) 200 mg, Oral, DAILY    aspirin 325 MG tablet No dose, route, or frequency recorded. atenolol (TENORMIN) 50 MG tablet ONE AND ONE HALF TAB DAILY    baclofen (LIORESAL) 10 mg, Oral, 3 TIMES DAILY    budesonide-formoterol (SYMBICORT) 160-4.5 MCG/ACT AERO 2 puffs, Inhalation, 2 TIMES DAILY, Rinse mouth after its use.     Calcium Carbonate-Vitamin D (CALCIUM + D PO) 1,200 mg, DAILY    Coenzyme Q10 (CO Q 10) 10 MG CAPS 1 capsule, Oral, DAILY PRN    fluticasone (FLONASE) 50 MCG/ACT nasal spray 1 spray, Nasal, DAILY    levalbuterol (XOPENEX HFA) 45 MCG/ACT inhaler 2 puffs, Inhalation, EVERY 6 HOURS PRN    losartan (COZAAR) 25 MG tablet TAKE 1 TABLET BY MOUTH ONCE NIGHTLY. LYSINE 1,000 mg, Oral, 2 TIMES DAILY PRN    metFORMIN (GLUCOPHAGE-XR) 500 MG extended release tablet TAKE 4 TABLETS BY MOUTH IN THE EVENING.    methylphenidate (RITALIN) 10 mg, Oral, 2 TIMES DAILY    montelukast (SINGULAIR) 10 mg, Oral, NIGHTLY    Multiple Vitamins-Minerals (THERAPEUTIC MULTIVITAMIN-MINERALS) tablet 1 tablet, DAILY    nitroGLYCERIN (NITROSTAT) 0.4 mg, SubLINGual, EVERY 5 MIN PRN    omeprazole (PRILOSEC) 40 MG delayed release capsule TAKE 1 CAPSULE BY MOUTH IN THE MORNING BEFORE BREAKFAST. predniSONE (DELTASONE) 10 MG tablet 4 tabs oral daily for 3 days, then 3 tabs oral daily for 3 days, then 2 tabs oral daily for 3 days, then 1 tab oral daily for 3 days. rizatriptan (MAXALT) 10 mg, Oral, ONCE PRN, May repeat in 2 hours if needed    rosuvastatin (CRESTOR) 40 mg, Oral, EVERY EVENING    tiZANidine (ZANAFLEX) 4 MG tablet TAKE 1 TABLET BY MOUTH NIGHTLY AT BEDTIME AS NEEDED    topiramate (TOPAMAX) 50 MG tablet TAKE 1 TABLET BY MOUTH IN THE MORNING AND TAKE 2 TABLETS BY MOUTH IN THE AFTERNOON    venlafaxine (EFFEXOR XR) 150 MG extended release capsule TAKE 1 CAPSULE BY MOUTH 2 TIMES DAILY.        Allergies   Allergen Reactions    Bactrim Other (See Comments)     Metallic taste    Iv Dye [Iodides] Nausea Only and Other (See Comments)     Says older IVP dye causes Stomach pains    Tolectin [Tolmetin Sodium] Other (See Comments)     Metallic taste    Claforan [Cefotaxime Sodium In D5w] Hives and Rash    Pcn [Penicillins] Hives and Rash       Review of Systems:   Constitutional: negative for weight changes or fevers  Genitourinary: negative for bowel/bladder incontinence   Musculoskeletal: positive for neck pain  Neurological: positive for migraines but negative for any arm weakness  Behavioral/Psych: negative for anxiety/depression   All other systems reviewed and are negative    Objective:     Vitals:    11/28/22 1204   BP: 104/68       Constitutional: Pleasant, no acute distress   Head: Normocephalic, atraumatic   Eyes: Conjunctivae normal   Neck: Supple, symmetrical   Respiration: Non-labored breathing   Cardiovascular: Limbs warm and well perfused   Musculoskeletal: Reduced cervical ROM in all planes. Negative Spurling maneuver bilaterally. Increased pain with facet loading on RIGHT. Tenderness to palpation in RIGHT cervical paraspinals. Neuro: Alert, oriented. CN II-XII appear grossly intact. No focal upper limbs deficits. Allodynia over right posterior scalp region. Skin: healed anterior neck incision scar  Psychological: Cooperative, no exaggerated pain behaviors         Assessment:    Diagnosis Orders   1. Cervical spondylosis        2. Occipital neuralgia of right side        3. Neuropathic pain        4. Other chronic pain                    Sudhakar Altman is a 78 y. o.female presenting to the pain clinic for evaluation of chronic right-sided neck pain and migraines. Her clinical history and physical examination are consistent with occipital neuralgia in both the greater and lesser occipital nerve distribution on the right. She underwent a right occipital nerve block in clinic. We will follow-up in 6 weeks. We may potentially pursue Botox injections versus right C2/C3 (third occipital nerve) block if she fails to respond. She had a good response to her occipital nerve block. She has completed her cervical repeat the ablation but has been complicated by postop neuritis. I have given her a prednisone taper we will follow-up in 4 weeks for reevaluation. Plan: The following treatment recommendations and plan were discussed in detail with Sudhakar Altman. Imaging/Studies/Labs:   I have reviewed patients imaging of MRI C-spine.     Analgesics: Patient is taking Excedrin as needed. Patient is advised to take as prescribed and not take on an empty stomach. Adjuvants: In light of the presence of a neuropathic component of pain, patient can continue Topamax twice daily. Interventions:   None    Anticoagulation/NPO Recommendations:   None    Multidisciplinary Pain Management:   In the presence of complex, chronic, and multi-factorial pain, the importance of a multidisciplinary approach to pain management in the patients management regimen was emphasized and discussed in great detail.      Referrals:  None    Prescriptions Written This Visit:   Prednisone taper    Follow-up: 4 weeks      Fernando Sanchez DO  Interventional Pain Management/PM&R   [place]

## 2022-12-22 DIAGNOSIS — G47.419 PRIMARY NARCOLEPSY WITHOUT CATAPLEXY: ICD-10-CM

## 2022-12-22 RX ORDER — ARMODAFINIL 200 MG/1
200 TABLET ORAL DAILY
COMMUNITY
Start: 2020-09-14 | End: 2022-12-30

## 2022-12-22 NOTE — TELEPHONE ENCOUNTER
Tisha Fitch called requesting a refill on the following medications:  Requested Prescriptions     Pending Prescriptions Disp Refills    Armodafinil (NUVIGIL) 200 MG TABS 30 tablet 0     Sig: Take 200 mg by mouth daily for 31 days.     Pharmacy verified:[facility]      Date of last visit:   Date of next visit (if applicable): Visit date not found

## 2022-12-22 NOTE — TELEPHONE ENCOUNTER
Received refill request for Nuvigil. Medication was last ordered by [facility]. Medication was last ordered on 9/14/20 with [facility] refills. Patient was last seen in the office 9/19/22. Does patient have a scheduled follow up?: yes - 9/25/23    Medication needs to be sent to [facility] [address] - [place], [address] [facility] [phone]. Thank you, please advise! Patient's Allergies:   Allergies   Allergen Reactions    Bactrim Other (See Comments)     Metallic taste    Iv Dye [Iodides] Nausea Only and Other (See Comments)     Says older IVP dye causes Stomach pains    Tolectin [Tolmetin Sodium] Other (See Comments)     Metallic taste    Claforan [Cefotaxime Sodium In D5w] Hives and Rash    Pcn [Penicillins] Hives and Rash

## 2022-12-30 RX ORDER — ARMODAFINIL 200 MG/1
200 TABLET ORAL DAILY
Qty: 30 TABLET | Refills: 5 | Status: SHIPPED | OUTPATIENT
Start: 2022-12-30 | End: 2023-01-30

## 2023-01-19 ENCOUNTER — OFFICE VISIT (OUTPATIENT)
Dept: PHYSICAL MEDICINE AND REHAB | Age: 68
End: 2023-01-19
Payer: MEDICARE

## 2023-01-19 VITALS
SYSTOLIC BLOOD PRESSURE: 120 MMHG | DIASTOLIC BLOOD PRESSURE: 82 MMHG | BODY MASS INDEX: 25.31 KG/M2 | WEIGHT: 167 LBS | HEIGHT: 68 IN

## 2023-01-19 DIAGNOSIS — M54.81 OCCIPITAL NEURALGIA OF RIGHT SIDE: ICD-10-CM

## 2023-01-19 DIAGNOSIS — M79.2 NEUROPATHIC PAIN: ICD-10-CM

## 2023-01-19 DIAGNOSIS — G89.29 OTHER CHRONIC PAIN: ICD-10-CM

## 2023-01-19 DIAGNOSIS — G43.719 INTRACTABLE CHRONIC MIGRAINE WITHOUT AURA AND WITHOUT STATUS MIGRAINOSUS: Primary | ICD-10-CM

## 2023-01-19 DIAGNOSIS — M47.812 CERVICAL SPONDYLOSIS: ICD-10-CM

## 2023-01-19 PROCEDURE — G8417 CALC BMI ABV UP PARAM F/U: HCPCS | Performed by: ANESTHESIOLOGY

## 2023-01-19 PROCEDURE — G8484 FLU IMMUNIZE NO ADMIN: HCPCS | Performed by: ANESTHESIOLOGY

## 2023-01-19 PROCEDURE — 1123F ACP DISCUSS/DSCN MKR DOCD: CPT | Performed by: ANESTHESIOLOGY

## 2023-01-19 PROCEDURE — 3017F COLORECTAL CA SCREEN DOC REV: CPT | Performed by: ANESTHESIOLOGY

## 2023-01-19 PROCEDURE — 99214 OFFICE O/P EST MOD 30 MIN: CPT | Performed by: ANESTHESIOLOGY

## 2023-01-19 PROCEDURE — 1036F TOBACCO NON-USER: CPT | Performed by: ANESTHESIOLOGY

## 2023-01-19 PROCEDURE — G8399 PT W/DXA RESULTS DOCUMENT: HCPCS | Performed by: ANESTHESIOLOGY

## 2023-01-19 PROCEDURE — 1090F PRES/ABSN URINE INCON ASSESS: CPT | Performed by: ANESTHESIOLOGY

## 2023-01-19 PROCEDURE — G8427 DOCREV CUR MEDS BY ELIG CLIN: HCPCS | Performed by: ANESTHESIOLOGY

## 2023-01-19 NOTE — PROGRESS NOTES
Chronic Pain/PM&R Clinic Note     Encounter Date: 1/19/23    Subjective:   Chief Complaint:   Chief Complaint   Patient presents with    Follow-up     Headaches        History of Present Illness:   Inder Linder is a 78 y.o. female seen in the clinic initially on 05/02/22 upon request from Halima Montiel MD (PCP) for her history of chronic neck pain and migraines. She has a medical history of 2 previous cervical neck surgeries including a C3/C4 and C7/T1 anterior cervical disc fusions. She has been dealing with migraines ever since she was a teenager. She states that she has been noticing worsening pain on the right side of her neck over the last few months without any inciting event. She states this pain is a constant 5/10 shooting, throbbing, stabbing pain. She states the pain will start at the base of her occiput on the right-hand side and radiate around to the top of her scalp around to her right eye and to her ear. She states that this can be worse with mastication but denies any associated blurry vision, double vision, ringing in the ears, or issues with photophobia or phonophobia. She states that she does have some very intermittent numbness/tingling in her left hand. She denies any worsening balance/gait disturbances. She states that she has been managing her pain with the use of Excedrin, Maxalt, Topamax, baclofen. She states she was close to having Botox done for her migraines but this was never pursued when she had to have her second surgery. Today, 1/19/2023, patient presents for planned follow-up for chronic neck pain and migraines. She continues to have ongoing migraine headache particular in the right side with radiation around to the right temporal region. She is wondering what she could potentially do to help out with these migraines as she feels like she has failed multiple injections so far. She also does not want to be dependent on the help manage her migraines.   She continues feel like her migraines interfere with her ability to sleep and function with everyday life. She is wondering what the next steps are for pain management.       History of Interventions:   Surgery: Previous neck surgeries (C3/C4 and C7/T1 ACDF)  Injections: Right occipital nerve block (22) - 50-60% relief x 6 weeks  RIGHT cervical MBBs (22) - 100% relief x 3-4 days   Right confimatory cervical MBBs (2022)-100% relief x 4 days  Right cervical RFA () - complicated by neuritis    Imaging/Studies:  MRI C-spine         Past Medical History:   Diagnosis Date    Asthma     Dr. Martha Ayala    Depression 2000      from [facility] neck    Fibromyalgia     Hyperlipemia     check per Dr Sandra Coburn     Insulin resistance     Narcolepsy 2000    per Dr. Sandra Coburn via sleep study    Neck pain     cervical stenosis    PLMD (periodic limb movement disorder)     Prolonged emergence from general anesthesia     Tachycardia        Past Surgical History:   Procedure Laterality Date    ANKLE SURGERY Left 2017    OIO    ANKLE SURGERY Left 2020    Replacement and tendon repair    CARPAL TUNNEL RELEASE Right     CERVICAL DISCECTOMY  2012    C3-4, C4-5    CERVICAL FUSION N/A 2019    REMOVAL OF PLATE T9-6, ACDF H5-5, C6-7 WITH PLATING/MEDTRONIC performed by Ama Valladares MD at [address] [facility] [address]  2/12/15    Dr. Jose Dumas Right 2022    [facility]  RIGHT Cervical 2- 3  3-4 performed by Shanda Pearce DO at [address] [facility] Right 2022    medial branch blocks at RIGHT Cervical 2/3 and 3/4 performed by Shanda Pearce DO at [place] [address]      FOOT SURGERY Right     bone removal from foot on rt    FOOT SURGERY Left     bone spur, tarsal tunnel and cyst removal on left HYSTERECTOMY (CERVIX STATUS UNKNOWN)  1996    total, endometriosis    JOINT REPLACEMENT  2009    bilateral knee    LAPAROSCOPY  2010    for endometriosis    OVARY REMOVAL  1996    PAIN MANAGEMENT PROCEDURE Right 11/2/2022    radiofrequency ablation RIGHT Cervical 2/3, 3/4 performed by Shanda Pearce DO at [address] [facility]  2006    UPPER GASTROINTESTINAL ENDOSCOPY  2006       Family History   Problem Relation Age of Onset    COPD Father     Heart Disease Father 61        ekg changes    Cancer Sister 50        Multiple malaran     Irritable Bowel Syndrome Sister     Colon Cancer Sister 36        diagnosed age 42's     Arthritis Mother 48    COPD Maternal Aunt     Severe Sprains Maternal Uncle         epilepsy     Cancer Paternal Uncle 58        lung    Diabetes Maternal Grandmother     Kidney Disease Maternal Grandmother 72        Failure form DM     Cancer Maternal Grandfather         Mets all over     Coronary Art Dis Paternal Grandfather 35    Heart Disease Paternal Grandfather 35    Heart Attack Paternal Grandfather 35    Cancer Maternal Uncle         Lung     Stroke Maternal Uncle     Diabetes Maternal Uncle         NIDDM     Cancer Maternal Cousin         lung Cancer     Cancer Daughter         Skin Cancer          Medications & Allergies:   Current Outpatient Medications   Medication Instructions    ARIPiprazole (ABILIFY) 5 MG tablet TAKE 1 TABLET BY MOUTH ONCE DAILY    Armodafinil (NUVIGIL) 200 mg, Oral, DAILY    aspirin 325 MG tablet No dose, route, or frequency recorded. atenolol (TENORMIN) 50 MG tablet ONE AND ONE HALF TAB DAILY    baclofen (LIORESAL) 10 mg, Oral, 3 TIMES DAILY    budesonide-formoterol (SYMBICORT) 160-4.5 MCG/ACT AERO 2 puffs, Inhalation, 2 TIMES DAILY, Rinse mouth after its use.     Calcium Carbonate-Vitamin D (CALCIUM + D PO) 1,200 mg, DAILY    Coenzyme Q10 (CO Q 10) 10 MG CAPS 1 capsule, Oral, DAILY PRN    fluticasone (FLONASE) 50 MCG/ACT nasal spray 1 spray, Nasal, DAILY    levalbuterol (XOPENEX HFA) 45 MCG/ACT inhaler 2 puffs, Inhalation, EVERY 6 HOURS PRN    losartan (COZAAR) 25 MG tablet TAKE 1 TABLET BY MOUTH ONCE NIGHTLY. LYSINE 1,000 mg, Oral, 2 TIMES DAILY PRN    metFORMIN (GLUCOPHAGE-XR) 500 MG extended release tablet TAKE 4 TABLETS BY MOUTH IN THE EVENING.    montelukast (SINGULAIR) 10 mg, Oral, NIGHTLY    Multiple Vitamins-Minerals (THERAPEUTIC MULTIVITAMIN-MINERALS) tablet 1 tablet, Oral, DAILY    nitroGLYCERIN (NITROSTAT) 0.4 mg, SubLINGual, EVERY 5 MIN PRN    omeprazole (PRILOSEC) 40 MG delayed release capsule TAKE 1 CAPSULE BY MOUTH IN THE MORNING BEFORE BREAKFAST.    rizatriptan (MAXALT) 10 mg, Oral, ONCE PRN, May repeat in 2 hours if needed    rosuvastatin (CRESTOR) 40 mg, Oral, EVERY EVENING    tiZANidine (ZANAFLEX) 4 MG tablet TAKE 1 TABLET BY MOUTH NIGHTLY AT BEDTIME AS NEEDED    topiramate (TOPAMAX) 50 MG tablet TAKE 1 TABLET BY MOUTH IN THE MORNING AND TAKE 2 TABLETS BY MOUTH IN THE AFTERNOON    venlafaxine (EFFEXOR XR) 150 MG extended release capsule TAKE 1 CAPSULE BY MOUTH 2 TIMES DAILY.        Allergies   Allergen Reactions    Bactrim Other (See Comments)     Metallic taste    Iv Dye [Iodides] Nausea Only and Other (See Comments)     Says older IVP dye causes Stomach pains    Tolectin [Tolmetin Sodium] Other (See Comments)     Metallic taste    Claforan [Cefotaxime Sodium In D5w] Hives and Rash    Pcn [Penicillins] Hives and Rash       Review of Systems:   Constitutional: negative for weight changes or fevers  Genitourinary: negative for bowel/bladder incontinence   Musculoskeletal: positive for neck pain  Neurological: positive for migraines but negative for any arm weakness  Behavioral/Psych: negative for anxiety/depression   All other systems reviewed and are negative    Objective:     Vitals:    01/19/23 0847   BP: 120/82       Constitutional: Pleasant, no acute distress   Head: Normocephalic, atraumatic   Eyes: Conjunctivae normal   Neck: Supple, symmetrical   Respiration: Non-labored breathing   Cardiovascular: Limbs warm and well perfused   Musculoskeletal: Reduced cervical ROM in all planes. Negative Spurling maneuver bilaterally. Increased pain with facet loading on RIGHT. Tenderness to palpation in RIGHT cervical paraspinals. Neuro: Alert, oriented. CN II-XII appear grossly intact. No focal upper limbs deficits. Allodynia over right posterior scalp region. Skin: healed anterior neck incision scar  Psychological: Cooperative, no exaggerated pain behaviors         Assessment:    Diagnosis Orders   1. Intractable chronic migraine without aura and without status migrainosus        2. Cervical spondylosis        3. Occipital neuralgia of right side        4. Neuropathic pain        5. Other chronic pain              Howard Grace is a 78 y. o.female presenting to the pain clinic for evaluation of chronic right-sided neck pain and migraines. Her clinical history and physical examination are consistent with occipital neuralgia in both the greater and lesser occipital nerve distribution on the right. She underwent a right occipital nerve block in clinic. We will follow-up in 6 weeks. We may potentially pursue Botox injections versus right C2/C3 (third occipital nerve) block if she fails to respond. She had a good response to her occipital nerve block and failed to respond to her cervical repeat the ablation. I set her up for Botox injections to treat her migraine headaches. Plan: The following treatment recommendations and plan were discussed in detail with Howard Grace. Imaging/Studies/Labs:   I have reviewed patients imaging of MRI C-spine. Analgesics:   Patient is taking Excedrin as needed. Patient is advised to take as prescribed and not take on an empty stomach. Adjuvants:    In light of the presence of a neuropathic component of pain, patient can continue Topamax twice daily.    Interventions: In the light of patient's clinical history and suboptimal response to multiple treatment modalities as detailed above, we discussed the option of using onabotulinumtoxinA (Botox) injection for better management of chronic migraine headaches. The risks and benefits were discussed in detail with the patient. Patient wants to proceed with this injection at her next visit. Anticoagulation/NPO Recommendations:   None    Multidisciplinary Pain Management:   In the presence of complex, chronic, and multi-factorial pain, the importance of a multidisciplinary approach to pain management in the patients management regimen was emphasized and discussed in great detail.      Referrals:  None    Prescriptions Written This Visit:   None    Follow-up: For Botox injections      Minor An, DO  Interventional Pain Management/PM&R   [place]

## 2023-01-26 ENCOUNTER — OFFICE VISIT (OUTPATIENT)
Dept: PHYSICAL MEDICINE AND REHAB | Age: 68
End: 2023-01-26

## 2023-01-26 VITALS
WEIGHT: 167 LBS | SYSTOLIC BLOOD PRESSURE: 116 MMHG | HEIGHT: 68 IN | BODY MASS INDEX: 25.31 KG/M2 | DIASTOLIC BLOOD PRESSURE: 84 MMHG

## 2023-01-26 DIAGNOSIS — G43.719 INTRACTABLE CHRONIC MIGRAINE WITHOUT AURA AND WITHOUT STATUS MIGRAINOSUS: Primary | ICD-10-CM

## 2023-01-27 NOTE — PROGRESS NOTES
Pre-operative Diagnosis: Chronic Migraine Headaches     Post-operative Diagnosis: Chronic Migraine Headaches     Procedure: Botox for migraine headaches     Procedure Description:  Patient was reclined on the examination table. Botox was drawn up into a tuberculin syringes, to a concentration of 5 units per 0.1 mL with a 30-gauge needle. Skin was prepped with alcohol wipes. The following muscles were injected after negative aspiration; The frontalis muscle bilaterally a total of 4 sites (20 units), The  muscle bilaterally a total of 2 sites (10 units), the procerus one site (5 units), the occipitalis bilaterally a total of 6 sites (30 units), The temporalis muscle bilaterally a total of 8 sites (40 units), the trapezius bilaterally a total of 6 sites (30 units), and cervical paraspinal muscle groups a total of 4 sites (20 units). This was a total of 155 units at 31 sites. The patient tolerated the procedure well.     Medications: 4 mL in 200 U Botox drawn up in 4 separate 1 mL TB syringes = 5 U per 0.1 mL syringe    Amount medication wasted: 45 units        Procedural Complications: None        Frankie Garber DO  Interventional Pain Management/PM&R   [place]

## 2023-03-10 ENCOUNTER — OFFICE VISIT (OUTPATIENT)
Dept: PHYSICAL MEDICINE AND REHAB | Age: 68
End: 2023-03-10
Payer: MEDICARE

## 2023-03-10 VITALS
HEIGHT: 68 IN | WEIGHT: 167 LBS | DIASTOLIC BLOOD PRESSURE: 92 MMHG | BODY MASS INDEX: 25.31 KG/M2 | SYSTOLIC BLOOD PRESSURE: 134 MMHG

## 2023-03-10 DIAGNOSIS — M54.81 OCCIPITAL NEURALGIA OF RIGHT SIDE: ICD-10-CM

## 2023-03-10 DIAGNOSIS — M47.812 CERVICAL SPONDYLOSIS: ICD-10-CM

## 2023-03-10 DIAGNOSIS — G43.719 INTRACTABLE CHRONIC MIGRAINE WITHOUT AURA AND WITHOUT STATUS MIGRAINOSUS: Primary | ICD-10-CM

## 2023-03-10 PROCEDURE — G8427 DOCREV CUR MEDS BY ELIG CLIN: HCPCS | Performed by: ANESTHESIOLOGY

## 2023-03-10 PROCEDURE — 1036F TOBACCO NON-USER: CPT | Performed by: ANESTHESIOLOGY

## 2023-03-10 PROCEDURE — G8399 PT W/DXA RESULTS DOCUMENT: HCPCS | Performed by: ANESTHESIOLOGY

## 2023-03-10 PROCEDURE — 1090F PRES/ABSN URINE INCON ASSESS: CPT | Performed by: ANESTHESIOLOGY

## 2023-03-10 PROCEDURE — 99214 OFFICE O/P EST MOD 30 MIN: CPT | Performed by: ANESTHESIOLOGY

## 2023-03-10 PROCEDURE — G8417 CALC BMI ABV UP PARAM F/U: HCPCS | Performed by: ANESTHESIOLOGY

## 2023-03-10 PROCEDURE — 3017F COLORECTAL CA SCREEN DOC REV: CPT | Performed by: ANESTHESIOLOGY

## 2023-03-10 PROCEDURE — 1123F ACP DISCUSS/DSCN MKR DOCD: CPT | Performed by: ANESTHESIOLOGY

## 2023-03-10 PROCEDURE — G8484 FLU IMMUNIZE NO ADMIN: HCPCS | Performed by: ANESTHESIOLOGY

## 2023-03-10 RX ORDER — CYCLOBENZAPRINE HCL 10 MG
10 TABLET ORAL 3 TIMES DAILY PRN
Qty: 90 TABLET | Refills: 0 | Status: SHIPPED | OUTPATIENT
Start: 2023-03-10 | End: 2023-04-09

## 2023-03-10 NOTE — PROGRESS NOTES
Chronic Pain/PM&R Clinic Note     Encounter Date: 3/10/23    Subjective:   Chief Complaint:   Chief Complaint   Patient presents with    Follow-up       History of Present Illness:   Jerry Madrigal is a 78 y.o. female seen in the clinic initially on 05/02/22 upon request from Jose Novoa MD (PCP) for her history of chronic neck pain and migraines. She has a medical history of 2 previous cervical neck surgeries including a C3/C4 and C7/T1 anterior cervical disc fusions. She has been dealing with migraines ever since she was a teenager. She states that she has been noticing worsening pain on the right side of her neck over the last few months without any inciting event. She states this pain is a constant 5/10 shooting, throbbing, stabbing pain. She states the pain will start at the base of her occiput on the right-hand side and radiate around to the top of her scalp around to her right eye and to her ear. She states that this can be worse with mastication but denies any associated blurry vision, double vision, ringing in the ears, or issues with photophobia or phonophobia. She states that she does have some very intermittent numbness/tingling in her left hand. She denies any worsening balance/gait disturbances. She states that she has been managing her pain with the use of Excedrin, Maxalt, Topamax, baclofen. She states she was close to having Botox done for her migraines but this was never pursued when she had to have her second surgery. Today, 3/10/2023, patient presents for planned follow-up for chronic neck pain and migraine headaches. She underwent her initial round of Botox 6 weeks ago. She states that she has seen some improvement in her overall migraines. She states that she has seen a reduction in the number of migraine she is having and reduction in the severity of her migraines.   She states that she is still having breakthrough in the back of her neck and does suffer from teeth clenching and bruxism. She also states she needs a refill on her Flexeril.       History of Interventions:   Surgery: Previous neck surgeries (C3/C4 and C7/T1 ACDF)  Injections: Right occipital nerve block (22) - 50-60% relief x 6 weeks  RIGHT cervical MBBs (22) - 100% relief x 3-4 days   Right confimatory cervical MBBs (2022)-100% relief x 4 days  Right cervical RFA (89) - complicated by neuritis    Imaging/Studies:  MRI C-spine         Past Medical History:   Diagnosis Date    Asthma     Dr. Nathan Maya    Depression 2000      from [facility] neck    Fibromyalgia     Hyperlipemia     check per Dr Santosh Matthews     Insulin resistance     Narcolepsy     per Dr. Santosh Matthews via sleep study    Neck pain     cervical stenosis    PLMD (periodic limb movement disorder)     Prolonged emergence from general anesthesia     Tachycardia        Past Surgical History:   Procedure Laterality Date    ANKLE SURGERY Left 2017    OIO    ANKLE SURGERY Left 2020    Replacement and tendon repair    CARPAL TUNNEL RELEASE Right     CERVICAL DISCECTOMY  2012    C3-4, C4-5    CERVICAL FUSION N/A 2019    REMOVAL OF PLATE T8-5, ACDF W0-5, C6-7 WITH PLATING/MEDTRONIC performed by Arden Carrington MD at [address]  2/12/15    Dr. Aron Schroeder Right 2022    [facility] medial branch blocks  RIGHT Cervical 2- 3  3-4 performed by Anne Marie Mirza DO at [address] [facility] Right 2022    medial branch blocks at RIGHT Cervical 2/3 and 3/4 performed by Anne Marie Mirza DO at [place] [id number]      FOOT SURGERY Right     bone removal from foot on rt    FOOT SURGERY Left     bone spur, tarsal tunnel and cyst removal on left    HYSTERECTOMY (CERVIX STATUS UNKNOWN)  1996    total, endometriosis    JOINT REPLACEMENT   bilateral knee    LAPAROSCOPY  2010    for endometriosis    OVARY REMOVAL  1996    PAIN MANAGEMENT PROCEDURE Right 11/2/2022    radiofrequency ablation RIGHT Cervical 2/3, 3/4 performed by Gianluca Bustamante DO at [facility]  2006    UPPER GASTROINTESTINAL ENDOSCOPY  2006       Family History   Problem Relation Age of Onset    COPD Father     Heart Disease Father 61        ekg changes    Cancer Sister 50        Multiple malaran     Irritable Bowel Syndrome Sister     Colon Cancer Sister 36        diagnosed age 42's     Arthritis Mother 48    COPD Maternal Aunt     Severe Sprains Maternal Uncle         epilepsy     Cancer Paternal Uncle 58        lung    Diabetes Maternal Grandmother     Kidney Disease Maternal Grandmother 72        Failure form DM     Cancer Maternal Grandfather         Mets all over     Coronary Art Dis Paternal Grandfather 35    Heart Disease Paternal Grandfather 35    Heart Attack Paternal Grandfather 35    Cancer Maternal Uncle         Lung     Stroke Maternal Uncle     Diabetes Maternal Uncle         NIDDM     Cancer Maternal Cousin         lung Cancer     Cancer Daughter         Skin Cancer          Medications & Allergies:   Current Outpatient Medications   Medication Instructions    ARIPiprazole (ABILIFY) 5 MG tablet TAKE 1 TABLET BY MOUTH ONCE DAILY    aspirin 325 MG tablet No dose, route, or frequency recorded. atenolol (TENORMIN) 50 MG tablet ONE AND ONE HALF TAB DAILY    baclofen (LIORESAL) 10 mg, Oral, 3 TIMES DAILY    budesonide-formoterol (SYMBICORT) 160-4.5 MCG/ACT AERO 2 puffs, Inhalation, 2 TIMES DAILY, Rinse mouth after its use.     Calcium Carbonate-Vitamin D (CALCIUM + D PO) 1,200 mg, DAILY    Coenzyme Q10 (CO Q 10) 10 MG CAPS 1 capsule, Oral, DAILY PRN    cyclobenzaprine (FLEXERIL) 10 mg, Oral, 3 TIMES DAILY PRN    fluticasone (FLONASE) 50 MCG/ACT nasal spray 1 spray, Nasal, DAILY    levalbuterol (XOPENEX HFA) 45 MCG/ACT inhaler 2 puffs, Inhalation, EVERY 6 HOURS PRN    losartan (COZAAR) 25 MG tablet TAKE 1 TABLET BY MOUTH ONCE NIGHTLY. LYSINE 1,000 mg, Oral, 2 TIMES DAILY PRN    metFORMIN (GLUCOPHAGE-XR) 500 MG extended release tablet TAKE 4 TABLETS BY MOUTH IN THE EVENING.    montelukast (SINGULAIR) 10 mg, Oral, NIGHTLY    Multiple Vitamins-Minerals (THERAPEUTIC MULTIVITAMIN-MINERALS) tablet 1 tablet, Oral, DAILY    nitroGLYCERIN (NITROSTAT) 0.4 mg, SubLINGual, EVERY 5 MIN PRN    omeprazole (PRILOSEC) 40 MG delayed release capsule TAKE 1 CAPSULE BY MOUTH IN THE MORNING BEFORE BREAKFAST.    rizatriptan (MAXALT) 10 mg, Oral, ONCE PRN, May repeat in 2 hours if needed    rosuvastatin (CRESTOR) 40 mg, Oral, EVERY EVENING    tiZANidine (ZANAFLEX) 4 MG tablet TAKE 1 TABLET BY MOUTH NIGHTLY AT BEDTIME AS NEEDED    topiramate (TOPAMAX) 50 MG tablet TAKE 1 TABLET BY MOUTH IN THE MORNING AND TAKE 2 TABLETS BY MOUTH IN THE AFTERNOON    venlafaxine (EFFEXOR XR) 150 MG extended release capsule TAKE 1 CAPSULE BY MOUTH 2 TIMES DAILY.        Allergies   Allergen Reactions    Bactrim Other (See Comments)     Metallic taste    Iv Dye [Iodides] Nausea Only and Other (See Comments)     Says older IVP dye causes Stomach pains    Tolectin [Tolmetin Sodium] Other (See Comments)     Metallic taste    Claforan [Cefotaxime Sodium In D5w] Hives and Rash    Pcn [Penicillins] Hives and Rash       Review of Systems:   Constitutional: negative for weight changes or fevers  Genitourinary: negative for bowel/bladder incontinence   Musculoskeletal: positive for neck pain  Neurological: positive for migraines but negative for any arm weakness  Behavioral/Psych: negative for anxiety/depression   All other systems reviewed and are negative    Objective:     Vitals:    03/10/23 0842   BP: (!) 134/92       Constitutional: Pleasant, no acute distress   Head: Normocephalic, atraumatic   Eyes: Conjunctivae normal   Neck: Supple, symmetrical   Respiration: Non-labored breathing Cardiovascular: Limbs warm and well perfused   Musculoskeletal: Reduced cervical ROM in all planes. Negative Spurling maneuver bilaterally. Increased pain with facet loading on RIGHT. Tenderness to palpation in RIGHT cervical paraspinals. Neuro: Alert, oriented. CN II-XII appear grossly intact. No focal upper limbs deficits. Allodynia over right posterior scalp region. Skin: healed anterior neck incision scar  Psychological: Cooperative, no exaggerated pain behaviors         Assessment:    Diagnosis Orders   1. Intractable chronic migraine without aura and without status migrainosus [G43.719 (ICD-10-CM)]        2. Cervical spondylosis        3. Occipital neuralgia of right side              Lukas Bailey is a 78 y. o.female presenting to the pain clinic for evaluation of chronic right-sided neck pain and migraines. Her clinical history and physical examination are consistent with occipital neuralgia in both the greater and lesser occipital nerve distribution on the right. She underwent a right occipital nerve block in clinic. We will follow-up in 6 weeks. We may potentially pursue Botox injections versus right C2/C3 (third occipital nerve) block if she fails to respond. She had a good response to her occipital nerve block and failed to respond to her cervical repeat the ablation. She underwent successful first round of Botox injections. I set her up for repeat Botox injections in 6 weeks and refilled her Flexeril. Plan: The following treatment recommendations and plan were discussed in detail with Lukas Bailey. Imaging/Studies/Labs:   I have reviewed patients imaging of MRI C-spine. Analgesics:   Patient is taking Excedrin as needed. Patient is advised to take as prescribed and not take on an empty stomach. Adjuvants: In light of the presence of a neuropathic component of pain, patient can continue Topamax twice daily.     For her associated myofascial pain and muscle spasm, patient can continue Flexeril 10 mg up to 3 times a day as needed. Interventions: In the light of patient's clinical history and suboptimal response to multiple treatment modalities as detailed above, we discussed the option of using onabotulinumtoxinA (Botox) injection for better management of chronic migraine headaches. The risks and benefits were discussed in detail with the patient. Patient wants to proceed with this injection at her next visit. Anticoagulation/NPO Recommendations:   None    Multidisciplinary Pain Management:   In the presence of complex, chronic, and multi-factorial pain, the importance of a multidisciplinary approach to pain management in the patients management regimen was emphasized and discussed in great detail.      Referrals:  None    Prescriptions Written This Visit:   Flexeril 10 mg    Follow-up: For Botox injections      Marielos Michael,   Interventional Pain Management/PM&R   [place]

## 2023-04-18 RX ORDER — LOSARTAN POTASSIUM 25 MG/1
TABLET ORAL
Qty: 90 TABLET | Refills: 1 | OUTPATIENT
Start: 2023-04-18

## 2023-04-19 ENCOUNTER — OFFICE VISIT (OUTPATIENT)
Dept: FAMILY MEDICINE CLINIC | Age: 68
End: 2023-04-19

## 2023-04-19 VITALS
OXYGEN SATURATION: 98 % | DIASTOLIC BLOOD PRESSURE: 88 MMHG | HEIGHT: 68 IN | TEMPERATURE: 97.5 F | HEART RATE: 85 BPM | WEIGHT: 175.4 LBS | BODY MASS INDEX: 26.58 KG/M2 | SYSTOLIC BLOOD PRESSURE: 136 MMHG | RESPIRATION RATE: 16 BRPM

## 2023-04-19 DIAGNOSIS — J44.9 ASTHMA-COPD OVERLAP SYNDROME (HCC): ICD-10-CM

## 2023-04-19 DIAGNOSIS — R73.03 PREDIABETES: ICD-10-CM

## 2023-04-19 DIAGNOSIS — E78.2 MIXED HYPERLIPIDEMIA: ICD-10-CM

## 2023-04-19 DIAGNOSIS — R51.9 RIGHT-SIDED HEADACHE: Primary | ICD-10-CM

## 2023-04-19 DIAGNOSIS — E55.9 VITAMIN D DEFICIENCY: ICD-10-CM

## 2023-04-19 DIAGNOSIS — R53.83 FATIGUE, UNSPECIFIED TYPE: ICD-10-CM

## 2023-04-19 DIAGNOSIS — I10 ESSENTIAL HYPERTENSION: ICD-10-CM

## 2023-04-19 DIAGNOSIS — F33.41 RECURRENT MAJOR DEPRESSIVE DISORDER, IN PARTIAL REMISSION (HCC): ICD-10-CM

## 2023-04-19 DIAGNOSIS — Z12.31 ENCOUNTER FOR SCREENING MAMMOGRAM FOR MALIGNANT NEOPLASM OF BREAST: ICD-10-CM

## 2023-04-19 DIAGNOSIS — I25.10 CORONARY ARTERY DISEASE INVOLVING NATIVE CORONARY ARTERY OF NATIVE HEART WITHOUT ANGINA PECTORIS: ICD-10-CM

## 2023-04-19 DIAGNOSIS — Z86.79 H/O CLASS III ANGINA PECTORIS: ICD-10-CM

## 2023-04-19 PROBLEM — E11.9 TYPE 2 DIABETES MELLITUS (HCC): Status: ACTIVE | Noted: 2023-04-19

## 2023-04-19 RX ORDER — RIZATRIPTAN BENZOATE 10 MG/1
10 TABLET ORAL
Qty: 36 TABLET | Refills: 1 | Status: SHIPPED | OUTPATIENT
Start: 2023-04-19 | End: 2023-04-21

## 2023-04-19 RX ORDER — TOPIRAMATE 100 MG/1
100 TABLET, FILM COATED ORAL 2 TIMES DAILY
Qty: 60 TABLET | Refills: 3 | Status: SHIPPED | OUTPATIENT
Start: 2023-04-19

## 2023-04-19 RX ORDER — CYCLOBENZAPRINE HCL 10 MG
10 TABLET ORAL 3 TIMES DAILY PRN
COMMUNITY

## 2023-04-19 SDOH — ECONOMIC STABILITY: INCOME INSECURITY: HOW HARD IS IT FOR YOU TO PAY FOR THE VERY BASICS LIKE FOOD, HOUSING, MEDICAL CARE, AND HEATING?: NOT HARD AT ALL

## 2023-04-19 SDOH — ECONOMIC STABILITY: FOOD INSECURITY: WITHIN THE PAST 12 MONTHS, YOU WORRIED THAT YOUR FOOD WOULD RUN OUT BEFORE YOU GOT MONEY TO BUY MORE.: NEVER TRUE

## 2023-04-19 SDOH — ECONOMIC STABILITY: HOUSING INSECURITY
IN THE LAST 12 MONTHS, WAS THERE A TIME WHEN YOU DID NOT HAVE A STEADY PLACE TO SLEEP OR SLEPT IN A SHELTER (INCLUDING NOW)?: NO

## 2023-04-19 SDOH — ECONOMIC STABILITY: FOOD INSECURITY: WITHIN THE PAST 12 MONTHS, THE FOOD YOU BOUGHT JUST DIDN'T LAST AND YOU DIDN'T HAVE MONEY TO GET MORE.: NEVER TRUE

## 2023-04-19 ASSESSMENT — PATIENT HEALTH QUESTIONNAIRE - PHQ9
10. IF YOU CHECKED OFF ANY PROBLEMS, HOW DIFFICULT HAVE THESE PROBLEMS MADE IT FOR YOU TO DO YOUR WORK, TAKE CARE OF THINGS AT HOME, OR GET ALONG WITH OTHER PEOPLE: 0
SUM OF ALL RESPONSES TO PHQ QUESTIONS 1-9: 4
9. THOUGHTS THAT YOU WOULD BE BETTER OFF DEAD, OR OF HURTING YOURSELF: 0
SUM OF ALL RESPONSES TO PHQ9 QUESTIONS 1 & 2: 0
SUM OF ALL RESPONSES TO PHQ QUESTIONS 1-9: 4
5. POOR APPETITE OR OVEREATING: 0
1. LITTLE INTEREST OR PLEASURE IN DOING THINGS: 0
SUM OF ALL RESPONSES TO PHQ QUESTIONS 1-9: 4
2. FEELING DOWN, DEPRESSED OR HOPELESS: 0
7. TROUBLE CONCENTRATING ON THINGS, SUCH AS READING THE NEWSPAPER OR WATCHING TELEVISION: 0
3. TROUBLE FALLING OR STAYING ASLEEP: 2
SUM OF ALL RESPONSES TO PHQ QUESTIONS 1-9: 4
6. FEELING BAD ABOUT YOURSELF - OR THAT YOU ARE A FAILURE OR HAVE LET YOURSELF OR YOUR FAMILY DOWN: 0
4. FEELING TIRED OR HAVING LITTLE ENERGY: 2
8. MOVING OR SPEAKING SO SLOWLY THAT OTHER PEOPLE COULD HAVE NOTICED. OR THE OPPOSITE, BEING SO FIGETY OR RESTLESS THAT YOU HAVE BEEN MOVING AROUND A LOT MORE THAN USUAL: 0

## 2023-04-19 ASSESSMENT — ENCOUNTER SYMPTOMS
ABDOMINAL PAIN: 0
CONSTIPATION: 0
NAUSEA: 0
DIARRHEA: 0
SHORTNESS OF BREATH: 0
VOMITING: 0
BACK PAIN: 0
WHEEZING: 0
COUGH: 0

## 2023-04-19 NOTE — PROGRESS NOTES
[id number] [facility] [address] [address]  Dept: [phone]  Loc: [phone]      Please see Resident note for complete HPI. Migraines - uncontrolled - on Topamax 50 mg in am, 100 mg QHS, Maxalt twice weekly, Botox and follows with pain management. CAD with stent - follows with Dr. Sierra Dejesus  Depression controlled. Weaned self off Abilify.   Otherwise doing well  Needs up-to-date labs, mammogram    ROS per Resident    Lab Results   Component Value Date    WBC 10.9 (H) 12/13/2021    HGB 14.8 12/13/2021    HCT 47.6 (H) 12/13/2021    MCV 95.4 12/13/2021     12/13/2021     Lab Results   Component Value Date     12/13/2021    K 4.5 12/13/2021     12/13/2021    CO2 26 12/13/2021    BUN 9 12/13/2021    CREATININE 0.9 05/05/2022    GLUCOSE 92 12/13/2021    CALCIUM 10.0 12/13/2021    PROT 6.8 12/13/2021    LABALBU 4.5 12/13/2021    BILITOT 0.3 12/13/2021    ALKPHOS 92 12/13/2021    AST 16 12/13/2021    ALT 15 12/13/2021    LABGLOM 63 (A) 05/05/2022     Lab Results   Component Value Date    LABA1C 5.6 03/15/2021     No results found for: EAG  Lab Results   Component Value Date    LABMICR < 1.20 12/13/2021     Lab Results   Component Value Date    TSH 0.586 12/13/2021    T4FREE 1.04 11/24/2020     Lab Results   Component Value Date    CHOL 186 12/13/2021    CHOL 187 07/21/2021    CHOL 155 11/24/2020     Lab Results   Component Value Date    TRIG 299 (H) 12/13/2021    TRIG 292 (H) 07/21/2021    TRIG 203 (H) 11/24/2020     Lab Results   Component Value Date    HDL 40 12/13/2021    HDL 44 07/21/2021    HDL 43 11/24/2020     Lab Results   Component Value Date    LDLCALC 86 12/13/2021    LDLCALC 85 07/21/2021    LDLCALC 71 11/24/2020     No results found for: LABVLDL, VLDL  No results found for: CHOLHDLRATIO  No results found for: PSA, PSADIA    Health Maintenance Due   Topic Date Due    Diabetic foot exam  Never done
Health Maintenance Due   Topic Date Due    COVID-19 Vaccine (4 - Booster for Pfizer series) 02/01/2022    Lipids  12/13/2022    Depression Monitoring  12/22/2022    Annual Wellness Visit (AWV)  12/23/2022
12/23/2022         Attending Physician Statement  I have discussed the case, including pertinent history and exam findings with the resident. I also have seen the patient and performed key portions of the examination. I agree with the documented assessment and plan as documented by the resident.   GC modifier added to this encounter      Jasper Bowers MD 4/19/2023 2:00 PM
Kameron Corrales (age 72 y+), High Dose, 0.7mL 10/03/2022    Pneumococcal, PCV-13, PREVNAR 13, (age 6w+), IM, 0.5mL 06/25/2015    Pneumococcal, PPSV23, PNEUMOVAX 23, (age 2y+), SC/IM, 0.5mL 05/13/2005, 12/22/2021    TDaP, ADACEL (age 10y-63y), BOOSTRIX (age 10y+), IM, 0.5mL 09/30/2009, 09/17/2018    Td vaccine (adult) 03/26/1998    Zoster Live (Zostavax) 10/15/2013    Zoster Recombinant (Shingrix) 09/17/2018, 10/18/2019       Health Maintenance Due   Topic Date Due    Diabetic foot exam  Never done    Diabetic retinal exam  Never done    GFR test (Diabetes, CKD 3-4, OR last GFR 15-59)  Never done    COVID-19 Vaccine (4 - Booster for Pfizer series) 02/01/2022    Diabetic Alb to Cr ratio (uACR) test  12/13/2022    Lipids  12/13/2022    Depression Monitoring  12/22/2022    Annual Wellness Visit (AWV)  12/23/2022       Food Insecurity: No Food Insecurity    Worried About Running Out of Food in the Last Year: Never true    Ran Out of Food in the Last Year: Never true       Assessment / Plan:   1. Right-sided headache  Chronic, uncontrolled. Symptoms related with migraine type headache.  -Will increase Topamax to 100 mg twice daily  -Refill Maxalt to be used as abortive therapy  -Given symptoms are uncontrolled despite prophylactic medication, and several addition normal treatment options through pain management, will also refer patient to tertiary headache clinic at this time. - Magnesium; Future  - rizatriptan (MAXALT) 10 MG tablet; Take 1 tablet by mouth once as needed for Migraine May repeat in 2 hours if needed  Dispense: 36 tablet; Refill: 1  - topiramate (TOPAMAX) 100 MG tablet; Take 1 tablet by mouth 2 times daily  Dispense: 60 tablet; Refill: 3  - External Referral To Neurology    2. Essential hypertension  Chronic, controlled. Continue current medications. - CBC with Auto Differential; Future  - Comprehensive Metabolic Panel; Future    3.  Recurrent major depressive disorder, in partial remission (HCC)  Chronic,

## 2023-04-21 ENCOUNTER — OFFICE VISIT (OUTPATIENT)
Dept: PHYSICAL MEDICINE AND REHAB | Age: 68
End: 2023-04-21

## 2023-04-21 ENCOUNTER — NURSE ONLY (OUTPATIENT)
Dept: LAB | Age: 68
End: 2023-04-21

## 2023-04-21 VITALS
DIASTOLIC BLOOD PRESSURE: 76 MMHG | SYSTOLIC BLOOD PRESSURE: 112 MMHG | BODY MASS INDEX: 26.52 KG/M2 | WEIGHT: 175 LBS | HEIGHT: 68 IN

## 2023-04-21 DIAGNOSIS — R73.03 PREDIABETES: ICD-10-CM

## 2023-04-21 DIAGNOSIS — R51.9 RIGHT-SIDED HEADACHE: ICD-10-CM

## 2023-04-21 DIAGNOSIS — I25.10 CORONARY ARTERY DISEASE INVOLVING NATIVE CORONARY ARTERY OF NATIVE HEART WITHOUT ANGINA PECTORIS: ICD-10-CM

## 2023-04-21 DIAGNOSIS — I10 ESSENTIAL HYPERTENSION: ICD-10-CM

## 2023-04-21 DIAGNOSIS — M47.812 CERVICAL SPONDYLOSIS: ICD-10-CM

## 2023-04-21 DIAGNOSIS — G43.719 INTRACTABLE CHRONIC MIGRAINE WITHOUT AURA AND WITHOUT STATUS MIGRAINOSUS: Primary | ICD-10-CM

## 2023-04-21 DIAGNOSIS — E55.9 VITAMIN D DEFICIENCY: ICD-10-CM

## 2023-04-21 DIAGNOSIS — R53.83 FATIGUE, UNSPECIFIED TYPE: ICD-10-CM

## 2023-04-21 DIAGNOSIS — F33.41 RECURRENT MAJOR DEPRESSIVE DISORDER, IN PARTIAL REMISSION (HCC): ICD-10-CM

## 2023-04-21 LAB
25(OH)D3 SERPL-MCNC: 37 NG/ML (ref 30–100)
ALBUMIN SERPL BCG-MCNC: 4.3 G/DL (ref 3.5–5.1)
ALP SERPL-CCNC: 81 U/L (ref 38–126)
ALT SERPL W/O P-5'-P-CCNC: 13 U/L (ref 11–66)
ANION GAP SERPL CALC-SCNC: 11 MEQ/L (ref 8–16)
AST SERPL-CCNC: 16 U/L (ref 5–40)
BASOPHILS ABSOLUTE: 0.1 THOU/MM3 (ref 0–0.1)
BASOPHILS NFR BLD AUTO: 1.2 %
BILIRUB SERPL-MCNC: 0.2 MG/DL (ref 0.3–1.2)
BUN SERPL-MCNC: 12 MG/DL (ref 7–22)
CALCIUM SERPL-MCNC: 9.4 MG/DL (ref 8.5–10.5)
CHLORIDE SERPL-SCNC: 108 MEQ/L (ref 98–111)
CHOLEST SERPL-MCNC: 144 MG/DL (ref 100–199)
CO2 SERPL-SCNC: 22 MEQ/L (ref 23–33)
CREAT SERPL-MCNC: 1 MG/DL (ref 0.4–1.2)
CREAT UR-MCNC: 56.7 MG/DL
DEPRECATED MEAN GLUCOSE BLD GHB EST-ACNC: 114 MG/DL (ref 70–126)
DEPRECATED RDW RBC AUTO: 47.6 FL (ref 35–45)
EOSINOPHIL NFR BLD AUTO: 3.3 %
EOSINOPHILS ABSOLUTE: 0.3 THOU/MM3 (ref 0–0.4)
ERYTHROCYTE [DISTWIDTH] IN BLOOD BY AUTOMATED COUNT: 13.9 % (ref 11.5–14.5)
GFR SERPL CREATININE-BSD FRML MDRD: > 60 ML/MIN/1.73M2
GLUCOSE SERPL-MCNC: 92 MG/DL (ref 70–108)
HBA1C MFR BLD HPLC: 5.8 % (ref 4.4–6.4)
HCT VFR BLD AUTO: 45.4 % (ref 37–47)
HDLC SERPL-MCNC: 51 MG/DL
HGB BLD-MCNC: 13.7 GM/DL (ref 12–16)
IMM GRANULOCYTES # BLD AUTO: 0.1 THOU/MM3 (ref 0–0.07)
IMM GRANULOCYTES NFR BLD AUTO: 1.1 %
LDLC SERPL CALC-MCNC: 58 MG/DL
LYMPHOCYTES ABSOLUTE: 2.3 THOU/MM3 (ref 1–4.8)
LYMPHOCYTES NFR BLD AUTO: 25.9 %
MAGNESIUM SERPL-MCNC: 1.9 MG/DL (ref 1.6–2.4)
MCH RBC QN AUTO: 28.3 PG (ref 26–33)
MCHC RBC AUTO-ENTMCNC: 30.2 GM/DL (ref 32.2–35.5)
MCV RBC AUTO: 93.8 FL (ref 81–99)
MICROALBUMIN UR-MCNC: < 1.2 MG/DL
MICROALBUMIN/CREAT RATIO PNL UR: 21 MG/G (ref 0–30)
MONOCYTES ABSOLUTE: 0.5 THOU/MM3 (ref 0.4–1.3)
MONOCYTES NFR BLD AUTO: 5.4 %
NEUTROPHILS NFR BLD AUTO: 63.1 %
NRBC BLD AUTO-RTO: 0 /100 WBC
PLATELET # BLD AUTO: 372 THOU/MM3 (ref 130–400)
PMV BLD AUTO: 10.2 FL (ref 9.4–12.4)
POTASSIUM SERPL-SCNC: 4.2 MEQ/L (ref 3.5–5.2)
PROT SERPL-MCNC: 6.7 G/DL (ref 6.1–8)
RBC # BLD AUTO: 4.84 MILL/MM3 (ref 4.2–5.4)
SEGMENTED NEUTROPHILS ABSOLUTE COUNT: 5.7 THOU/MM3 (ref 1.8–7.7)
SODIUM SERPL-SCNC: 141 MEQ/L (ref 135–145)
T4 FREE SERPL-MCNC: 1.1 NG/DL (ref 0.93–1.76)
TRIGL SERPL-MCNC: 177 MG/DL (ref 0–199)
TSH SERPL DL<=0.005 MIU/L-ACNC: 1.06 UIU/ML (ref 0.4–4.2)
WBC # BLD AUTO: 9 THOU/MM3 (ref 4.8–10.8)

## 2023-04-21 NOTE — PROGRESS NOTES
Pre-operative Diagnosis: Chronic Migraine Headaches     Post-operative Diagnosis: Chronic Migraine Headaches     Procedure: Botox for migraine headaches     Procedure Description:  Patient was reclined on the examination table. Botox was drawn up into a tuberculin syringes, to a concentration of 5 units per 0.1 mL with a 30-gauge needle. Skin was prepped with alcohol wipes. The following muscles were injected after negative aspiration; The frontalis muscle bilaterally a total of 4 sites (20 units), The  muscle bilaterally a total of 2 sites (10 units), the procerus one site (5 units), the occipitalis bilaterally a total of 6 sites (30 units), The temporalis muscle bilaterally a total of 8 sites (40 units), the massetter muscles bilaterally (20 units), the trapezius bilaterally a total of 6 sites (30 units), and cervical paraspinal muscle groups a total of (45 units). This was a total of 200 units. The patient tolerated the procedure well.     Medications: 4 mL in 200 U Botox drawn up in 4 separate 1 mL TB syringes = 5 U per 0.1 mL syringe    Amount medication wasted: 0 units        Procedural Complications: None        Frankie Garber DO  Interventional Pain Management/PM&R   [place]
with the patient. Patient underwent Botox injections in clinic today. Anticoagulation/NPO Recommendations:   None    Multidisciplinary Pain Management:   In the presence of complex, chronic, and multi-factorial pain, the importance of a multidisciplinary approach to pain management in the patients management regimen was emphasized and discussed in great detail.      Referrals:  None    Prescriptions Written This Visit:   None    Follow-up: 12 weeks (For Botox injections)      Murray Saldana,   Interventional Pain Management/PM&R   [place]

## 2023-04-25 ENCOUNTER — TELEPHONE (OUTPATIENT)
Dept: PULMONOLOGY | Age: 68
End: 2023-04-25

## 2023-04-25 DIAGNOSIS — G47.419 PRIMARY NARCOLEPSY WITHOUT CATAPLEXY: ICD-10-CM

## 2023-04-25 RX ORDER — METHYLPHENIDATE HYDROCHLORIDE 10 MG/1
10 TABLET ORAL 2 TIMES DAILY
Qty: 180 TABLET | Refills: 0 | Status: SHIPPED | OUTPATIENT
Start: 2023-04-25 | End: 2023-07-24

## 2023-04-25 RX ORDER — ARMODAFINIL 200 MG/1
200 TABLET ORAL DAILY
Qty: 30 TABLET | Refills: 5 | Status: SHIPPED | OUTPATIENT
Start: 2023-04-25 | End: 2023-05-26

## 2023-04-25 NOTE — TELEPHONE ENCOUNTER
Patient called in requesting refill of Nuvigil and Ritalin to go to [facility].    Nuvigil last order 12/30/22  Ritalin Last ordered 9/19/22  Last visit 9/19/22  Next visit 9/25/23

## 2023-04-27 ENCOUNTER — TELEPHONE (OUTPATIENT)
Dept: FAMILY MEDICINE CLINIC | Age: 68
End: 2023-04-27

## 2023-05-02 ENCOUNTER — HOSPITAL ENCOUNTER (OUTPATIENT)
Dept: WOMENS IMAGING | Age: 68
Discharge: HOME | End: 2023-05-02
Payer: MEDICARE

## 2023-05-02 DIAGNOSIS — Z12.31 ENCOUNTER FOR SCREENING MAMMOGRAM FOR MALIGNANT NEOPLASM OF BREAST: ICD-10-CM

## 2023-05-02 PROCEDURE — 77063 BREAST TOMOSYNTHESIS BI: CPT

## 2023-05-03 ENCOUNTER — TELEPHONE (OUTPATIENT)
Dept: FAMILY MEDICINE CLINIC | Age: 68
End: 2023-05-03

## 2023-05-03 NOTE — TELEPHONE ENCOUNTER
Message  Received: Today  Too Trinh MD  [address] Staff  Please see below message. System unable to push referral to [facility]. Please fax referral information per patient's request. Thank you!

## 2023-05-03 NOTE — TELEPHONE ENCOUNTER
Pt has an appointment with [address] [place] [address] neurology on 7/13/23 per referral.

## 2023-05-08 ENCOUNTER — TELEPHONE (OUTPATIENT)
Dept: FAMILY MEDICINE CLINIC | Age: 68
End: 2023-05-08

## 2023-05-08 NOTE — TELEPHONE ENCOUNTER
----- Message from Ricky Baez MD sent at 5/3/2023 11:04 AM EDT -----  Katie Patton,    Your mammogram appears negative. We will repeat in 1 year. Thank you!

## 2023-05-15 RX ORDER — VENLAFAXINE HYDROCHLORIDE 150 MG/1
CAPSULE, EXTENDED RELEASE ORAL
Qty: 180 CAPSULE | Refills: 1 | Status: SHIPPED | OUTPATIENT
Start: 2023-05-15

## 2023-05-15 NOTE — TELEPHONE ENCOUNTER
Patient's last appointment was : 4/19/23  Patient's next appointment is :  8/3/23  Last refilled: 11/4/22  [address] Verified    Lab Results   Component Value Date    LABA1C 5.8 04/21/2023     Lab Results   Component Value Date    CHOL 144 04/21/2023    TRIG 177 04/21/2023    HDL 51 04/21/2023    LDLCALC 58 04/21/2023     Lab Results   Component Value Date     04/21/2023    K 4.2 04/21/2023     04/21/2023    CO2 22 (L) 04/21/2023    BUN 12 04/21/2023    CREATININE 1.0 04/21/2023    GLUCOSE 92 04/21/2023    CALCIUM 9.4 04/21/2023    PROT 6.7 04/21/2023    LABALBU 4.3 04/21/2023    BILITOT 0.2 (L) 04/21/2023    ALKPHOS 81 04/21/2023    AST 16 04/21/2023    ALT 13 04/21/2023    LABGLOM >60 04/21/2023     Lab Results   Component Value Date    TSH 1.060 04/21/2023    T4FREE 1.10 04/21/2023     Lab Results   Component Value Date    WBC 9.0 04/21/2023    HGB 13.7 04/21/2023    HCT 45.4 04/21/2023    MCV 93.8 04/21/2023     04/21/2023

## 2023-07-12 DIAGNOSIS — J45.30 MILD PERSISTENT ASTHMA WITHOUT COMPLICATION: ICD-10-CM

## 2023-07-12 RX ORDER — MONTELUKAST SODIUM 10 MG/1
TABLET ORAL
Qty: 30 TABLET | Refills: 11 | Status: SHIPPED | OUTPATIENT
Start: 2023-07-12

## 2023-07-12 NOTE — TELEPHONE ENCOUNTER
Received refill request for montelukast (SINGULAIR) 10 MG tablet. Medication was last ordered by Kishore Rodriguez. Medication was last ordered on 7/22/22 with 11 refills. Patient was last seen in the office 10/17/22. Does patient have a scheduled follow up?: yes - 10/17/23. Medication needs to be sent to [address]. Thank you, please advise! Patient's Allergies:   Allergies   Allergen Reactions    Bactrim Other (See Comments)     Metallic taste    Iv Dye [Iodides] Nausea Only and Other (See Comments)     Says older IVP dye causes Stomach pains    Tolectin [Tolmetin Sodium] Other (See Comments)     Metallic taste    Claforan [Cefotaxime Sodium In D5w] Hives and Rash    Pcn [Penicillins] Hives and Rash

## 2023-07-13 NOTE — TELEPHONE ENCOUNTER
Phoned patient and left message to notify of refill sent to [address]. Left office number for patient to call with any questions.

## 2023-07-14 ENCOUNTER — OFFICE VISIT (OUTPATIENT)
Dept: PHYSICAL MEDICINE AND REHAB | Age: 68
End: 2023-07-14

## 2023-07-14 VITALS
WEIGHT: 169 LBS | DIASTOLIC BLOOD PRESSURE: 80 MMHG | HEIGHT: 68 IN | BODY MASS INDEX: 25.61 KG/M2 | SYSTOLIC BLOOD PRESSURE: 110 MMHG

## 2023-07-14 DIAGNOSIS — G89.29 OTHER CHRONIC PAIN: ICD-10-CM

## 2023-07-14 DIAGNOSIS — M54.81 OCCIPITAL NEURALGIA OF RIGHT SIDE: ICD-10-CM

## 2023-07-14 DIAGNOSIS — M47.812 CERVICAL SPONDYLOSIS: ICD-10-CM

## 2023-07-14 DIAGNOSIS — G43.719 INTRACTABLE CHRONIC MIGRAINE WITHOUT AURA AND WITHOUT STATUS MIGRAINOSUS: Primary | ICD-10-CM

## 2023-09-14 DIAGNOSIS — G47.419 PRIMARY NARCOLEPSY WITHOUT CATAPLEXY: Primary | ICD-10-CM

## 2023-09-14 RX ORDER — METHYLPHENIDATE HYDROCHLORIDE 10 MG/1
10 TABLET ORAL 2 TIMES DAILY
COMMUNITY
End: 2023-09-15 | Stop reason: SDUPTHER

## 2023-09-14 RX ORDER — ARMODAFINIL 200 MG/1
TABLET ORAL
COMMUNITY
End: 2023-09-15 | Stop reason: SDUPTHER

## 2023-09-14 NOTE — TELEPHONE ENCOUNTER
Received refill request for Ritalin. Medication was last ordered by Fransico Price. Medication was last ordered on 4/25/23 with 0 refills. Patient was last seen in the office 9/19/22. Patient has a scheduled follow up 9/25/23. Medication needs to be sent to [facility]. Received refill request for Nuvigil. Medication was last ordered by Fransico Price. Medication was last ordered on 4/25/23 with 5 refills. Patient was last seen in the office 9/19/23. Patient has a scheduled follow up 9/25/23. Medication needs to be sent to [facility].

## 2023-09-14 NOTE — TELEPHONE ENCOUNTER
Received refill request for Ritalin. Medication was last ordered by Danay Osborne. Medication was last ordered on 4/25/23 with 0 refills. Patient was last seen in the office 9/19/22. Patient has a scheduled follow up 9/25/23. Medication needs to be sent to [facility]. Received refill request for Nuvigil. Medication was last ordered by Danay Osborne. Medication was last ordered on 4/25/23 with 5 refills. Patient was last seen in the office 9/19/23. Patient has a scheduled follow up 9/25/23. Medication needs to be sent to [facility].

## 2023-09-15 RX ORDER — METHYLPHENIDATE HYDROCHLORIDE 10 MG/1
10 TABLET ORAL 2 TIMES DAILY
Qty: 60 TABLET | Refills: 0 | Status: SHIPPED | OUTPATIENT
Start: 2023-09-15 | End: 2023-10-15

## 2023-09-15 RX ORDER — ARMODAFINIL 200 MG/1
200 TABLET ORAL DAILY
Qty: 30 TABLET | Refills: 5 | Status: SHIPPED | OUTPATIENT
Start: 2023-09-15 | End: 2024-03-13

## 2023-09-25 ENCOUNTER — OFFICE VISIT (OUTPATIENT)
Dept: PULMONOLOGY | Age: 68
End: 2023-09-25
Payer: MEDICARE

## 2023-09-25 ENCOUNTER — TELEPHONE (OUTPATIENT)
Dept: PULMONOLOGY | Age: 68
End: 2023-09-25

## 2023-09-25 VITALS
HEIGHT: 68 IN | SYSTOLIC BLOOD PRESSURE: 122 MMHG | TEMPERATURE: 98.3 F | DIASTOLIC BLOOD PRESSURE: 78 MMHG | HEART RATE: 84 BPM | OXYGEN SATURATION: 97 % | WEIGHT: 161.2 LBS | BODY MASS INDEX: 24.43 KG/M2

## 2023-09-25 DIAGNOSIS — G47.419 PRIMARY NARCOLEPSY WITHOUT CATAPLEXY: Primary | ICD-10-CM

## 2023-09-25 DIAGNOSIS — G47.10 HYPERSOMNIA: ICD-10-CM

## 2023-09-25 PROCEDURE — 3017F COLORECTAL CA SCREEN DOC REV: CPT | Performed by: PHYSICIAN ASSISTANT

## 2023-09-25 PROCEDURE — G8399 PT W/DXA RESULTS DOCUMENT: HCPCS | Performed by: PHYSICIAN ASSISTANT

## 2023-09-25 PROCEDURE — 99214 OFFICE O/P EST MOD 30 MIN: CPT | Performed by: PHYSICIAN ASSISTANT

## 2023-09-25 PROCEDURE — 1123F ACP DISCUSS/DSCN MKR DOCD: CPT | Performed by: PHYSICIAN ASSISTANT

## 2023-09-25 PROCEDURE — G8427 DOCREV CUR MEDS BY ELIG CLIN: HCPCS | Performed by: PHYSICIAN ASSISTANT

## 2023-09-25 PROCEDURE — 1036F TOBACCO NON-USER: CPT | Performed by: PHYSICIAN ASSISTANT

## 2023-09-25 PROCEDURE — 1090F PRES/ABSN URINE INCON ASSESS: CPT | Performed by: PHYSICIAN ASSISTANT

## 2023-09-25 PROCEDURE — G8420 CALC BMI NORM PARAMETERS: HCPCS | Performed by: PHYSICIAN ASSISTANT

## 2023-09-25 RX ORDER — SOLRIAMFETOL 150 MG/1
TABLET, FILM COATED ORAL
Qty: 30 TABLET | Refills: 1 | Status: SHIPPED | OUTPATIENT
Start: 2023-09-25

## 2023-09-25 NOTE — PROGRESS NOTES
weight. She is not ill-appearing. HENT:      Head: Normocephalic and atraumatic. Nose: Nose normal.   Eyes:      Extraocular Movements: Extraocular movements intact. Pupils: Pupils are equal, round, and reactive to light. Pulmonary:      Effort: Pulmonary effort is normal.   Neurological:      Mental Status: She is alert and oriented to person, place, and time. Psychiatric:         Attention and Perception: Attention and perception normal.         Mood and Affect: Mood and affect normal.         Speech: Speech normal.         Behavior: Behavior normal.         Thought Content: Thought content normal.         Cognition and Memory: Cognition and memory normal.         Judgment: Judgment normal.            Assessment      Diagnosis Orders   1. Primary narcolepsy without cataplexy  Solriamfetol HCl (SUNOSI) 150 MG TABS      2. Hypersomnia               Plan   - will try to see can get Sunosi approved  - If unable to afford will consider Wajosex or Favio Ram  - She has failed Xyrem, sodium made her nauseous.   - She has failed Provigil and Adderal in this past also   - She call my officefor earlier appointment if needed for worsening of sleep symptoms.   - She was instructed on weight loss  - Peter Jimenez was educated about my impression and plan. Patient verbalizes understanding.   We will see Chelly Fitch back in: 2 months     Kit Bowen PA-C  9/25/2023

## 2023-09-25 NOTE — TELEPHONE ENCOUNTER
Submitted prior authorization for [employer] today. -  PA Case: [id number], Status: Approved, Coverage Starts on: 6/26/2023 - Coverage Ends on: 9/24/2024   -  PA approved, signing encounter.

## 2023-10-05 DIAGNOSIS — I10 ESSENTIAL HYPERTENSION: ICD-10-CM

## 2023-10-05 DIAGNOSIS — E88.810 METABOLIC SYNDROME: ICD-10-CM

## 2023-10-06 ENCOUNTER — OFFICE VISIT (OUTPATIENT)
Dept: PHYSICAL MEDICINE AND REHAB | Age: 68
End: 2023-10-06

## 2023-10-06 VITALS
BODY MASS INDEX: 24.4 KG/M2 | WEIGHT: 161 LBS | DIASTOLIC BLOOD PRESSURE: 84 MMHG | HEIGHT: 68 IN | SYSTOLIC BLOOD PRESSURE: 116 MMHG

## 2023-10-06 DIAGNOSIS — M79.18 MYOFASCIAL PAIN: ICD-10-CM

## 2023-10-06 DIAGNOSIS — G89.29 OTHER CHRONIC PAIN: ICD-10-CM

## 2023-10-06 DIAGNOSIS — G43.719 INTRACTABLE CHRONIC MIGRAINE WITHOUT AURA AND WITHOUT STATUS MIGRAINOSUS: Primary | ICD-10-CM

## 2023-10-06 DIAGNOSIS — M47.812 CERVICAL SPONDYLOSIS: ICD-10-CM

## 2023-10-06 NOTE — PROGRESS NOTES
Chronic Pain/PM&R Clinic Note     Encounter Date: 10/6/23    Subjective:   Chief Complaint:   Chief Complaint   Patient presents with    Botox Injection     200 units migraine        History of Present Illness:   Duglas Freeman is a 80 y.o. female seen in the clinic initially on 05/02/22 upon request from Nicolás Awan MD (PCP) for her history of chronic neck pain and migraines. She has a medical history of 2 previous cervical neck surgeries including a C3/C4 and C7/T1 anterior cervical disc fusions. She has been dealing with migraines ever since she was a teenager. She states that she has been noticing worsening pain on the right side of her neck over the last few months without any inciting event. She states this pain is a constant 5/10 shooting, throbbing, stabbing pain. She states the pain will start at the base of her occiput on the right-hand side and radiate around to the top of her scalp around to her right eye and to her ear. She states that this can be worse with mastication but denies any associated blurry vision, double vision, ringing in the ears, or issues with photophobia or phonophobia. She states that she does have some very intermittent numbness/tingling in her left hand. She denies any worsening balance/gait disturbances. She states that she has been managing her pain with the use of Excedrin, Maxalt, Topamax, baclofen. She states she was close to having Botox done for her migraines but this was never pursued when she had to have her second surgery. Today, 10/6/2023, patient presents for planned follow-up for management of chronic neck pain and migraine headaches. She states that her last round of Botox was effective in providing greater than 75% relief in her migraine headaches. She denies any changes in medications used to treat her migraines. She denies any change in the presentation of her migraine headaches.   She would like to repeat her Botox at this office visit

## 2023-10-06 NOTE — PROGRESS NOTES
Pre-operative Diagnosis: Chronic Migraine Headaches     Post-operative Diagnosis: Chronic Migraine Headaches     Procedure: Botox for migraine headaches     Procedure Description:  Patient was reclined on the examination table. Botox was drawn up into a tuberculin syringes, to a concentration of 5 units per 0.1 mL with a 30-gauge needle. Skin was prepped with alcohol wipes. The following muscles were injected after negative aspiration; The frontalis muscle bilaterally a total of 4 sites (20 units), The  muscle bilaterally a total of 2 sites (10 units), the procerus one site (5 units), the occipitalis bilaterally a total of 6 sites (30 units), The temporalis muscle bilaterally a total of 8 sites (40 units), the massetter muscles bilaterally (20 units), the trapezius bilaterally a total of 6 sites (30 units), and cervical paraspinal muscle groups a total of (45 units). This was a total of 200 units. The patient tolerated the procedure well.     Medications: 4 mL in 200 U Botox drawn up in 4 separate 1 mL TB syringes = 5 U per 0.1 mL syringe    Amount medication wasted: 0 units        Procedural Complications: None        Frankie Garber DO  Interventional Pain Management/PM&R   [address]

## 2023-10-09 DIAGNOSIS — I10 ESSENTIAL HYPERTENSION: ICD-10-CM

## 2023-10-09 DIAGNOSIS — E78.5 DYSLIPIDEMIA (HIGH LDL; LOW HDL): ICD-10-CM

## 2023-10-09 DIAGNOSIS — E88.810 METABOLIC SYNDROME: ICD-10-CM

## 2023-10-09 DIAGNOSIS — E78.5 HYPERLIPIDEMIA, UNSPECIFIED HYPERLIPIDEMIA TYPE: ICD-10-CM

## 2023-10-10 DIAGNOSIS — K21.9 GASTROESOPHAGEAL REFLUX DISEASE WITHOUT ESOPHAGITIS: ICD-10-CM

## 2023-10-10 DIAGNOSIS — E88.810 METABOLIC SYNDROME: ICD-10-CM

## 2023-10-10 DIAGNOSIS — I10 ESSENTIAL HYPERTENSION: Primary | ICD-10-CM

## 2023-10-11 RX ORDER — ROSUVASTATIN CALCIUM 40 MG/1
40 TABLET, COATED ORAL
Qty: 90 TABLET | Refills: 0 | Status: SHIPPED | OUTPATIENT
Start: 2023-10-11

## 2023-10-11 RX ORDER — ATENOLOL 50 MG/1
TABLET ORAL
Qty: 135 TABLET | Refills: 0 | Status: SHIPPED | OUTPATIENT
Start: 2023-10-11

## 2023-10-13 NOTE — TELEPHONE ENCOUNTER
Patient's last appointment was : 4/19/2023 with our   Patient's next appointment is : Visit date not found   Last refilled on: 7/13/2023  Which pharmacy does the script need sent to: [facility]      Lab Results   Component Value Date    LABA1C 5.8 04/21/2023     Lab Results   Component Value Date    CHOL 144 04/21/2023    TRIG 177 04/21/2023    HDL 51 04/21/2023    LDLCALC 58 04/21/2023     Lab Results   Component Value Date     04/21/2023    K 4.2 04/21/2023     04/21/2023    CO2 22 (L) 04/21/2023    BUN 12 04/21/2023    CREATININE 1.0 04/21/2023    GLUCOSE 92 04/21/2023    CALCIUM 9.4 04/21/2023    PROT 6.7 04/21/2023    LABALBU 4.3 04/21/2023    BILITOT 0.2 (L) 04/21/2023    ALKPHOS 81 04/21/2023    AST 16 04/21/2023    ALT 13 04/21/2023    LABGLOM >60 04/21/2023     Lab Results   Component Value Date    TSH 1.060 04/21/2023    T4FREE 1.10 04/21/2023     Lab Results   Component Value Date    WBC 9.0 04/21/2023    HGB 13.7 04/21/2023    HCT 45.4 04/21/2023    MCV 93.8 04/21/2023     04/21/2023

## 2023-10-13 NOTE — PROGRESS NOTES
[facility]    Patient: Celso Zhang, 80 y.o.   : 1955  10/17/2023    Patient of Dr. Sandra Aguilar   Patient presents with    Follow-up     1 yr copd/asthma f/u no test        HPI  Stefany Guerrero is here for follow up for asthma-COPD overlap. Overall patient reports respiratory symptoms have been stable since last appointment. Patient reports good compliance with inhaled medications (Symbicort). Patient using albuterol  as needed. Patient reports physical limitation due to respiratory symptoms. Her past medical history is significant for Asthma, neck pain, insulin resistance, tachycardia, depression, fibromyalgia, narcolepsy (follows [facility]), and hyperlipidemia. Reports sinus symptoms started 3 days ago. She reports maxillary sinus pain, rhinorrhea with yellow mucus, and nasal congestion     Complaints: Shortness of Breath  Onset Duration: Over a year  Exacerbating factors: Exertion  Alleviating factors: Rest  Associated symptoms: Cough - dry, Wheezing\"once in awhile\", and Chest Tightness \"once in awhile\"  Pertinent negatives: Sputum Production and Hemoptysis  Risk factors for lung disease: animal exposure    Progress History:   Since last visit any new medical issues? Yes issues with migraines - seeing [facility] neurology  New ER or hospital visits for breathing/pulm reasons? No  Using inhalers? Yes Symbicort and as needed Xopenex inhaler  Are they helpful? Yes   Previous inhalers? Albuterol - fast heart rate, Dulera  - expensive   Any recent exacerbations? no  Last A1AT: MM  Last PFT: 10/10/22 - mild obstruction  Last 6 MWT: None in Epic     Smoking History:  Never smoker  Admits to passive tobacco exposure from parents or work environment. She reports that one of her parents smoked.      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

## 2023-10-15 RX ORDER — METFORMIN HYDROCHLORIDE 500 MG/1
TABLET, EXTENDED RELEASE ORAL
Qty: 360 TABLET | Refills: 1 | Status: SHIPPED | OUTPATIENT
Start: 2023-10-15

## 2023-10-15 RX ORDER — LOSARTAN POTASSIUM 25 MG/1
TABLET ORAL
Qty: 90 TABLET | Refills: 1 | Status: SHIPPED | OUTPATIENT
Start: 2023-10-15

## 2023-10-15 RX ORDER — OMEPRAZOLE 40 MG/1
CAPSULE, DELAYED RELEASE ORAL
Qty: 90 CAPSULE | Refills: 1 | Status: SHIPPED | OUTPATIENT
Start: 2023-10-15

## 2023-10-17 ENCOUNTER — OFFICE VISIT (OUTPATIENT)
Dept: PULMONOLOGY | Age: 68
End: 2023-10-17

## 2023-10-17 VITALS
BODY MASS INDEX: 24.37 KG/M2 | HEART RATE: 80 BPM | SYSTOLIC BLOOD PRESSURE: 108 MMHG | TEMPERATURE: 96.9 F | DIASTOLIC BLOOD PRESSURE: 78 MMHG | WEIGHT: 160.8 LBS | HEIGHT: 68 IN | OXYGEN SATURATION: 100 %

## 2023-10-17 DIAGNOSIS — J44.9 ASTHMA-COPD OVERLAP SYNDROME: Primary | ICD-10-CM

## 2023-10-17 DIAGNOSIS — R94.2 DECREASED DIFFUSION CAPACITY OF LUNG: ICD-10-CM

## 2023-10-17 DIAGNOSIS — J01.00 ACUTE NON-RECURRENT MAXILLARY SINUSITIS: ICD-10-CM

## 2023-10-17 RX ORDER — DOXYCYCLINE HYCLATE 100 MG/1
100 CAPSULE ORAL 2 TIMES DAILY
Qty: 14 CAPSULE | Refills: 0 | Status: SHIPPED | OUTPATIENT
Start: 2023-10-17 | End: 2023-10-24

## 2023-10-17 ASSESSMENT — ENCOUNTER SYMPTOMS
CHEST TIGHTNESS: 1
COUGH: 1
SINUS PRESSURE: 1
RHINORRHEA: 1
WHEEZING: 1
SINUS PAIN: 0
SHORTNESS OF BREATH: 1

## 2023-10-27 DIAGNOSIS — R51.9 RIGHT-SIDED HEADACHE: ICD-10-CM

## 2023-10-30 RX ORDER — TOPIRAMATE 100 MG/1
100 TABLET, FILM COATED ORAL 2 TIMES DAILY
Qty: 60 TABLET | Refills: 0 | Status: SHIPPED | OUTPATIENT
Start: 2023-10-30

## 2023-10-30 NOTE — TELEPHONE ENCOUNTER
Patient's last appointment was : 4/19/2023 with our Eliecer Martínez  Patient's next appointment is : 11/2/2023 with our Dr. Estuardo Pettit  Last refilled on: 04/19/2023  Which pharmacy does the script need sent to:  [facility]      Lab Results   Component Value Date    LABA1C 5.8 04/21/2023     Lab Results   Component Value Date    CHOL 144 04/21/2023    TRIG 177 04/21/2023    HDL 51 04/21/2023    LDLCALC 58 04/21/2023     Lab Results   Component Value Date     04/21/2023    K 4.2 04/21/2023     04/21/2023    CO2 22 (L) 04/21/2023    BUN 12 04/21/2023    CREATININE 1.0 04/21/2023    GLUCOSE 92 04/21/2023    CALCIUM 9.4 04/21/2023    PROT 6.7 04/21/2023    LABALBU 4.3 04/21/2023    BILITOT 0.2 (L) 04/21/2023    ALKPHOS 81 04/21/2023    AST 16 04/21/2023    ALT 13 04/21/2023    LABGLOM >60 04/21/2023     Lab Results   Component Value Date    TSH 1.060 04/21/2023    T4FREE 1.10 04/21/2023     Lab Results   Component Value Date    WBC 9.0 04/21/2023    HGB 13.7 04/21/2023    HCT 45.4 04/21/2023    MCV 93.8 04/21/2023     04/21/2023

## 2023-11-02 ENCOUNTER — OFFICE VISIT (OUTPATIENT)
Dept: FAMILY MEDICINE CLINIC | Age: 68
End: 2023-11-02

## 2023-11-02 VITALS
SYSTOLIC BLOOD PRESSURE: 114 MMHG | RESPIRATION RATE: 18 BRPM | TEMPERATURE: 98.2 F | HEIGHT: 68 IN | WEIGHT: 159.8 LBS | DIASTOLIC BLOOD PRESSURE: 68 MMHG | OXYGEN SATURATION: 98 % | HEART RATE: 71 BPM | BODY MASS INDEX: 24.22 KG/M2

## 2023-11-02 DIAGNOSIS — I25.10 CORONARY ARTERY DISEASE INVOLVING NATIVE CORONARY ARTERY OF NATIVE HEART WITHOUT ANGINA PECTORIS: ICD-10-CM

## 2023-11-02 DIAGNOSIS — R51.9 RIGHT-SIDED HEADACHE: ICD-10-CM

## 2023-11-02 DIAGNOSIS — I10 ESSENTIAL HYPERTENSION: ICD-10-CM

## 2023-11-02 DIAGNOSIS — E55.9 VITAMIN D DEFICIENCY: ICD-10-CM

## 2023-11-02 DIAGNOSIS — F33.41 RECURRENT MAJOR DEPRESSIVE DISORDER, IN PARTIAL REMISSION (HCC): ICD-10-CM

## 2023-11-02 DIAGNOSIS — Z00.00 INITIAL MEDICARE ANNUAL WELLNESS VISIT: Primary | ICD-10-CM

## 2023-11-02 DIAGNOSIS — H91.90 HEARING LOSS, UNSPECIFIED HEARING LOSS TYPE, UNSPECIFIED LATERALITY: ICD-10-CM

## 2023-11-02 DIAGNOSIS — Z78.0 POST-MENOPAUSAL: ICD-10-CM

## 2023-11-02 DIAGNOSIS — E78.2 MIXED HYPERLIPIDEMIA: ICD-10-CM

## 2023-11-02 DIAGNOSIS — Z01.10 EVALUATION OF HEARING IMPAIRMENT: ICD-10-CM

## 2023-11-02 DIAGNOSIS — R73.03 PREDIABETES: ICD-10-CM

## 2023-11-02 PROBLEM — E11.9 TYPE 2 DIABETES MELLITUS (HCC): Status: RESOLVED | Noted: 2023-04-19 | Resolved: 2023-11-02

## 2023-11-02 ASSESSMENT — PATIENT HEALTH QUESTIONNAIRE - PHQ9
SUM OF ALL RESPONSES TO PHQ QUESTIONS 1-9: 5
1. LITTLE INTEREST OR PLEASURE IN DOING THINGS: 0
6. FEELING BAD ABOUT YOURSELF - OR THAT YOU ARE A FAILURE OR HAVE LET YOURSELF OR YOUR FAMILY DOWN: 0
SUM OF ALL RESPONSES TO PHQ9 QUESTIONS 1 & 2: 1
3. TROUBLE FALLING OR STAYING ASLEEP: 2
2. FEELING DOWN, DEPRESSED OR HOPELESS: 1
4. FEELING TIRED OR HAVING LITTLE ENERGY: 2
5. POOR APPETITE OR OVEREATING: 0
7. TROUBLE CONCENTRATING ON THINGS, SUCH AS READING THE NEWSPAPER OR WATCHING TELEVISION: 0
9. THOUGHTS THAT YOU WOULD BE BETTER OFF DEAD, OR OF HURTING YOURSELF: 0
8. MOVING OR SPEAKING SO SLOWLY THAT OTHER PEOPLE COULD HAVE NOTICED. OR THE OPPOSITE, BEING SO FIGETY OR RESTLESS THAT YOU HAVE BEEN MOVING AROUND A LOT MORE THAN USUAL: 0
SUM OF ALL RESPONSES TO PHQ QUESTIONS 1-9: 5
10. IF YOU CHECKED OFF ANY PROBLEMS, HOW DIFFICULT HAVE THESE PROBLEMS MADE IT FOR YOU TO DO YOUR WORK, TAKE CARE OF THINGS AT HOME, OR GET ALONG WITH OTHER PEOPLE: 1
SUM OF ALL RESPONSES TO PHQ QUESTIONS 1-9: 5
SUM OF ALL RESPONSES TO PHQ QUESTIONS 1-9: 5

## 2023-11-02 ASSESSMENT — LIFESTYLE VARIABLES
HOW OFTEN DO YOU HAVE A DRINK CONTAINING ALCOHOL: NEVER
HOW MANY STANDARD DRINKS CONTAINING ALCOHOL DO YOU HAVE ON A TYPICAL DAY: PATIENT DOES NOT DRINK

## 2023-11-02 NOTE — PATIENT INSTRUCTIONS
ask your healthcare professional. [address] any warranty or liability for your use of this information. For more information on your local Area Agency on Aging or [place] on Aging please visit the appropriate web site below:    [place]: MobileCycles.pl    [place]: https://aging. [place].gov/    [place]: https://aging.[place].gov/    [place]: InsuranceSquad.es           Hearing Loss: Care Instructions  Overview     Hearing loss is a sudden or slow decrease in how well you hear. It can range from slight to profound. Permanent hearing loss can occur with aging. It also can happen when you are exposed long-term to loud noise. Examples include listening to loud music, riding motorcycles, or being around other loud machines. Hearing loss can affect your work and home life. It can make you feel lonely or depressed. You may feel that you have lost your independence. But hearing aids and other devices can help you hear better and feel connected to others. Follow-up care is a key part of your treatment and safety. Be sure to make and go to all appointments, and call your doctor if you are having problems. It's also a good idea to know your test results and keep a list of the medicines you take. How can you care for yourself at home? Avoid loud noises whenever possible. This helps keep your hearing from getting worse. Always wear hearing protection around loud noises. Wear a hearing aid as directed. A professional can help you pick a hearing aid that will work best for you. You can also get hearing aids over the counter for mild to moderate hearing loss. Have hearing tests as your doctor suggests. They can show whether your hearing has changed. Your hearing aid may need to be adjusted. Use other devices as needed. These may include:  Telephone amplifiers and hearing aids that can connect to a television, stereo, radio, or microphone.   Devices that

## 2023-11-06 ENCOUNTER — OFFICE VISIT (OUTPATIENT)
Dept: CARDIOLOGY CLINIC | Age: 68
End: 2023-11-06
Payer: MEDICARE

## 2023-11-06 VITALS
DIASTOLIC BLOOD PRESSURE: 76 MMHG | WEIGHT: 161 LBS | SYSTOLIC BLOOD PRESSURE: 121 MMHG | RESPIRATION RATE: 18 BRPM | BODY MASS INDEX: 24.4 KG/M2 | HEART RATE: 75 BPM | HEIGHT: 68 IN

## 2023-11-06 DIAGNOSIS — I10 ESSENTIAL HYPERTENSION: ICD-10-CM

## 2023-11-06 DIAGNOSIS — E78.5 HYPERLIPIDEMIA LDL GOAL <70: ICD-10-CM

## 2023-11-06 DIAGNOSIS — I25.10 CORONARY ARTERY DISEASE INVOLVING NATIVE CORONARY ARTERY OF NATIVE HEART WITHOUT ANGINA PECTORIS: Primary | ICD-10-CM

## 2023-11-06 DIAGNOSIS — R07.89 OTHER CHEST PAIN: ICD-10-CM

## 2023-11-06 PROBLEM — E11.9 TYPE 2 DIABETES MELLITUS (HCC): Status: ACTIVE | Noted: 2023-11-06

## 2023-11-06 PROCEDURE — 1036F TOBACCO NON-USER: CPT | Performed by: INTERNAL MEDICINE

## 2023-11-06 PROCEDURE — 1123F ACP DISCUSS/DSCN MKR DOCD: CPT | Performed by: INTERNAL MEDICINE

## 2023-11-06 PROCEDURE — 3074F SYST BP LT 130 MM HG: CPT | Performed by: INTERNAL MEDICINE

## 2023-11-06 PROCEDURE — 93000 ELECTROCARDIOGRAM COMPLETE: CPT | Performed by: INTERNAL MEDICINE

## 2023-11-06 PROCEDURE — 3017F COLORECTAL CA SCREEN DOC REV: CPT | Performed by: INTERNAL MEDICINE

## 2023-11-06 PROCEDURE — G8427 DOCREV CUR MEDS BY ELIG CLIN: HCPCS | Performed by: INTERNAL MEDICINE

## 2023-11-06 PROCEDURE — G8399 PT W/DXA RESULTS DOCUMENT: HCPCS | Performed by: INTERNAL MEDICINE

## 2023-11-06 PROCEDURE — G8484 FLU IMMUNIZE NO ADMIN: HCPCS | Performed by: INTERNAL MEDICINE

## 2023-11-06 PROCEDURE — 1090F PRES/ABSN URINE INCON ASSESS: CPT | Performed by: INTERNAL MEDICINE

## 2023-11-06 PROCEDURE — 99214 OFFICE O/P EST MOD 30 MIN: CPT | Performed by: INTERNAL MEDICINE

## 2023-11-06 PROCEDURE — 3078F DIAST BP <80 MM HG: CPT | Performed by: INTERNAL MEDICINE

## 2023-11-06 PROCEDURE — G8420 CALC BMI NORM PARAMETERS: HCPCS | Performed by: INTERNAL MEDICINE

## 2023-11-06 RX ORDER — ATENOLOL 50 MG/1
TABLET ORAL
Qty: 135 TABLET | Refills: 4 | Status: SHIPPED | OUTPATIENT
Start: 2023-11-06

## 2023-11-06 RX ORDER — ROSUVASTATIN CALCIUM 40 MG/1
40 TABLET, COATED ORAL EVERY EVENING
Qty: 90 TABLET | Refills: 4 | Status: SHIPPED | OUTPATIENT
Start: 2023-11-06

## 2023-11-06 RX ORDER — NITROGLYCERIN 0.4 MG/1
0.4 TABLET SUBLINGUAL EVERY 5 MIN PRN
Qty: 25 TABLET | Refills: 3 | Status: SHIPPED | OUTPATIENT
Start: 2023-11-06

## 2023-11-06 ASSESSMENT — ENCOUNTER SYMPTOMS
GASTROINTESTINAL NEGATIVE: 1
CHEST TIGHTNESS: 1

## 2023-11-06 NOTE — PROGRESS NOTES
4.    Patent circ with tortuous artery. 5.   At least moderate narrowing of the mid LAD at the origin of the             diagonal artery. Distally the LAD is tortuous and patent. 6.    Diagonal artery does exhibit about 60 percent narrowing at the  ostium. 7.   Primary stenting of proximal RCA utilizing non-drug eluting stent             of 4.5x18 mm stent. HTN stable   HLP LDL 58    Hx COPD         Will do stress testing for above co     Follow 1 yr / PRN     Orders Placed This Encounter   Procedures    Juan José - Violette Leonardo current treatment plan    There are no Patient Instructions on file for this visit. Return in about 1 year (around 11/6/2024), or if symptoms worsen or fail to improve.     Follow up with Dr Addison Jimenez as scheduled or sooner if needed    Mayco Willis, JENNIFER - CNP

## 2023-11-15 NOTE — PROGRESS NOTES
[facility]                                         [id number]  2023   Chief Complaint   Patient presents with    Follow-up     2 month narcolepsy med check        Pt of Dr. Yoan Salcedo 16  SAQLI 59    Presentation:   Violeta Mercado presents for sleep medicine follow up for narcolepsy  Since the last visit Mandeep Miller been taking Nuvigil with some benefit. She does not take it every day. She is taking ritalin sometimes. ESS today is 16 but she states that is without medications. She gets about a 50% improvement with medications. Progress History:   Since last visit any new medical issues? No  New ER or hospitlal visits? No  Any new or changes in medicines? No  Any new sleep medicines? No    Sleep issues:  Do you feel better? Yes and No  More rested? Sometimes   Better concentration?  yes      Past Medical History:   Diagnosis Date    Asthma     Dr. Ricki Mota    Depression       from [facility] neck    Fibromyalgia     Hyperlipemia     check per Dr Sudhir Curtis     Insulin resistance     Narcolepsy     per Dr. Sudhir Curtis via sleep study    Neck pain     cervical stenosis    PLMD (periodic limb movement disorder)     Prolonged emergence from general anesthesia     Tachycardia        Past Surgical History:   Procedure Laterality Date    ANKLE SURGERY Left 2017    [facility]    ANKLE SURGERY Left 2020    Replacement and tendon repair    CARPAL TUNNEL RELEASE Right     CERVICAL DISCECTOMY  2012    C3-4, C4-5    CERVICAL FUSION N/A 2019    REMOVAL OF PLATE W4-3, ACDF F6-5, C6-7 WITH PLATING/MEDTRONIC performed by Jose Maria Velasquez MD at [facility]  2/12/15    Dr. Omar Uribe Right 2022    CERVICAL FACET JOINT medial branch blocks  RIGHT Cervical 2- 3  3-4 performed by Zackary Rodas DO at [address]

## 2023-11-20 ENCOUNTER — OFFICE VISIT (OUTPATIENT)
Dept: PULMONOLOGY | Age: 68
End: 2023-11-20
Payer: MEDICARE

## 2023-11-20 VITALS
DIASTOLIC BLOOD PRESSURE: 76 MMHG | OXYGEN SATURATION: 97 % | BODY MASS INDEX: 24.25 KG/M2 | WEIGHT: 160 LBS | HEART RATE: 63 BPM | TEMPERATURE: 98.5 F | SYSTOLIC BLOOD PRESSURE: 130 MMHG | HEIGHT: 68 IN

## 2023-11-20 DIAGNOSIS — G47.419 PRIMARY NARCOLEPSY WITHOUT CATAPLEXY: Primary | ICD-10-CM

## 2023-11-20 DIAGNOSIS — G47.10 HYPERSOMNIA: ICD-10-CM

## 2023-11-20 PROCEDURE — 1123F ACP DISCUSS/DSCN MKR DOCD: CPT | Performed by: PHYSICIAN ASSISTANT

## 2023-11-20 PROCEDURE — 3017F COLORECTAL CA SCREEN DOC REV: CPT | Performed by: PHYSICIAN ASSISTANT

## 2023-11-20 PROCEDURE — G8484 FLU IMMUNIZE NO ADMIN: HCPCS | Performed by: PHYSICIAN ASSISTANT

## 2023-11-20 PROCEDURE — 1036F TOBACCO NON-USER: CPT | Performed by: PHYSICIAN ASSISTANT

## 2023-11-20 PROCEDURE — G8420 CALC BMI NORM PARAMETERS: HCPCS | Performed by: PHYSICIAN ASSISTANT

## 2023-11-20 PROCEDURE — G8399 PT W/DXA RESULTS DOCUMENT: HCPCS | Performed by: PHYSICIAN ASSISTANT

## 2023-11-20 PROCEDURE — 1090F PRES/ABSN URINE INCON ASSESS: CPT | Performed by: PHYSICIAN ASSISTANT

## 2023-11-20 PROCEDURE — G8427 DOCREV CUR MEDS BY ELIG CLIN: HCPCS | Performed by: PHYSICIAN ASSISTANT

## 2023-11-20 PROCEDURE — 99214 OFFICE O/P EST MOD 30 MIN: CPT | Performed by: PHYSICIAN ASSISTANT

## 2023-11-20 RX ORDER — ARMODAFINIL 200 MG/1
200 TABLET ORAL DAILY
Qty: 30 TABLET | Refills: 5 | Status: SHIPPED | OUTPATIENT
Start: 2023-11-20 | End: 2024-05-18

## 2023-11-20 RX ORDER — METHYLPHENIDATE HYDROCHLORIDE 10 MG/1
10 TABLET ORAL 2 TIMES DAILY
Qty: 60 TABLET | Refills: 0 | Status: SHIPPED | OUTPATIENT
Start: 2023-11-20 | End: 2023-12-20

## 2023-11-21 ENCOUNTER — TELEPHONE (OUTPATIENT)
Dept: PULMONOLOGY | Age: 68
End: 2023-11-21

## 2023-11-21 NOTE — TELEPHONE ENCOUNTER
PA for [facility] completed on Cover my meds. Waiting insurance determination.

## 2023-11-22 ENCOUNTER — HOSPITAL ENCOUNTER (OUTPATIENT)
Age: 68
Discharge: HOME | End: 2023-11-24
Payer: MEDICARE

## 2023-11-22 ENCOUNTER — HOSPITAL ENCOUNTER (OUTPATIENT)
Dept: NUCLEAR MEDICINE | Age: 68
Discharge: HOME | End: 2023-11-22
Payer: MEDICARE

## 2023-11-22 VITALS — HEIGHT: 68 IN | BODY MASS INDEX: 24.26 KG/M2 | WEIGHT: 160.05 LBS

## 2023-11-22 DIAGNOSIS — I25.10 CORONARY ARTERY DISEASE INVOLVING NATIVE CORONARY ARTERY OF NATIVE HEART WITHOUT ANGINA PECTORIS: ICD-10-CM

## 2023-11-22 LAB
ECHO BSA: 1.86 M2
NUC STRESS EJECTION FRACTION: 74 %
STRESS BASELINE DIAS BP: 62 MMHG
STRESS BASELINE HR: 72 BPM
STRESS BASELINE SYS BP: 109 MMHG
STRESS STAGE 1 BP: NORMAL MMHG
STRESS STAGE 1 DURATION: 1 MIN:SEC
STRESS STAGE 1 HR: 83 BPM
STRESS STAGE 2 BP: NORMAL MMHG
STRESS STAGE 2 DURATION: 1 MIN:SEC
STRESS STAGE 2 HR: 98 BPM
STRESS STAGE 3 BP: NORMAL MMHG
STRESS STAGE 3 DURATION: 1 MIN:SEC
STRESS STAGE 3 HR: 97 BPM
STRESS STAGE RECOVERY 1 BP: NORMAL MMHG
STRESS STAGE RECOVERY 1 DURATION: 1 MIN:SEC
STRESS STAGE RECOVERY 1 HR: 96 BPM
STRESS STAGE RECOVERY 2 BP: NORMAL MMHG
STRESS STAGE RECOVERY 2 DURATION: 1 MIN:SEC
STRESS STAGE RECOVERY 2 HR: 96 BPM
STRESS STAGE RECOVERY 3 BP: NORMAL MMHG
STRESS STAGE RECOVERY 3 DURATION: 1 MIN:SEC
STRESS STAGE RECOVERY 3 HR: 94 BPM
STRESS STAGE RECOVERY 4 BP: NORMAL MMHG
STRESS STAGE RECOVERY 4 DURATION: 2 MIN:SEC
STRESS STAGE RECOVERY 4 HR: 95 BPM
STRESS TARGET HR: 153 BPM

## 2023-11-22 PROCEDURE — 6360000002 HC RX W HCPCS: Performed by: NUCLEAR MEDICINE

## 2023-11-22 PROCEDURE — 3430000000 HC RX DIAGNOSTIC RADIOPHARMACEUTICAL: Performed by: NURSE PRACTITIONER

## 2023-11-22 PROCEDURE — 78452 HT MUSCLE IMAGE SPECT MULT: CPT

## 2023-11-22 PROCEDURE — A9500 TC99M SESTAMIBI: HCPCS | Performed by: NURSE PRACTITIONER

## 2023-11-22 PROCEDURE — 93017 CV STRESS TEST TRACING ONLY: CPT

## 2023-11-22 RX ORDER — REGADENOSON 0.08 MG/ML
0.4 INJECTION, SOLUTION INTRAVENOUS
Status: COMPLETED | OUTPATIENT
Start: 2023-11-22 | End: 2023-11-22

## 2023-11-22 RX ORDER — TETRAKIS(2-METHOXYISOBUTYLISOCYANIDE)COPPER(I) TETRAFLUOROBORATE 1 MG/ML
31.4 INJECTION, POWDER, LYOPHILIZED, FOR SOLUTION INTRAVENOUS
Status: COMPLETED | OUTPATIENT
Start: 2023-11-22 | End: 2023-11-22

## 2023-11-22 RX ORDER — TETRAKIS(2-METHOXYISOBUTYLISOCYANIDE)COPPER(I) TETRAFLUOROBORATE 1 MG/ML
8.8 INJECTION, POWDER, LYOPHILIZED, FOR SOLUTION INTRAVENOUS
Status: COMPLETED | OUTPATIENT
Start: 2023-11-22 | End: 2023-11-22

## 2023-11-22 RX ADMIN — REGADENOSON 0.4 MG: 0.08 INJECTION, SOLUTION INTRAVENOUS at 10:50

## 2023-11-22 RX ADMIN — Medication 8.8 MILLICURIE: at 10:00

## 2023-11-22 RX ADMIN — Medication 31.4 MILLICURIE: at 10:52

## 2023-12-07 RX ORDER — VENLAFAXINE HYDROCHLORIDE 150 MG/1
CAPSULE, EXTENDED RELEASE ORAL
Qty: 180 CAPSULE | Refills: 1 | Status: SHIPPED | OUTPATIENT
Start: 2023-12-07

## 2023-12-07 NOTE — TELEPHONE ENCOUNTER
Patient's last appointment was : 11/2/2023 with our Ender Ra  Patient's next appointment is : 5/8/2024 with our Dr. Asuncion Aviles  Last refilled on: 8/31/2023  Which pharmacy does the script need sent to: [facility]      Lab Results   Component Value Date    LABA1C 5.8 04/21/2023     Lab Results   Component Value Date    CHOL 144 04/21/2023    TRIG 177 04/21/2023    HDL 51 04/21/2023    LDLCALC 58 04/21/2023     Lab Results   Component Value Date     04/21/2023    K 4.2 04/21/2023     04/21/2023    CO2 22 (L) 04/21/2023    BUN 12 04/21/2023    CREATININE 1.0 04/21/2023    GLUCOSE 92 04/21/2023    CALCIUM 9.4 04/21/2023    PROT 6.7 04/21/2023    LABALBU 4.3 04/21/2023    BILITOT 0.2 (L) 04/21/2023    ALKPHOS 81 04/21/2023    AST 16 04/21/2023    ALT 13 04/21/2023    LABGLOM >60 04/21/2023     Lab Results   Component Value Date    TSH 1.060 04/21/2023    T4FREE 1.10 04/21/2023     Lab Results   Component Value Date    WBC 9.0 04/21/2023    HGB 13.7 04/21/2023    HCT 45.4 04/21/2023    MCV 93.8 04/21/2023     04/21/2023

## 2023-12-08 DIAGNOSIS — J44.9 ASTHMA-COPD OVERLAP SYNDROME: ICD-10-CM

## 2023-12-11 RX ORDER — BUDESONIDE AND FORMOTEROL FUMARATE DIHYDRATE 160; 4.5 UG/1; UG/1
AEROSOL RESPIRATORY (INHALATION)
Qty: 11 G | Refills: 11 | Status: SHIPPED | OUTPATIENT
Start: 2023-12-11

## 2023-12-22 DIAGNOSIS — R51.9 RIGHT-SIDED HEADACHE: ICD-10-CM

## 2023-12-27 ENCOUNTER — HOSPITAL ENCOUNTER (OUTPATIENT)
Dept: WOMENS IMAGING | Age: 68
Discharge: HOME | End: 2023-12-27
Payer: MEDICARE

## 2023-12-27 DIAGNOSIS — Z78.0 POST-MENOPAUSAL: ICD-10-CM

## 2023-12-27 PROCEDURE — 77080 DXA BONE DENSITY AXIAL: CPT

## 2023-12-27 RX ORDER — TOPIRAMATE 100 MG/1
100 TABLET, FILM COATED ORAL 2 TIMES DAILY
Qty: 60 TABLET | Refills: 0 | Status: SHIPPED | OUTPATIENT
Start: 2023-12-27

## 2023-12-27 NOTE — TELEPHONE ENCOUNTER
Patient's last appointment was : 11/2/2023 with our Danielle Flood  Patient's next appointment is : 5/8/2024 with our Dr. Gautam Mccormick  Last refilled on: 10/31/2023  Which pharmacy does the script need sent to:  [facility]      Lab Results   Component Value Date    LABA1C 5.8 04/21/2023     Lab Results   Component Value Date    CHOL 144 04/21/2023    TRIG 177 04/21/2023    HDL 51 04/21/2023    LDLCALC 58 04/21/2023     Lab Results   Component Value Date     04/21/2023    K 4.2 04/21/2023     04/21/2023    CO2 22 (L) 04/21/2023    BUN 12 04/21/2023    CREATININE 1.0 04/21/2023    GLUCOSE 92 04/21/2023    CALCIUM 9.4 04/21/2023    PROT 6.7 04/21/2023    LABALBU 4.3 04/21/2023    BILITOT 0.2 (L) 04/21/2023    ALKPHOS 81 04/21/2023    AST 16 04/21/2023    ALT 13 04/21/2023    LABGLOM >60 04/21/2023     Lab Results   Component Value Date    TSH 1.060 04/21/2023    T4FREE 1.10 04/21/2023     Lab Results   Component Value Date    WBC 9.0 04/21/2023    HGB 13.7 04/21/2023    HCT 45.4 04/21/2023    MCV 93.8 04/21/2023     04/21/2023

## 2023-12-28 ENCOUNTER — TELEPHONE (OUTPATIENT)
Dept: FAMILY MEDICINE CLINIC | Age: 68
End: 2023-12-28

## 2023-12-29 ENCOUNTER — OFFICE VISIT (OUTPATIENT)
Dept: PHYSICAL MEDICINE AND REHAB | Age: 68
End: 2023-12-29

## 2023-12-29 VITALS
HEIGHT: 68 IN | BODY MASS INDEX: 24.25 KG/M2 | WEIGHT: 160 LBS | SYSTOLIC BLOOD PRESSURE: 134 MMHG | DIASTOLIC BLOOD PRESSURE: 86 MMHG

## 2023-12-29 DIAGNOSIS — G43.719 INTRACTABLE CHRONIC MIGRAINE WITHOUT AURA AND WITHOUT STATUS MIGRAINOSUS: Primary | ICD-10-CM

## 2023-12-29 DIAGNOSIS — G89.29 OTHER CHRONIC PAIN: ICD-10-CM

## 2023-12-29 DIAGNOSIS — M79.18 MYOFASCIAL PAIN: ICD-10-CM

## 2024-02-08 DIAGNOSIS — R51.9 RIGHT-SIDED HEADACHE: ICD-10-CM

## 2024-02-08 RX ORDER — TOPIRAMATE 100 MG/1
100 TABLET, FILM COATED ORAL 2 TIMES DAILY
Qty: 60 TABLET | Refills: 0 | Status: SHIPPED | OUTPATIENT
Start: 2024-02-08

## 2024-02-08 NOTE — TELEPHONE ENCOUNTER
[facility] Call Center    Phone Message    May a detailed message be left on voicemail: yes, Pt is wanting to get a call back in regards to when medication has been sent to the pharmacy.     Reason for Call: Medication Refill Request    Has the patient contacted the pharmacy for the refill? Yes   Name of medication being requested:     * losartan (COZAAR) 50 MG tablet,  * traZODone (DESYREL) 100 MG tablet    Provider who prescribed the medication: LIVAN VEGA  Pharmacy: [id number] - [facility] [address] [place] [address]  Date medication is needed: 8/26/2019, ASAP, Out of medication      Action Taken: Message routed to:  [facility] Kali   Patient's last appointment was : 11/2/2023 with our   Patient's next appointment is : 5/8/2024 with our Dr. De Luna  Last refilled on: 12/27/2023  Which pharmacy does the script need sent to: [facility] [address]      Lab Results   Component Value Date    LABA1C 5.8 04/21/2023     Lab Results   Component Value Date    CHOL 144 04/21/2023    TRIG 177 04/21/2023    HDL 51 04/21/2023    LDLCALC 58 04/21/2023     Lab Results   Component Value Date     04/21/2023    K 4.2 04/21/2023     04/21/2023    CO2 22 (L) 04/21/2023    BUN 12 04/21/2023    CREATININE 1.0 04/21/2023    GLUCOSE 92 04/21/2023    CALCIUM 9.4 04/21/2023    PROT 6.7 04/21/2023    LABALBU 4.3 04/21/2023    BILITOT 0.2 (L) 04/21/2023    ALKPHOS 81 04/21/2023    AST 16 04/21/2023    ALT 13 04/21/2023    LABGLOM >60 04/21/2023     Lab Results   Component Value Date    TSH 1.060 04/21/2023    T4FREE 1.10 04/21/2023     Lab Results   Component Value Date    WBC 9.0 04/21/2023    HGB 13.7 04/21/2023    HCT 45.4 04/21/2023    MCV 93.8 04/21/2023     04/21/2023

## 2024-02-16 NOTE — PROGRESS NOTES
[facility]      Tisha Fitch                                         [id number]  2024   Chief Complaint   Patient presents with    Follow-up     3mo Narcolepsy w/o Cataplexy f/u for med check.  Doing well.        Pt of Dr. Sanchez    ESS:  16  SAQLI:  71    Presentation:   Tisha presents for sleep medicine follow up for narcolepsy  Since the last visit Tisha has been doing reasonably well with Nuvigil.  She does not take it everyday. She rarely takes Ritalin.  She could not afford [employer].  She is not active and not participating in activities. She feels depressed at times.     Progress History:   Since last visit any new medical issues? No  New ER or hospitlal visits? No  Any new or changes in medicines? No  Any new sleep medicines? No    Sleep issues:  Do you feel better? No  More rested? No   Better concentration? no      Past Medical History:   Diagnosis Date    Asthma 2013    Dr. Toribio    Depression 2000      from [facility] neck    Fibromyalgia     Hyperlipemia     check per Dr Lynn     Insulin resistance     Narcolepsy     per Dr. Lynn via sleep study    Neck pain     cervical stenosis    PLMD (periodic limb movement disorder)     Prolonged emergence from general anesthesia     Tachycardia        Past Surgical History:   Procedure Laterality Date    ANKLE SURGERY Left 2017    [facility]    ANKLE SURGERY Left 2020    Replacement and tendon repair    CARPAL TUNNEL RELEASE Right     CERVICAL DISCECTOMY  2012    C3-4, C4-5    CERVICAL FUSION N/A 2019    REMOVAL OF PLATE C4-5, ACDF C3-4, C6-7 WITH PLATING/MEDTRONIC performed by Bebeto Morrow MD at [facility] OR    COLONOSCOPY      CORONARY ANGIOPLASTY WITH STENT PLACEMENT  2/12/15    Dr. Lynn    FACET JOINT INJECTION Right 2022    CERVICAL FACET JOINT medial branch blocks  RIGHT Cervical 2- 3  3-4 performed by Frankie Garber DO at [facility]

## 2024-02-19 ENCOUNTER — OFFICE VISIT (OUTPATIENT)
Dept: PULMONOLOGY | Age: 69
End: 2024-02-19
Payer: MEDICARE

## 2024-02-19 VITALS
HEART RATE: 96 BPM | SYSTOLIC BLOOD PRESSURE: 130 MMHG | BODY MASS INDEX: 24.55 KG/M2 | WEIGHT: 162 LBS | TEMPERATURE: 97.5 F | OXYGEN SATURATION: 100 % | HEIGHT: 68 IN | DIASTOLIC BLOOD PRESSURE: 80 MMHG

## 2024-02-19 DIAGNOSIS — G47.419 PRIMARY NARCOLEPSY WITHOUT CATAPLEXY: Primary | ICD-10-CM

## 2024-02-19 DIAGNOSIS — G47.10 HYPERSOMNIA: ICD-10-CM

## 2024-02-19 PROCEDURE — G8427 DOCREV CUR MEDS BY ELIG CLIN: HCPCS | Performed by: PHYSICIAN ASSISTANT

## 2024-02-19 PROCEDURE — 1036F TOBACCO NON-USER: CPT | Performed by: PHYSICIAN ASSISTANT

## 2024-02-19 PROCEDURE — 99213 OFFICE O/P EST LOW 20 MIN: CPT | Performed by: PHYSICIAN ASSISTANT

## 2024-02-19 PROCEDURE — 1123F ACP DISCUSS/DSCN MKR DOCD: CPT | Performed by: PHYSICIAN ASSISTANT

## 2024-02-19 PROCEDURE — 1090F PRES/ABSN URINE INCON ASSESS: CPT | Performed by: PHYSICIAN ASSISTANT

## 2024-02-19 PROCEDURE — 3017F COLORECTAL CA SCREEN DOC REV: CPT | Performed by: PHYSICIAN ASSISTANT

## 2024-02-19 PROCEDURE — G8399 PT W/DXA RESULTS DOCUMENT: HCPCS | Performed by: PHYSICIAN ASSISTANT

## 2024-02-19 PROCEDURE — G8420 CALC BMI NORM PARAMETERS: HCPCS | Performed by: PHYSICIAN ASSISTANT

## 2024-02-19 PROCEDURE — G8484 FLU IMMUNIZE NO ADMIN: HCPCS | Performed by: PHYSICIAN ASSISTANT

## 2024-02-19 RX ORDER — METHYLPHENIDATE HYDROCHLORIDE 10 MG/1
10 TABLET ORAL 2 TIMES DAILY
Qty: 60 TABLET | Refills: 0 | Status: SHIPPED | OUTPATIENT
Start: 2024-02-19 | End: 2024-03-20

## 2024-02-19 RX ORDER — ARMODAFINIL 200 MG/1
200 TABLET ORAL DAILY
Qty: 30 TABLET | Refills: 5 | Status: SHIPPED | OUTPATIENT
Start: 2024-02-19 | End: 2024-08-17

## 2024-02-26 ENCOUNTER — TELEPHONE (OUTPATIENT)
Dept: PULMONOLOGY | Age: 69
End: 2024-02-26

## 2024-02-26 ENCOUNTER — OFFICE VISIT (OUTPATIENT)
Dept: FAMILY MEDICINE CLINIC | Age: 69
End: 2024-02-26
Payer: MEDICARE

## 2024-02-26 VITALS
HEIGHT: 68 IN | SYSTOLIC BLOOD PRESSURE: 118 MMHG | TEMPERATURE: 98.1 F | BODY MASS INDEX: 24.49 KG/M2 | WEIGHT: 161.6 LBS | HEART RATE: 62 BPM | RESPIRATION RATE: 20 BRPM | DIASTOLIC BLOOD PRESSURE: 80 MMHG | OXYGEN SATURATION: 96 %

## 2024-02-26 DIAGNOSIS — J44.89 ASTHMA-COPD OVERLAP SYNDROME: ICD-10-CM

## 2024-02-26 DIAGNOSIS — U07.1 COVID-19: Primary | ICD-10-CM

## 2024-02-26 LAB
INFLUENZA VIRUS A RNA: NORMAL
INFLUENZA VIRUS B RNA: NORMAL
Lab: ABNORMAL
QC PASS/FAIL: ABNORMAL
SARS-COV-2 RDRP RESP QL NAA+PROBE: POSITIVE

## 2024-02-26 PROCEDURE — G8420 CALC BMI NORM PARAMETERS: HCPCS

## 2024-02-26 PROCEDURE — 87502 INFLUENZA DNA AMP PROBE: CPT

## 2024-02-26 PROCEDURE — 1123F ACP DISCUSS/DSCN MKR DOCD: CPT

## 2024-02-26 PROCEDURE — 3017F COLORECTAL CA SCREEN DOC REV: CPT

## 2024-02-26 PROCEDURE — 1090F PRES/ABSN URINE INCON ASSESS: CPT

## 2024-02-26 PROCEDURE — 87635 SARS-COV-2 COVID-19 AMP PRB: CPT

## 2024-02-26 PROCEDURE — 99213 OFFICE O/P EST LOW 20 MIN: CPT

## 2024-02-26 PROCEDURE — G8427 DOCREV CUR MEDS BY ELIG CLIN: HCPCS

## 2024-02-26 PROCEDURE — G8484 FLU IMMUNIZE NO ADMIN: HCPCS

## 2024-02-26 PROCEDURE — 1036F TOBACCO NON-USER: CPT

## 2024-02-26 PROCEDURE — G8399 PT W/DXA RESULTS DOCUMENT: HCPCS

## 2024-02-26 RX ORDER — BUDESONIDE AND FORMOTEROL FUMARATE DIHYDRATE 160; 4.5 UG/1; UG/1
2 AEROSOL RESPIRATORY (INHALATION) 2 TIMES DAILY
Qty: 3 EACH | Refills: 3 | Status: SHIPPED | OUTPATIENT
Start: 2024-02-26

## 2024-02-26 RX ORDER — BENZONATATE 200 MG/1
200 CAPSULE ORAL 3 TIMES DAILY PRN
Qty: 30 CAPSULE | Refills: 0 | Status: SHIPPED | OUTPATIENT
Start: 2024-02-26 | End: 2024-03-04

## 2024-02-26 RX ORDER — FLUTICASONE PROPIONATE 50 MCG
2 SPRAY, SUSPENSION (ML) NASAL DAILY
Qty: 16 G | Refills: 0 | Status: SHIPPED | OUTPATIENT
Start: 2024-02-26

## 2024-02-26 RX ORDER — LEVALBUTEROL TARTRATE 45 UG/1
2 AEROSOL, METERED ORAL EVERY 6 HOURS PRN
Qty: 3 EACH | Refills: 3 | Status: SHIPPED | OUTPATIENT
Start: 2024-02-26 | End: 2025-02-25

## 2024-02-26 ASSESSMENT — PATIENT HEALTH QUESTIONNAIRE - PHQ9
8. MOVING OR SPEAKING SO SLOWLY THAT OTHER PEOPLE COULD HAVE NOTICED. OR THE OPPOSITE, BEING SO FIGETY OR RESTLESS THAT YOU HAVE BEEN MOVING AROUND A LOT MORE THAN USUAL: 0
SUM OF ALL RESPONSES TO PHQ QUESTIONS 1-9: 0
SUM OF ALL RESPONSES TO PHQ QUESTIONS 1-9: 0
3. TROUBLE FALLING OR STAYING ASLEEP: 0
7. TROUBLE CONCENTRATING ON THINGS, SUCH AS READING THE NEWSPAPER OR WATCHING TELEVISION: 0
2. FEELING DOWN, DEPRESSED OR HOPELESS: 0
10. IF YOU CHECKED OFF ANY PROBLEMS, HOW DIFFICULT HAVE THESE PROBLEMS MADE IT FOR YOU TO DO YOUR WORK, TAKE CARE OF THINGS AT HOME, OR GET ALONG WITH OTHER PEOPLE: 0
9. THOUGHTS THAT YOU WOULD BE BETTER OFF DEAD, OR OF HURTING YOURSELF: 0
SUM OF ALL RESPONSES TO PHQ QUESTIONS 1-9: 0
SUM OF ALL RESPONSES TO PHQ QUESTIONS 1-9: 0
SUM OF ALL RESPONSES TO PHQ9 QUESTIONS 1 & 2: 0
6. FEELING BAD ABOUT YOURSELF - OR THAT YOU ARE A FAILURE OR HAVE LET YOURSELF OR YOUR FAMILY DOWN: 0
1. LITTLE INTEREST OR PLEASURE IN DOING THINGS: 0
4. FEELING TIRED OR HAVING LITTLE ENERGY: 0
5. POOR APPETITE OR OVEREATING: 0

## 2024-02-26 ASSESSMENT — ENCOUNTER SYMPTOMS
RHINORRHEA: 1
VOMITING: 1
SORE THROAT: 0
SINUS PRESSURE: 1
SHORTNESS OF BREATH: 0
ABDOMINAL PAIN: 0
DIARRHEA: 0
NAUSEA: 1
TROUBLE SWALLOWING: 0
CHOKING: 0
CONSTIPATION: 0
WHEEZING: 0
BACK PAIN: 0

## 2024-02-26 NOTE — PROGRESS NOTES
S: 68 y.o. female with   Chief Complaint   Patient presents with    Cough     Symptoms started Friday headache, cough, congestion. 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        4 days HA, congestion ,non productive cough  Grandchild w influenza and impetigo as sick contact  No Cp/SOB, 1 episode of vomiting    Reviewed hx and ROS in detail with resident prior to exam.  See notes for additional details.     BP Readings from Last 3 Encounters:   02/26/24 118/80   02/19/24 130/80   12/29/23 134/86     Wt Readings from Last 3 Encounters:   02/26/24 73.3 kg (161 lb 9.6 oz)   02/19/24 73.5 kg (162 lb)   12/29/23 72.6 kg (160 lb)           O: VS:  height is 1.727 m (5' 8\") and weight is 73.3 kg (161 lb 9.6 oz). Her oral temperature is 98.1 °F (36.7 °C). Her blood pressure is 118/80 and her pulse is 62. Her respiration is 20 and oxygen saturation is 85% (abnormal).        Diagnosis Orders   1. Nasal congestion  POCT COVID-19 Rapid, NAAT    POCT Influenza A/B DNA (Alere i)          Plan    Viral URI- supportive measures reviewed    Health Maintenance Due   Topic Date Due    Diabetic retinal exam  Never done    COVID-19 Vaccine (4 - 2023-24 season) 09/01/2023         Attending Physician Statement  I have discussed the case, including pertinent history and exam findings with the resident.  I agree with the documented assessment and plan as documented by the resident.  GE modifier added to this encounter      Katelyn Ann Leopold, MD 2/26/2024 11:50 AM      
soft.      Tenderness: There is no abdominal tenderness.   Musculoskeletal:         General: Normal range of motion.      Cervical back: Normal range of motion and neck supple.      Right lower leg: No edema.      Left lower leg: No edema.   Skin:     General: Skin is warm and dry.      Findings: No rash.   Neurological:      General: No focal deficit present.      Mental Status: She is alert and oriented to person, place, and time. Mental status is at baseline.              An electronic signature was used to authenticate this note.    --Génesis Turner DO

## 2024-02-26 NOTE — TELEPHONE ENCOUNTER
Patient stopped into the office to let us know her insurance will no longer cover brand name symbicort. They will however cover generic only. Can you send a script to [facility] on [address]?   She also mentioned she needs a script for Xopenex sent in as well!   Please advise.   Thanks!

## 2024-03-22 ENCOUNTER — OFFICE VISIT (OUTPATIENT)
Dept: PHYSICAL MEDICINE AND REHAB | Age: 69
End: 2024-03-22

## 2024-03-22 VITALS
DIASTOLIC BLOOD PRESSURE: 80 MMHG | BODY MASS INDEX: 24.49 KG/M2 | SYSTOLIC BLOOD PRESSURE: 128 MMHG | HEIGHT: 68 IN | WEIGHT: 161.6 LBS

## 2024-03-22 DIAGNOSIS — G89.29 OTHER CHRONIC PAIN: ICD-10-CM

## 2024-03-22 DIAGNOSIS — G43.719 INTRACTABLE CHRONIC MIGRAINE WITHOUT AURA AND WITHOUT STATUS MIGRAINOSUS: Primary | ICD-10-CM

## 2024-03-22 DIAGNOSIS — M79.18 MYOFASCIAL PAIN: ICD-10-CM

## 2024-03-22 NOTE — PROGRESS NOTES
Pre-operative Diagnosis: Chronic Migraine Headaches     Post-operative Diagnosis: Chronic Migraine Headaches     Procedure: Botox for migraine headaches     Procedure Description:  Patient was reclined on the examination table. Botox was drawn up into a tuberculin syringes, to a concentration of 5 units per 0.1 mL with a 30-gauge needle. Skin was prepped with alcohol wipes. The following muscles were injected after negative aspiration; The frontalis muscle bilaterally a total of 4 sites (20 units), The  muscle bilaterally a total of 2 sites (10 units), the procerus one site (5 units), the occipitalis bilaterally a total of 6 sites (30 units), The temporalis muscle bilaterally a total of 8 sites (40 units), the massetter muscles bilaterally (20 units), the trapezius bilaterally a total of 6 sites (30 units), and cervical paraspinal muscle groups a total of (45 units). This was a total of 200 units. The patient tolerated the procedure well.    Medications: 4 mL in 200 U Botox drawn up in 4 separate 1 mL TB syringes = 5 U per 0.1 mL syringe    Amount medication wasted: 0 units        Procedural Complications: None        Frankie Garber DO  Interventional Pain Management/PM&R   [facility] [place]     
onabotulinumtoxinA (Botox) injection for better management of chronic migraine headaches. The risks and benefits were discussed in detail with the patient. Patient underwent Botox injections in clinic today.    Anticoagulation/NPO Recommendations:   None    Multidisciplinary Pain Management:   In the presence of complex, chronic, and multi-factorial pain, the importance of a multidisciplinary approach to pain management in the patient’s management regimen was emphasized and discussed in great detail.   STRETCHING: Reviewed stretching techniques and given handout instructions on gentle neck stretching/range of motion for posterior neck muscles targeting cervical paraspinal muscles (splenius capitus, semispinalis capitis, splenius cervicis) and trapezius muscles to reduced muscle spasms and tensions that precipitate migraine headaches    Prescriptions Written This Visit:       Follow-up: 3 months    I spent 20 minutes directly involved and greater than 50% of this time was spent reviewing stretching techniques associated with muscle tension that precipitate migraine headaches, reviewing patient's migraine prophylaxis and abortive medications, and discussing alternative treatment options at this visit separate from Botox including massage therapy, physical therapy, acupuncture, and chiropractic adjustments which extended beyond the preservice time associated with the procedure in office.      Frankie Garber DO  Interventional Pain Management/PM&R   [facility] [place]

## 2024-04-01 DIAGNOSIS — R51.9 RIGHT-SIDED HEADACHE: ICD-10-CM

## 2024-04-02 RX ORDER — TOPIRAMATE 100 MG/1
100 TABLET, FILM COATED ORAL 2 TIMES DAILY
Qty: 60 TABLET | Refills: 0 | Status: SHIPPED | OUTPATIENT
Start: 2024-04-02

## 2024-04-02 NOTE — TELEPHONE ENCOUNTER
Patient's last appointment was : 2/26/2024 with our   Patient's next appointment is : 5/8/2024 with our Dr. De Luna  Last refilled on: 02/08/2024  Which pharmacy does the script need sent [facility]: [facility]      Lab Results   Component Value Date    LABA1C 5.8 04/21/2023     Lab Results   Component Value Date    CHOL 144 04/21/2023    TRIG 177 04/21/2023    HDL 51 04/21/2023    LDLCALC 58 04/21/2023     Lab Results   Component Value Date     04/21/2023    K 4.2 04/21/2023     04/21/2023    CO2 22 (L) 04/21/2023    BUN 12 04/21/2023    CREATININE 1.0 04/21/2023    GLUCOSE 92 04/21/2023    CALCIUM 9.4 04/21/2023    PROT 6.7 04/21/2023    LABALBU 4.3 04/21/2023    BILITOT 0.2 (L) 04/21/2023    ALKPHOS 81 04/21/2023    AST 16 04/21/2023    ALT 13 04/21/2023    LABGLOM >60 04/21/2023     Lab Results   Component Value Date    TSH 1.060 04/21/2023    T4FREE 1.10 04/21/2023     Lab Results   Component Value Date    WBC 9.0 04/21/2023    HGB 13.7 04/21/2023    HCT 45.4 04/21/2023    MCV 93.8 04/21/2023     04/21/2023

## 2024-04-03 DIAGNOSIS — E88.810 METABOLIC SYNDROME: ICD-10-CM

## 2024-04-03 DIAGNOSIS — I10 ESSENTIAL HYPERTENSION: ICD-10-CM

## 2024-04-03 DIAGNOSIS — K21.9 GASTROESOPHAGEAL REFLUX DISEASE WITHOUT ESOPHAGITIS: ICD-10-CM

## 2024-04-04 RX ORDER — LOSARTAN POTASSIUM 25 MG/1
TABLET ORAL
Qty: 30 TABLET | Refills: 0 | Status: SHIPPED | OUTPATIENT
Start: 2024-04-04

## 2024-04-04 RX ORDER — METFORMIN HYDROCHLORIDE 500 MG/1
1000 TABLET, EXTENDED RELEASE ORAL 2 TIMES DAILY
Qty: 120 TABLET | Refills: 1 | Status: SHIPPED | OUTPATIENT
Start: 2024-04-04

## 2024-04-04 RX ORDER — OMEPRAZOLE 40 MG/1
40 CAPSULE, DELAYED RELEASE ORAL DAILY
Qty: 30 CAPSULE | Refills: 0 | Status: SHIPPED | OUTPATIENT
Start: 2024-04-04

## 2024-04-04 NOTE — TELEPHONE ENCOUNTER
Patient's last appointment was : 2/26/2024 with our   Patient's next appointment is : 5/8/2024 with our Dr. De Luna  Last refilled on: 10-15-23  Which pharmacy does the script need sent to: [facility]       Lab Results   Component Value Date    LABA1C 5.8 04/21/2023     Lab Results   Component Value Date    CHOL 144 04/21/2023    TRIG 177 04/21/2023    HDL 51 04/21/2023    LDLCALC 58 04/21/2023     Lab Results   Component Value Date     04/21/2023    K 4.2 04/21/2023     04/21/2023    CO2 22 (L) 04/21/2023    BUN 12 04/21/2023    CREATININE 1.0 04/21/2023    GLUCOSE 92 04/21/2023    CALCIUM 9.4 04/21/2023    PROT 6.7 04/21/2023    LABALBU 4.3 04/21/2023    BILITOT 0.2 (L) 04/21/2023    ALKPHOS 81 04/21/2023    AST 16 04/21/2023    ALT 13 04/21/2023    LABGLOM >60 04/21/2023     Lab Results   Component Value Date    TSH 1.060 04/21/2023    T4FREE 1.10 04/21/2023     Lab Results   Component Value Date    WBC 9.0 04/21/2023    HGB 13.7 04/21/2023    HCT 45.4 04/21/2023    MCV 93.8 04/21/2023     04/21/2023

## 2024-04-11 ENCOUNTER — OFFICE VISIT (OUTPATIENT)
Dept: FAMILY MEDICINE CLINIC | Age: 69
End: 2024-04-11

## 2024-04-11 VITALS
TEMPERATURE: 98.4 F | OXYGEN SATURATION: 97 % | WEIGHT: 163.4 LBS | RESPIRATION RATE: 21 BRPM | SYSTOLIC BLOOD PRESSURE: 112 MMHG | HEART RATE: 67 BPM | DIASTOLIC BLOOD PRESSURE: 60 MMHG | HEIGHT: 68 IN | BODY MASS INDEX: 24.77 KG/M2

## 2024-04-11 DIAGNOSIS — F33.41 RECURRENT MAJOR DEPRESSIVE DISORDER, IN PARTIAL REMISSION (HCC): ICD-10-CM

## 2024-04-11 DIAGNOSIS — R51.9 RIGHT-SIDED HEADACHE: ICD-10-CM

## 2024-04-11 DIAGNOSIS — J44.9 CHRONIC OBSTRUCTIVE PULMONARY DISEASE, UNSPECIFIED COPD TYPE (HCC): ICD-10-CM

## 2024-04-11 DIAGNOSIS — K21.9 GASTROESOPHAGEAL REFLUX DISEASE WITHOUT ESOPHAGITIS: ICD-10-CM

## 2024-04-11 DIAGNOSIS — I10 ESSENTIAL HYPERTENSION: Primary | ICD-10-CM

## 2024-04-11 DIAGNOSIS — J44.89 ASTHMA-COPD OVERLAP SYNDROME (HCC): ICD-10-CM

## 2024-04-11 DIAGNOSIS — R73.03 PREDIABETES: ICD-10-CM

## 2024-04-11 DIAGNOSIS — L30.9 DERMATITIS: ICD-10-CM

## 2024-04-11 DIAGNOSIS — E88.810 METABOLIC SYNDROME: ICD-10-CM

## 2024-04-11 DIAGNOSIS — I25.10 CORONARY ARTERY DISEASE INVOLVING NATIVE CORONARY ARTERY OF NATIVE HEART WITHOUT ANGINA PECTORIS: ICD-10-CM

## 2024-04-11 PROBLEM — E11.9 TYPE 2 DIABETES MELLITUS (HCC): Status: RESOLVED | Noted: 2023-11-06 | Resolved: 2024-04-11

## 2024-04-11 RX ORDER — TOPIRAMATE 100 MG/1
100 TABLET, FILM COATED ORAL 2 TIMES DAILY
Qty: 180 TABLET | Refills: 1 | Status: SHIPPED | OUTPATIENT
Start: 2024-04-11

## 2024-04-11 RX ORDER — LOSARTAN POTASSIUM 25 MG/1
TABLET ORAL
Qty: 90 TABLET | Refills: 1 | Status: SHIPPED | OUTPATIENT
Start: 2024-04-11

## 2024-04-11 RX ORDER — METFORMIN HYDROCHLORIDE 500 MG/1
1000 TABLET, EXTENDED RELEASE ORAL 2 TIMES DAILY
Qty: 360 TABLET | Refills: 1 | Status: SHIPPED | OUTPATIENT
Start: 2024-04-11

## 2024-04-11 RX ORDER — OMEPRAZOLE 40 MG/1
40 CAPSULE, DELAYED RELEASE ORAL DAILY
Qty: 90 CAPSULE | Refills: 1 | Status: SHIPPED | OUTPATIENT
Start: 2024-04-11

## 2024-04-11 RX ORDER — VENLAFAXINE HYDROCHLORIDE 150 MG/1
CAPSULE, EXTENDED RELEASE ORAL
Qty: 180 CAPSULE | Refills: 1 | Status: SHIPPED | OUTPATIENT
Start: 2024-04-11

## 2024-04-11 RX ORDER — RIZATRIPTAN BENZOATE 10 MG/1
10 TABLET ORAL
Qty: 9 TABLET | Refills: 3 | Status: SHIPPED | OUTPATIENT
Start: 2024-04-11 | End: 2024-04-11

## 2024-04-11 RX ORDER — TRIAMCINOLONE ACETONIDE 1 MG/G
OINTMENT TOPICAL 2 TIMES DAILY
Qty: 30 G | Refills: 0 | Status: SHIPPED | OUTPATIENT
Start: 2024-04-11 | End: 2024-04-18

## 2024-04-11 ASSESSMENT — ENCOUNTER SYMPTOMS
COUGH: 0
WHEEZING: 0
CONSTIPATION: 0
ABDOMINAL PAIN: 0
BACK PAIN: 0
NAUSEA: 0
VOMITING: 0
DIARRHEA: 0
SHORTNESS OF BREATH: 0

## 2024-04-11 NOTE — PROGRESS NOTES
S: 68 y.o. female with   Chief Complaint   Patient presents with    6 Month Follow-Up     Patient complains of sores on back, itchy, scabby. First noticed 3-4 weeks ago.       Chief complaint, [place], and all pertinent details of the case reviewed with the resident.    Please see resident's note for specific details discussed at today's visit.    BP Readings from Last 3 Encounters:   04/11/24 112/60   03/22/24 128/80   02/26/24 118/80     Wt Readings from Last 3 Encounters:   04/11/24 74.1 kg (163 lb 6.4 oz)   03/22/24 73.3 kg (161 lb 9.6 oz)   02/26/24 73.3 kg (161 lb 9.6 oz)       O: VS:  height is 1.727 m (5' 7.99\") and weight is 74.1 kg (163 lb 6.4 oz). Her oral temperature is 98.4 °F (36.9 °C). Her blood pressure is 112/60 and her pulse is 67. Her respiration is 21 and oxygen saturation is 97%.   AAO/NAD, appropriate affect for mood  A: Stable     Diagnosis Orders   1. Essential hypertension  losartan (COZAAR) 25 MG tablet      2. Recurrent major depressive disorder, in partial remission (HCC)  venlafaxine (EFFEXOR XR) 150 MG extended release capsule      3. Chronic obstructive pulmonary disease, unspecified COPD type (HCC)        4. Asthma-COPD overlap syndrome (HCC)        5. Coronary artery disease involving native coronary artery of native heart without angina pectoris        6. Prediabetes  Microalbumin / Creatinine Urine Ratio      7. Metabolic syndrome  metFORMIN (GLUCOPHAGE-XR) 500 MG extended release tablet      8. Right-sided headache  topiramate (TOPAMAX) 100 MG tablet    rizatriptan (MAXALT) 10 MG tablet      9. Gastroesophageal reflux disease without esophagitis  omeprazole (PRILOSEC) 40 MG delayed release capsule      10. Dermatitis  triamcinolone (KENALOG) 0.1 % ointment          Plan:  Med refills as above as her Headaches, GERD, depression and HTN all in stable condition on these meds  Pt to get previously ordered labs, add urine for microalb/crt  F/u with her specialist  F/u here in 6

## 2024-04-11 NOTE — PROGRESS NOTES
Tisha Fitch is a 68 y.o. female who presents today for:  Chief Complaint   Patient presents with    6 Month Follow-Up     Patient complains of sores on back, itchy, scabby. First noticed 3-4 weeks ago.         HPI:   Tisha Fitch is 68 y.o. who presents today for 6 month follow-up.    Depression: mood doing ok, Taking Effexor 150 mg twice daily. Feels overall stable on this.     Prediabetes: Taking Metformin 2000 mg daily in the morning. Doing well with this.    HTN: Taking losartan, atenolol. Not monitoring BP, cuff not working to monitor it currently.  No symptoms of lightheadedness, ringing in her ears, blurry vision.    GERD: Takes Omeprazole 40 mg daily. Has been on this several years. Has worsening symptoms when she does not take this.     CAD s/p 1 stent about 5 years ago. No chest pain, follows with Cardiology - sees once a year.  Denies chest pain or shortness of breath at this time.     Headaches: Seeing Dr. Garber for Botox and [facility] for Ubrelvy but awaiting insurance. Still having headaches but following with [facility] for this.    Spots on her back for the past 3-4 weeks which are itchy and will scab over after scratching. Not using anything on this currently. No new exposures. Not spreading.    Due for labs today.      Objective:     Vitals:    04/11/24 1109   BP: 112/60   Site: Right Upper Arm   Position: Sitting   Cuff Size: Medium Adult   Pulse: 67   Resp: 21   Temp: 98.4 °F (36.9 °C)   TempSrc: Oral   SpO2: 97%   Weight: 74.1 kg (163 lb 6.4 oz)   Height: 1.727 m (5' 7.99\")       Wt Readings from Last 3 Encounters:   04/11/24 74.1 kg (163 lb 6.4 oz)   03/22/24 73.3 kg (161 lb 9.6 oz)   02/26/24 73.3 kg (161 lb 9.6 oz)       BP Readings from Last 3 Encounters:   04/11/24 112/60   03/22/24 128/80   02/26/24 118/80       Lab Results   Component Value Date    WBC 9.0 04/21/2023    HGB 13.7 04/21/2023    HCT 45.4 04/21/2023    MCV 93.8 04/21/2023     04/21/2023     Lab Results  -- DO NOT REPLY / DO NOT REPLY ALL --  -- Message is from Engagement Center Operations ([facility]) --    General Patient Message: Patient had tried to have labs drawn on 7-, but they had a difficult time and could not do a draw.  She will need new orders placed.  Please return her call once this is done.        Alternative phone number: no    Can a detailed message be left? Yes    Message Turnaround:     Is it Working Hours? Yes - Working Hours     [place]:    Please give this turnaround time to the caller:   \"This message will be sent to [state Provider's name]. The clinical team will fulfill your request as soon as they review your message.\"

## 2024-04-30 DIAGNOSIS — J45.30 MILD PERSISTENT ASTHMA WITHOUT COMPLICATION: ICD-10-CM

## 2024-04-30 RX ORDER — MONTELUKAST SODIUM 10 MG/1
10 TABLET ORAL NIGHTLY
Qty: 30 TABLET | Refills: 11 | Status: SHIPPED | OUTPATIENT
Start: 2024-04-30

## 2024-05-01 DIAGNOSIS — J45.30 MILD PERSISTENT ASTHMA WITHOUT COMPLICATION: ICD-10-CM

## 2024-05-01 RX ORDER — MONTELUKAST SODIUM 10 MG/1
10 TABLET ORAL NIGHTLY
Qty: 30 TABLET | Refills: 11 | OUTPATIENT
Start: 2024-05-01

## 2024-05-10 DIAGNOSIS — J45.30 MILD PERSISTENT ASTHMA WITHOUT COMPLICATION: ICD-10-CM

## 2024-05-10 RX ORDER — MONTELUKAST SODIUM 10 MG/1
10 TABLET ORAL NIGHTLY
Qty: 30 TABLET | Refills: 11 | OUTPATIENT
Start: 2024-05-10

## 2024-05-28 ENCOUNTER — TELEPHONE (OUTPATIENT)
Dept: FAMILY MEDICINE CLINIC | Age: 69
End: 2024-05-28

## 2024-05-28 NOTE — TELEPHONE ENCOUNTER
She has labs that need done - ordered prior to last visit.  Please let her know.  She should have another refill on her losartan - ordered 4/11/24 per Dr. Bettencourt and had 90 with 1 refill.

## 2024-05-28 NOTE — TELEPHONE ENCOUNTER
Patient's last appointment was : 4/11/2024 with our .  Patient's next appointment is : 10/16/2024 with our    Last refilled on:   Which pharmacy does the script need sent to: [facility]     Patient requests for Cozaar to be refilled     Lab Results   Component Value Date    LABA1C 5.8 04/21/2023     Lab Results   Component Value Date    CHOL 144 04/21/2023    TRIG 177 04/21/2023    HDL 51 04/21/2023     Lab Results   Component Value Date     04/21/2023    K 4.2 04/21/2023     04/21/2023    CO2 22 (L) 04/21/2023    BUN 12 04/21/2023    CREATININE 1.0 04/21/2023    GLUCOSE 92 04/21/2023    CALCIUM 9.4 04/21/2023    BILITOT 0.2 (L) 04/21/2023    ALKPHOS 81 04/21/2023    AST 16 04/21/2023    ALT 13 04/21/2023    LABGLOM >60 04/21/2023     Lab Results   Component Value Date    TSH 1.060 04/21/2023    T4FREE 1.10 04/21/2023     Lab Results   Component Value Date    WBC 9.0 04/21/2023    HGB 13.7 04/21/2023    HCT 45.4 04/21/2023    MCV 93.8 04/21/2023     04/21/2023

## 2024-05-30 DIAGNOSIS — I10 ESSENTIAL HYPERTENSION: ICD-10-CM

## 2024-05-30 NOTE — TELEPHONE ENCOUNTER
Patient's last appointment was : 4/11/2024 with our   Patient's next appointment is : 10/16/2024 with our Dr. Vera  Last refilled on: 04/11/2024  Which pharmacy does the script need sent to: ***      Lab Results   Component Value Date    LABA1C 5.8 04/21/2023     Lab Results   Component Value Date    CHOL 144 04/21/2023    TRIG 177 04/21/2023    HDL 51 04/21/2023     Lab Results   Component Value Date     04/21/2023    K 4.2 04/21/2023     04/21/2023    CO2 22 (L) 04/21/2023    BUN 12 04/21/2023    CREATININE 1.0 04/21/2023    GLUCOSE 92 04/21/2023    CALCIUM 9.4 04/21/2023    BILITOT 0.2 (L) 04/21/2023    ALKPHOS 81 04/21/2023    AST 16 04/21/2023    ALT 13 04/21/2023    LABGLOM >60 04/21/2023     Lab Results   Component Value Date    TSH 1.060 04/21/2023    T4FREE 1.10 04/21/2023     Lab Results   Component Value Date    WBC 9.0 04/21/2023    HGB 13.7 04/21/2023    HCT 45.4 04/21/2023    MCV 93.8 04/21/2023     04/21/2023

## 2024-05-31 RX ORDER — LOSARTAN POTASSIUM 25 MG/1
TABLET ORAL
Qty: 90 TABLET | Refills: 1 | OUTPATIENT
Start: 2024-05-31

## 2024-06-17 ENCOUNTER — OFFICE VISIT (OUTPATIENT)
Dept: PHYSICAL MEDICINE AND REHAB | Age: 69
End: 2024-06-17
Payer: MEDICARE

## 2024-06-17 VITALS
WEIGHT: 163 LBS | DIASTOLIC BLOOD PRESSURE: 72 MMHG | HEIGHT: 68 IN | BODY MASS INDEX: 24.71 KG/M2 | SYSTOLIC BLOOD PRESSURE: 106 MMHG

## 2024-06-17 DIAGNOSIS — M79.18 MYOFASCIAL PAIN: ICD-10-CM

## 2024-06-17 DIAGNOSIS — G43.719 INTRACTABLE CHRONIC MIGRAINE WITHOUT AURA AND WITHOUT STATUS MIGRAINOSUS: Primary | ICD-10-CM

## 2024-06-17 DIAGNOSIS — G89.29 OTHER CHRONIC PAIN: ICD-10-CM

## 2024-06-17 DIAGNOSIS — M47.812 CERVICAL SPONDYLOSIS: ICD-10-CM

## 2024-06-17 PROCEDURE — 1090F PRES/ABSN URINE INCON ASSESS: CPT | Performed by: ANESTHESIOLOGY

## 2024-06-17 PROCEDURE — 64615 CHEMODENERV MUSC MIGRAINE: CPT | Performed by: ANESTHESIOLOGY

## 2024-06-17 PROCEDURE — 1036F TOBACCO NON-USER: CPT | Performed by: ANESTHESIOLOGY

## 2024-06-17 PROCEDURE — 99213 OFFICE O/P EST LOW 20 MIN: CPT | Performed by: ANESTHESIOLOGY

## 2024-06-17 PROCEDURE — G8399 PT W/DXA RESULTS DOCUMENT: HCPCS | Performed by: ANESTHESIOLOGY

## 2024-06-17 PROCEDURE — 3017F COLORECTAL CA SCREEN DOC REV: CPT | Performed by: ANESTHESIOLOGY

## 2024-06-17 PROCEDURE — 96372 THER/PROPH/DIAG INJ SC/IM: CPT | Performed by: ANESTHESIOLOGY

## 2024-06-17 PROCEDURE — G8427 DOCREV CUR MEDS BY ELIG CLIN: HCPCS | Performed by: ANESTHESIOLOGY

## 2024-06-17 PROCEDURE — 1123F ACP DISCUSS/DSCN MKR DOCD: CPT | Performed by: ANESTHESIOLOGY

## 2024-06-17 PROCEDURE — G8420 CALC BMI NORM PARAMETERS: HCPCS | Performed by: ANESTHESIOLOGY

## 2024-06-17 NOTE — PROGRESS NOTES
Functionality Assessment/Goals Worksheet     On a scale of 0 (Does not Interfere) to 10 (Completely Interferes)     1.  Which number describes how during the past week pain has interfered with           the following:  A.  General Activity:  2  B.  Mood: 2  C.  Walking Ability:  0  D.  Normal Work (Includes both work outside the home and housework):  2  E.  Relations with Other People:   0  F.  Sleep:   1  G.  Enjoyment of Life:   0    2.  Patient Prefers to Take their Pain Medications:     []  On a regular basis   [x]  Only when necessary    []  Does not take pain medications    3.  What are the Patient's Goals/Expectations for Visiting Pain Management?     []  Learn about my pain    []  Receive Medication   []  Physical Therapy     []  Treat Depression   [x]  Receive Injections    []  Treat Sleep   []  Deal with Anxiety and Stress   []  Treat Opoid Dependence/Addiction   []  Other:                                
Pre-operative Diagnosis: Chronic Migraine Headaches     Post-operative Diagnosis: Chronic Migraine Headaches     Procedure: Botox for migraine headaches     Procedure Description:  Patient was reclined on the examination table. Botox was drawn up into a tuberculin syringes, to a concentration of 5 units per 0.1 mL with a 30-gauge needle. Skin was prepped with alcohol wipes. The following muscles were injected after negative aspiration; The frontalis muscle bilaterally a total of 4 sites (20 units), The  muscle bilaterally a total of 2 sites (10 units), the procerus one site (5 units), the occipitalis bilaterally a total of 6 sites (30 units), The temporalis muscle bilaterally a total of 8 sites (40 units), the massetter muscles bilaterally (20 units), the trapezius bilaterally a total of 6 sites (30 units), and cervical paraspinal muscle groups a total of (45 units). This was a total of 200 units. The patient tolerated the procedure well.    Medications: 4 mL in 200 U Botox drawn up in 4 separate 1 mL TB syringes = 5 U per 0.1 mL syringe    Amount medication wasted: 0 units        Procedural Complications: None        Frankie Garber DO  Interventional Pain Management/PM&R   [facility] [place]     
headaches. The risks and benefits were discussed in detail with the patient. Patient underwent Botox injections in clinic today.    Anticoagulation/NPO Recommendations:   None    Multidisciplinary Pain Management:   In the presence of complex, chronic, and multi-factorial pain, the importance of a multidisciplinary approach to pain management in the patient’s management regimen was emphasized and discussed in great detail.   STRETCHING: Reviewed stretching techniques and given handout instructions on gentle neck stretching/range of motion for posterior neck muscles targeting cervical paraspinal muscles (splenius capitus, semispinalis capitis, splenius cervicis) and trapezius muscles to reduced muscle spasms and tensions that precipitate migraine headaches    Prescriptions Written This Visit:       Follow-up: 3 months    I spent 20 minutes directly involved and greater than 50% of this time was spent reviewing stretching techniques associated with muscle tension that precipitate migraine headaches, reviewing patient's migraine prophylaxis and abortive medications, and discussing alternative treatment options at this visit separate from Botox including massage therapy, physical therapy, acupuncture, and chiropractic adjustments which extended beyond the preservice time associated with the procedure in office.      Frankie Garber DO  Interventional Pain Management/PM&R   [facility] [place]

## 2024-08-12 ENCOUNTER — LAB (OUTPATIENT)
Dept: LAB | Age: 69
End: 2024-08-12

## 2024-08-12 DIAGNOSIS — R73.03 PREDIABETES: ICD-10-CM

## 2024-08-12 DIAGNOSIS — E55.9 VITAMIN D DEFICIENCY: ICD-10-CM

## 2024-08-12 DIAGNOSIS — I10 ESSENTIAL HYPERTENSION: ICD-10-CM

## 2024-08-12 DIAGNOSIS — I25.10 CORONARY ARTERY DISEASE INVOLVING NATIVE CORONARY ARTERY OF NATIVE HEART WITHOUT ANGINA PECTORIS: ICD-10-CM

## 2024-08-12 DIAGNOSIS — E78.2 MIXED HYPERLIPIDEMIA: ICD-10-CM

## 2024-08-12 LAB
25(OH)D3 SERPL-MCNC: 86 NG/ML (ref 30–100)
ALBUMIN SERPL BCG-MCNC: 4.3 G/DL (ref 3.5–5.1)
ALP SERPL-CCNC: 65 U/L (ref 38–126)
ALT SERPL W/O P-5'-P-CCNC: 9 U/L (ref 11–66)
ANION GAP SERPL CALC-SCNC: 15 MEQ/L (ref 8–16)
AST SERPL-CCNC: 13 U/L (ref 5–40)
BASOPHILS ABSOLUTE: 0.1 THOU/MM3 (ref 0–0.1)
BASOPHILS NFR BLD AUTO: 1.3 %
BILIRUB SERPL-MCNC: 0.2 MG/DL (ref 0.3–1.2)
BUN SERPL-MCNC: 21 MG/DL (ref 7–22)
CALCIUM SERPL-MCNC: 9.7 MG/DL (ref 8.5–10.5)
CHLORIDE SERPL-SCNC: 107 MEQ/L (ref 98–111)
CHOLEST SERPL-MCNC: 126 MG/DL (ref 100–199)
CO2 SERPL-SCNC: 19 MEQ/L (ref 23–33)
CREAT SERPL-MCNC: 1.1 MG/DL (ref 0.4–1.2)
DEPRECATED MEAN GLUCOSE BLD GHB EST-ACNC: 111 MG/DL (ref 70–126)
DEPRECATED RDW RBC AUTO: 47.8 FL (ref 35–45)
EOSINOPHIL NFR BLD AUTO: 4.3 %
EOSINOPHILS ABSOLUTE: 0.5 THOU/MM3 (ref 0–0.4)
ERYTHROCYTE [DISTWIDTH] IN BLOOD BY AUTOMATED COUNT: 14.1 % (ref 11.5–14.5)
GFR SERPL CREATININE-BSD FRML MDRD: 55 ML/MIN/1.73M2
GLUCOSE SERPL-MCNC: 99 MG/DL (ref 70–108)
HBA1C MFR BLD HPLC: 5.7 % (ref 4.4–6.4)
HCT VFR BLD AUTO: 42.2 % (ref 37–47)
HDLC SERPL-MCNC: 41 MG/DL
HGB BLD-MCNC: 13.3 GM/DL (ref 12–16)
IMM GRANULOCYTES # BLD AUTO: 0.04 THOU/MM3 (ref 0–0.07)
IMM GRANULOCYTES NFR BLD AUTO: 0.4 %
LDLC SERPL CALC-MCNC: 52 MG/DL
LYMPHOCYTES ABSOLUTE: 2.7 THOU/MM3 (ref 1–4.8)
LYMPHOCYTES NFR BLD AUTO: 25.3 %
MAGNESIUM SERPL-MCNC: 1.8 MG/DL (ref 1.6–2.4)
MCH RBC QN AUTO: 29.5 PG (ref 26–33)
MCHC RBC AUTO-ENTMCNC: 31.5 GM/DL (ref 32.2–35.5)
MCV RBC AUTO: 93.6 FL (ref 81–99)
MONOCYTES ABSOLUTE: 0.9 THOU/MM3 (ref 0.4–1.3)
MONOCYTES NFR BLD AUTO: 8 %
NEUTROPHILS ABSOLUTE: 6.5 THOU/MM3 (ref 1.8–7.7)
NEUTROPHILS NFR BLD AUTO: 60.7 %
NRBC BLD AUTO-RTO: 0 /100 WBC
PLATELET # BLD AUTO: 358 THOU/MM3 (ref 130–400)
PMV BLD AUTO: 10.3 FL (ref 9.4–12.4)
POTASSIUM SERPL-SCNC: 4.4 MEQ/L (ref 3.5–5.2)
PROT SERPL-MCNC: 6.9 G/DL (ref 6.1–8)
RBC # BLD AUTO: 4.51 MILL/MM3 (ref 4.2–5.4)
SODIUM SERPL-SCNC: 141 MEQ/L (ref 135–145)
TRIGL SERPL-MCNC: 163 MG/DL (ref 0–199)
TSH SERPL DL<=0.005 MIU/L-ACNC: 0.55 UIU/ML (ref 0.4–4.2)
WBC # BLD AUTO: 10.7 THOU/MM3 (ref 4.8–10.8)

## 2024-08-19 ENCOUNTER — OFFICE VISIT (OUTPATIENT)
Dept: PULMONOLOGY | Age: 69
End: 2024-08-19
Payer: MEDICARE

## 2024-08-19 VITALS
HEART RATE: 88 BPM | DIASTOLIC BLOOD PRESSURE: 70 MMHG | SYSTOLIC BLOOD PRESSURE: 108 MMHG | BODY MASS INDEX: 24.16 KG/M2 | HEIGHT: 68 IN | TEMPERATURE: 98 F | WEIGHT: 159.4 LBS | OXYGEN SATURATION: 98 %

## 2024-08-19 DIAGNOSIS — F51.04 PSYCHOPHYSIOLOGICAL INSOMNIA: ICD-10-CM

## 2024-08-19 DIAGNOSIS — G47.10 HYPERSOMNIA: ICD-10-CM

## 2024-08-19 DIAGNOSIS — G47.419 PRIMARY NARCOLEPSY WITHOUT CATAPLEXY: Primary | ICD-10-CM

## 2024-08-19 PROCEDURE — G8427 DOCREV CUR MEDS BY ELIG CLIN: HCPCS | Performed by: PHYSICIAN ASSISTANT

## 2024-08-19 PROCEDURE — 3017F COLORECTAL CA SCREEN DOC REV: CPT | Performed by: PHYSICIAN ASSISTANT

## 2024-08-19 PROCEDURE — G8420 CALC BMI NORM PARAMETERS: HCPCS | Performed by: PHYSICIAN ASSISTANT

## 2024-08-19 PROCEDURE — G8399 PT W/DXA RESULTS DOCUMENT: HCPCS | Performed by: PHYSICIAN ASSISTANT

## 2024-08-19 PROCEDURE — 1123F ACP DISCUSS/DSCN MKR DOCD: CPT | Performed by: PHYSICIAN ASSISTANT

## 2024-08-19 PROCEDURE — 99214 OFFICE O/P EST MOD 30 MIN: CPT | Performed by: PHYSICIAN ASSISTANT

## 2024-08-19 PROCEDURE — 1090F PRES/ABSN URINE INCON ASSESS: CPT | Performed by: PHYSICIAN ASSISTANT

## 2024-08-19 PROCEDURE — 1036F TOBACCO NON-USER: CPT | Performed by: PHYSICIAN ASSISTANT

## 2024-08-19 RX ORDER — ARMODAFINIL 250 MG/1
250 TABLET ORAL DAILY
Qty: 30 TABLET | Refills: 5 | Status: SHIPPED | OUTPATIENT
Start: 2024-08-19 | End: 2024-10-18

## 2024-08-19 RX ORDER — METHYLPHENIDATE HYDROCHLORIDE 10 MG/1
10 TABLET ORAL 2 TIMES DAILY
Qty: 60 TABLET | Refills: 0 | Status: SHIPPED | OUTPATIENT
Start: 2024-08-19 | End: 2024-09-18

## 2024-08-19 NOTE — PROGRESS NOTES
[facility]      Tisha Fitch                                         [id number]  2024   Chief Complaint   Patient presents with    Follow-up     6mo med check        Pt of Dr. Sanchez    ESS 16  SAQLI 59    Presentation:   Tisha presents for sleep medicine follow up for narcolepsy  Since the last visit Tisha has been doing reasonably well with Nuvigil and Ritalin.  She can not afford Sunosi or Wakix.  She is more tired and asking for Nuvigil to be increased. She uses Ritalin sparingly.  It does contribute to insomnia    Progress History:   Since last visit any new medical issues? No  New ER or hospitlal visits? No  Any new or changes in medicines? No  Any new sleep medicines? No    Sleep issues:  Do you feel better? Yes and No  More rested? No   Better concentration? no      Past Medical History:   Diagnosis Date    Asthma 2013    Dr. Toribio    Depression 2000      from SCC neck    Fibromyalgia     Hyperlipemia     check per Dr Lynn     Insulin resistance     Narcolepsy     per Dr. Lynn via sleep study    Neck pain     cervical stenosis    PLMD (periodic limb movement disorder)     Prolonged emergence from general anesthesia     Tachycardia        Past Surgical History:   Procedure Laterality Date    ANKLE SURGERY Left 2017    [facility]    ANKLE SURGERY Left 2020    Replacement and tendon repair    CARPAL TUNNEL RELEASE Right     CERVICAL DISCECTOMY  2012    C3-4, C4-5    CERVICAL FUSION N/A 2019    REMOVAL OF PLATE C4-5, ACDF C3-4, C6-7 WITH PLATING/MEDTRONIC performed by Bebeto Morrow MD at [facility] OR    COLONOSCOPY      CORONARY ANGIOPLASTY WITH STENT PLACEMENT  2/12/15    Dr. Lynn    FACET JOINT INJECTION Right 2022    CERVICAL FACET JOINT medial branch blocks  RIGHT Cervical 2- 3  3-4 performed by Frankie Garber DO at [facility] [place] OR    FACET JOINT INJECTION Right

## 2024-09-09 ENCOUNTER — OFFICE VISIT (OUTPATIENT)
Dept: PHYSICAL MEDICINE AND REHAB | Age: 69
End: 2024-09-09
Payer: MEDICARE

## 2024-09-09 VITALS
WEIGHT: 159 LBS | SYSTOLIC BLOOD PRESSURE: 126 MMHG | HEIGHT: 68 IN | BODY MASS INDEX: 24.1 KG/M2 | DIASTOLIC BLOOD PRESSURE: 84 MMHG

## 2024-09-09 DIAGNOSIS — G43.719 INTRACTABLE CHRONIC MIGRAINE WITHOUT AURA AND WITHOUT STATUS MIGRAINOSUS: Primary | ICD-10-CM

## 2024-09-09 DIAGNOSIS — M79.642 LEFT HAND PAIN: ICD-10-CM

## 2024-09-09 DIAGNOSIS — M79.18 MYOFASCIAL PAIN: ICD-10-CM

## 2024-09-09 DIAGNOSIS — G89.29 OTHER CHRONIC PAIN: ICD-10-CM

## 2024-09-09 PROCEDURE — 1036F TOBACCO NON-USER: CPT | Performed by: ANESTHESIOLOGY

## 2024-09-09 PROCEDURE — 1123F ACP DISCUSS/DSCN MKR DOCD: CPT | Performed by: ANESTHESIOLOGY

## 2024-09-09 PROCEDURE — 64615 CHEMODENERV MUSC MIGRAINE: CPT | Performed by: ANESTHESIOLOGY

## 2024-09-09 PROCEDURE — G8399 PT W/DXA RESULTS DOCUMENT: HCPCS | Performed by: ANESTHESIOLOGY

## 2024-09-09 PROCEDURE — G8420 CALC BMI NORM PARAMETERS: HCPCS | Performed by: ANESTHESIOLOGY

## 2024-09-09 PROCEDURE — G8427 DOCREV CUR MEDS BY ELIG CLIN: HCPCS | Performed by: ANESTHESIOLOGY

## 2024-09-09 PROCEDURE — 1090F PRES/ABSN URINE INCON ASSESS: CPT | Performed by: ANESTHESIOLOGY

## 2024-09-09 PROCEDURE — 3017F COLORECTAL CA SCREEN DOC REV: CPT | Performed by: ANESTHESIOLOGY

## 2024-09-09 PROCEDURE — 99214 OFFICE O/P EST MOD 30 MIN: CPT | Performed by: ANESTHESIOLOGY

## 2024-09-09 RX ORDER — CYCLOBENZAPRINE HCL 10 MG
10 TABLET ORAL 3 TIMES DAILY PRN
Qty: 30 TABLET | Refills: 0 | Status: SHIPPED | OUTPATIENT
Start: 2024-09-09 | End: 2024-09-19

## 2024-10-10 ENCOUNTER — HOSPITAL ENCOUNTER (OUTPATIENT)
Dept: GENERAL RADIOLOGY | Age: 69
Discharge: HOME | End: 2024-10-10
Payer: MEDICARE

## 2024-10-10 ENCOUNTER — HOSPITAL ENCOUNTER (OUTPATIENT)
Age: 69
Discharge: HOME | End: 2024-10-10
Payer: MEDICARE

## 2024-10-10 ENCOUNTER — HOSPITAL ENCOUNTER (OUTPATIENT)
Dept: PULMONOLOGY | Age: 69
Discharge: HOME | End: 2024-10-10
Payer: MEDICARE

## 2024-10-10 DIAGNOSIS — J44.89 ASTHMA-COPD OVERLAP SYNDROME (HCC): ICD-10-CM

## 2024-10-10 DIAGNOSIS — R94.2 DECREASED DIFFUSION CAPACITY OF LUNG: ICD-10-CM

## 2024-10-10 DIAGNOSIS — M79.642 LEFT HAND PAIN: ICD-10-CM

## 2024-10-10 PROCEDURE — 94729 DIFFUSING CAPACITY: CPT

## 2024-10-10 PROCEDURE — 94010 BREATHING CAPACITY TEST: CPT

## 2024-10-10 PROCEDURE — 73130 X-RAY EXAM OF HAND: CPT

## 2024-10-14 NOTE — PROGRESS NOTES
is here for follow up for asthma-COPD overlap. Patient is here with stable rene with DLCO. Overall patient reports respiratory symptoms have been stable since last appointment. Patient reports good compliance with inhaled medications (Symbicort). Patient using albuterol 3-4 times last week, prior to this used rarely. Patient reports some physical limitation due to respiratory symptoms. Her past medical history is significant for Asthma, neck pain, insulin resistance, tachycardia, depression, fibromyalgia, narcolepsy (follows [facility]), and hyperlipidemia.     Patient reports more use of levalbuterol in the last week due to cold-like symptoms and congested cough. Her phlegm is fluctuating from white to slightly yellow. She admits to feeling achy, runny nose, and chest congestion. Denies fever/night sweats/chills.    Allergy regimen: claritin and singulair     Progress History:   Using inhalers? Yes Symbicort and as needed Xopenex inhaler  Are they helpful? Yes   Previous inhalers? Albuterol - fast heart rate, Dulera  - expensive   Any recent exacerbations? No  Last A1AT: MM  Last PFT: 2024 - mild obstruction and decreased DLCO  Last 6 MWT: None in Epic  NIOX: 10/17/23 - FeNO 18     Smoking History:  Never smoker  Admits to passive tobacco exposure from parents or work environment. She reports that one of her parents smoked.     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 (Coal, dust, asbestos, molds ie Hay)  [place]  Admits to exposure to pets/animals at home. 1 dog and 2 cats  Denies exposure to tuberculosis.     Flu vaccine: 10/11/24  RSV vaccine: received   Pneumonia vaccine: [id number] 6/25/2015 & [id number] 5/13/2005 & 2020  COVID-19 vaccine: Vaccinated  Results   Lung Nodule Screening     [] Qualifies    [x] Does not qualify   [] Declined    [] Completed  Never smoker   The

## 2024-10-16 ENCOUNTER — OFFICE VISIT (OUTPATIENT)
Dept: PULMONOLOGY | Age: 69
End: 2024-10-16
Payer: MEDICARE

## 2024-10-16 ENCOUNTER — OFFICE VISIT (OUTPATIENT)
Dept: FAMILY MEDICINE CLINIC | Age: 69
End: 2024-10-16

## 2024-10-16 VITALS
TEMPERATURE: 98 F | BODY MASS INDEX: 24.64 KG/M2 | HEIGHT: 68 IN | OXYGEN SATURATION: 97 % | HEART RATE: 75 BPM | WEIGHT: 162.6 LBS | SYSTOLIC BLOOD PRESSURE: 122 MMHG | DIASTOLIC BLOOD PRESSURE: 70 MMHG

## 2024-10-16 VITALS
SYSTOLIC BLOOD PRESSURE: 128 MMHG | OXYGEN SATURATION: 96 % | RESPIRATION RATE: 18 BRPM | BODY MASS INDEX: 24.37 KG/M2 | HEIGHT: 68 IN | HEART RATE: 75 BPM | TEMPERATURE: 97.6 F | WEIGHT: 160.8 LBS | DIASTOLIC BLOOD PRESSURE: 84 MMHG

## 2024-10-16 DIAGNOSIS — J44.89 ASTHMA-COPD OVERLAP SYNDROME (HCC): Primary | ICD-10-CM

## 2024-10-16 DIAGNOSIS — I10 ESSENTIAL HYPERTENSION: ICD-10-CM

## 2024-10-16 DIAGNOSIS — R51.9 RIGHT-SIDED HEADACHE: ICD-10-CM

## 2024-10-16 DIAGNOSIS — R94.2 DECREASED DIFFUSION CAPACITY OF LUNG: ICD-10-CM

## 2024-10-16 DIAGNOSIS — M17.0 OSTEOARTHRITIS OF BOTH KNEES, UNSPECIFIED OSTEOARTHRITIS TYPE: ICD-10-CM

## 2024-10-16 DIAGNOSIS — F33.41 RECURRENT MAJOR DEPRESSIVE DISORDER, IN PARTIAL REMISSION (HCC): ICD-10-CM

## 2024-10-16 DIAGNOSIS — R20.9 COLD EXTREMITIES: ICD-10-CM

## 2024-10-16 DIAGNOSIS — K21.9 GASTROESOPHAGEAL REFLUX DISEASE WITHOUT ESOPHAGITIS: ICD-10-CM

## 2024-10-16 DIAGNOSIS — L21.9 DERMATITIS, SEBORRHEIC: Primary | ICD-10-CM

## 2024-10-16 DIAGNOSIS — E88.810 METABOLIC SYNDROME: ICD-10-CM

## 2024-10-16 PROCEDURE — G8427 DOCREV CUR MEDS BY ELIG CLIN: HCPCS

## 2024-10-16 PROCEDURE — 3023F SPIROM DOC REV: CPT

## 2024-10-16 PROCEDURE — 1090F PRES/ABSN URINE INCON ASSESS: CPT

## 2024-10-16 PROCEDURE — 1036F TOBACCO NON-USER: CPT

## 2024-10-16 PROCEDURE — 1123F ACP DISCUSS/DSCN MKR DOCD: CPT

## 2024-10-16 PROCEDURE — G8420 CALC BMI NORM PARAMETERS: HCPCS

## 2024-10-16 PROCEDURE — G8399 PT W/DXA RESULTS DOCUMENT: HCPCS

## 2024-10-16 PROCEDURE — 99214 OFFICE O/P EST MOD 30 MIN: CPT

## 2024-10-16 PROCEDURE — G8482 FLU IMMUNIZE ORDER/ADMIN: HCPCS

## 2024-10-16 PROCEDURE — 3017F COLORECTAL CA SCREEN DOC REV: CPT

## 2024-10-16 RX ORDER — METFORMIN HYDROCHLORIDE 500 MG/1
1000 TABLET, EXTENDED RELEASE ORAL 2 TIMES DAILY
Qty: 360 TABLET | Refills: 1 | Status: SHIPPED | OUTPATIENT
Start: 2024-10-16

## 2024-10-16 RX ORDER — OMEPRAZOLE 40 MG/1
40 CAPSULE, DELAYED RELEASE ORAL DAILY
Qty: 90 CAPSULE | Refills: 1 | Status: SHIPPED | OUTPATIENT
Start: 2024-10-16

## 2024-10-16 RX ORDER — LEVALBUTEROL INHALATION SOLUTION 0.63 MG/3ML
0.63 SOLUTION RESPIRATORY (INHALATION) EVERY 6 HOURS PRN
Qty: 120 EACH | Refills: 11 | Status: SHIPPED | OUTPATIENT
Start: 2024-10-16

## 2024-10-16 RX ORDER — CYCLOBENZAPRINE HCL 10 MG
10 TABLET ORAL DAILY PRN
Qty: 30 TABLET | Refills: 1 | Status: SHIPPED | OUTPATIENT
Start: 2024-10-16 | End: 2024-12-15

## 2024-10-16 RX ORDER — TOPIRAMATE 100 MG/1
100 TABLET, FILM COATED ORAL 2 TIMES DAILY
Qty: 180 TABLET | Refills: 1 | Status: SHIPPED | OUTPATIENT
Start: 2024-10-16

## 2024-10-16 RX ORDER — VENLAFAXINE HYDROCHLORIDE 150 MG/1
CAPSULE, EXTENDED RELEASE ORAL
Qty: 180 CAPSULE | Refills: 1 | Status: SHIPPED | OUTPATIENT
Start: 2024-10-16

## 2024-10-16 RX ORDER — RIZATRIPTAN BENZOATE 10 MG/1
10 TABLET ORAL
Qty: 9 TABLET | Refills: 3 | Status: SHIPPED | OUTPATIENT
Start: 2024-10-16 | End: 2024-10-16

## 2024-10-16 RX ORDER — LOSARTAN POTASSIUM 25 MG/1
TABLET ORAL
Qty: 90 TABLET | Refills: 1 | Status: CANCELLED | OUTPATIENT
Start: 2024-10-16

## 2024-10-16 SDOH — ECONOMIC STABILITY: FOOD INSECURITY: WITHIN THE PAST 12 MONTHS, YOU WORRIED THAT YOUR FOOD WOULD RUN OUT BEFORE YOU GOT MONEY TO BUY MORE.: NEVER TRUE

## 2024-10-16 SDOH — ECONOMIC STABILITY: FOOD INSECURITY: WITHIN THE PAST 12 MONTHS, THE FOOD YOU BOUGHT JUST DIDN'T LAST AND YOU DIDN'T HAVE MONEY TO GET MORE.: NEVER TRUE

## 2024-10-16 SDOH — ECONOMIC STABILITY: INCOME INSECURITY: HOW HARD IS IT FOR YOU TO PAY FOR THE VERY BASICS LIKE FOOD, HOUSING, MEDICAL CARE, AND HEATING?: NOT HARD AT ALL

## 2024-10-16 ASSESSMENT — ENCOUNTER SYMPTOMS
CHEST TIGHTNESS: 1
SINUS PAIN: 0
SHORTNESS OF BREATH: 1
SINUS PRESSURE: 0
WHEEZING: 0
RHINORRHEA: 1
COUGH: 1

## 2024-10-16 NOTE — PROGRESS NOTES
Attending Physician Note    I have seen and evaluated the patient. I discussed the findings, assessment and plan with the resident and agree with the resident's findings and plan as documented in the resident's note.  GC modifier added.    Brief summary:  Cold sensation left foot.  Known CAD.  Peripheral pulses palpable.  Check ALCIDES.  SK left chest - reassured pt.  Pre-diabetes - continue metformin.  Migraine syndrome - pt aware of risk related to triptan use in context of CAD (and possibly occult stroke - abnormality seen on CT).  Has discussed with neurologist and insurance authorization for alternative medication is being sought.  Discuss at upcoming cardiologist visit also.  Chronic gerd.  Gastritis on EGD 2016.  Cont omeprazole 40mg daily for now but she will discuss with GI early next year when she returns for follow up cscope.  Check with cardiologist about decreasing dose of ASA to 81mg daily.  
Future  New concern, recommended completeing ABIs    8. Osteoarthritis of both knees, unspecified osteoarthritis type  -     Handicap placard    Future Appointments   Date Time Provider Department Center   10/29/2024  7:00 AM STR CT IMAGING [facility] EXP STR Rad/Card   11/7/2024  9:30 AM Humberto Pierson MD N [facility] Heart [facility] [place]   12/6/2024 10:40 AM Frankie Garber DO N [facility] Pain [facility] [place]   2/17/2025 10:15 AM Mary Thayer PA-C N Pulm Med [facility] [place]   10/20/2025 11:00 AM Kelley Callahan APRN - CNP N Pulm Med [facility] [place]       Return in about 6 months (around 4/16/2025) for Follow up on chronic conditions.    An electronic signature was used to authenticate this note  - Regan Vera MD PGY-3 on 10/16/2024 at 1:12 PM    This note was electronically signed. Parts of this note may have been dictated by use of voice recognition software and electronically transcribed. The note may contain errors not detected in proofreading. Please refer to my supervising physician's documentation if my documentation differs.

## 2024-10-29 ENCOUNTER — HOSPITAL ENCOUNTER (OUTPATIENT)
Dept: CT IMAGING | Age: 69
Discharge: HOME | End: 2024-10-29
Payer: MEDICARE

## 2024-10-29 DIAGNOSIS — R94.2 DECREASED DIFFUSION CAPACITY OF LUNG: ICD-10-CM

## 2024-10-29 DIAGNOSIS — J44.89 ASTHMA-COPD OVERLAP SYNDROME (HCC): ICD-10-CM

## 2024-10-29 PROCEDURE — 71250 CT THORAX DX C-: CPT

## 2024-11-07 ENCOUNTER — OFFICE VISIT (OUTPATIENT)
Dept: CARDIOLOGY CLINIC | Age: 69
End: 2024-11-07

## 2024-11-07 VITALS
BODY MASS INDEX: 24.02 KG/M2 | DIASTOLIC BLOOD PRESSURE: 64 MMHG | SYSTOLIC BLOOD PRESSURE: 110 MMHG | HEART RATE: 67 BPM | WEIGHT: 158 LBS

## 2024-11-07 DIAGNOSIS — E78.01 FAMILIAL HYPERCHOLESTEROLEMIA: ICD-10-CM

## 2024-11-07 DIAGNOSIS — I25.10 CORONARY ARTERY DISEASE INVOLVING NATIVE CORONARY ARTERY OF NATIVE HEART WITHOUT ANGINA PECTORIS: Primary | ICD-10-CM

## 2024-11-07 DIAGNOSIS — I10 ESSENTIAL HYPERTENSION: ICD-10-CM

## 2024-11-07 RX ORDER — ROSUVASTATIN CALCIUM 40 MG/1
40 TABLET, COATED ORAL EVERY EVENING
Qty: 90 TABLET | Refills: 3 | Status: SHIPPED | OUTPATIENT
Start: 2024-11-07

## 2024-11-07 RX ORDER — ATENOLOL 50 MG/1
TABLET ORAL
Qty: 135 TABLET | Refills: 3 | Status: SHIPPED | OUTPATIENT
Start: 2024-11-07

## 2024-11-07 ASSESSMENT — ENCOUNTER SYMPTOMS
BLOOD IN STOOL: 0
CHEST TIGHTNESS: 0
CONSTIPATION: 0
VOMITING: 0
DIARRHEA: 0
ABDOMINAL PAIN: 0
SHORTNESS OF BREATH: 0
BACK PAIN: 0
COLOR CHANGE: 0
RECTAL PAIN: 0
PHOTOPHOBIA: 0
ABDOMINAL DISTENTION: 0
NAUSEA: 0
ANAL BLEEDING: 0

## 2024-11-07 NOTE — PROGRESS NOTES
New patient.  Former Dr. Lynn.  Patient didn't bring medication list or bottles.  Patient states medication haven't changed since last check on 10-16-24.  EKG done today.   
Past    Smokeless tobacco: Never    Tobacco comments:     Never smoker   Substance Use Topics    Alcohol use: Yes     Comment: rarely      Current Outpatient Medications   Medication Sig Dispense Refill    levalbuterol (XOPENEX) 0.63 MG/3ML nebulization Take 3 mLs by nebulization every 6 hours as needed for Wheezing or Shortness of Breath 120 each 11    metFORMIN (GLUCOPHAGE-XR) 500 MG extended release tablet Take 2 tablets by mouth in the morning and at bedtime 360 tablet 1    topiramate (TOPAMAX) 100 MG tablet Take 1 tablet by mouth 2 times daily 180 tablet 1    venlafaxine (EFFEXOR XR) 150 MG extended release capsule TAKE ONE CAPSULE BY MOUTH TWICE A  capsule 1    cyclobenzaprine (FLEXERIL) 10 MG tablet Take 1 tablet by mouth daily as needed for Muscle spasms 30 tablet 1    omeprazole (PRILOSEC) 40 MG delayed release capsule Take 1 capsule by mouth daily 90 capsule 1    montelukast (SINGULAIR) 10 MG tablet TAKE ONE TABLET BY MOUTH EVERY NIGHT 30 tablet 11    losartan (COZAAR) 25 MG tablet TAKE ONE TABLET BY MOUTH NIGHTLY 90 tablet 1    budesonide-formoterol (SYMBICORT) 160-4.5 MCG/ACT AERO Inhale 2 puffs into the lungs 2 times daily 3 each 3    levalbuterol (XOPENEX HFA) 45 MCG/ACT inhaler Inhale 2 puffs into the lungs every 6 hours as needed for Wheezing or Shortness of Breath 3 each 3    fluticasone (FLONASE) 50 MCG/ACT nasal spray 2 sprays by Each Nostril route daily 16 g 0    Multiple Vitamins-Minerals (PRESERVISION AREDS 2 PO) Take by mouth      atenolol (TENORMIN) 50 MG tablet TAKE 1 & 1/2 (ONE & ONE-HALF) TABLETS BY MOUTH ONCE DAILY 135 tablet 4    rosuvastatin (CRESTOR) 40 MG tablet Take 1 tablet by mouth every evening 90 tablet 4    nitroGLYCERIN (NITROSTAT) 0.4 MG SL tablet Place 1 tablet under the tongue every 5 minutes as needed for Chest pain 25 tablet 3    aspirin 325 MG tablet       Multiple Vitamins-Minerals (THERAPEUTIC MULTIVITAMIN-MINERALS) tablet Take 1 tablet by mouth daily

## 2024-11-27 DIAGNOSIS — I10 ESSENTIAL HYPERTENSION: ICD-10-CM

## 2024-11-27 RX ORDER — LOSARTAN POTASSIUM 25 MG/1
TABLET ORAL
Qty: 90 TABLET | Refills: 0 | Status: SHIPPED | OUTPATIENT
Start: 2024-11-27

## 2024-11-27 NOTE — TELEPHONE ENCOUNTER
Patient's last appointment was: 10/16/2024  with our   Patient's next appointment is: 4/16/2025  with our Dr. De Luna  Last refilled on: 04/11/2024  Which pharmacy does the script need sent to: [facility] [address]      Lab Results   Component Value Date    LABA1C 5.7 08/12/2024     Lab Results   Component Value Date    CHOL 126 08/12/2024    TRIG 163 08/12/2024    HDL 41 08/12/2024     Lab Results   Component Value Date     08/12/2024    K 4.4 08/12/2024     08/12/2024    CO2 19 (L) 08/12/2024    BUN 21 08/12/2024    CREATININE 1.1 08/12/2024    GLUCOSE 99 08/12/2024    CALCIUM 9.7 08/12/2024    BILITOT 0.2 (L) 08/12/2024    ALKPHOS 65 08/12/2024    AST 13 08/12/2024    ALT 9 (L) 08/12/2024    LABGLOM 55 (A) 08/12/2024     Lab Results   Component Value Date    TSH 0.553 08/12/2024    T4FREE 1.10 04/21/2023     Lab Results   Component Value Date    WBC 10.7 08/12/2024    HGB 13.3 08/12/2024    HCT 42.2 08/12/2024    MCV 93.6 08/12/2024     08/12/2024

## 2024-12-06 ENCOUNTER — OFFICE VISIT (OUTPATIENT)
Dept: PHYSICAL MEDICINE AND REHAB | Age: 69
End: 2024-12-06

## 2024-12-06 VITALS
WEIGHT: 158 LBS | BODY MASS INDEX: 23.95 KG/M2 | SYSTOLIC BLOOD PRESSURE: 94 MMHG | DIASTOLIC BLOOD PRESSURE: 66 MMHG | HEIGHT: 68 IN

## 2024-12-06 DIAGNOSIS — M79.642 LEFT HAND PAIN: ICD-10-CM

## 2024-12-06 DIAGNOSIS — G43.719 INTRACTABLE CHRONIC MIGRAINE WITHOUT AURA AND WITHOUT STATUS MIGRAINOSUS: Primary | ICD-10-CM

## 2024-12-06 DIAGNOSIS — M79.18 MYOFASCIAL PAIN: ICD-10-CM

## 2024-12-06 DIAGNOSIS — G89.29 OTHER CHRONIC PAIN: ICD-10-CM

## 2024-12-06 NOTE — PROGRESS NOTES
Pre-operative Diagnosis: Chronic Migraine Headaches     Post-operative Diagnosis: Chronic Migraine Headaches     Procedure: Botox for migraine headaches     Procedure Description:  Patient was reclined on the examination table. Botox was drawn up into a tuberculin syringes, to a concentration of 5 units per 0.1 mL with a 30-gauge needle. Skin was prepped with alcohol wipes. The following muscles were injected after negative aspiration; The frontalis muscle bilaterally a total of 4 sites (20 units), The  muscle bilaterally a total of 2 sites (10 units), the procerus one site (5 units), the occipitalis bilaterally a total of 6 sites (30 units), The temporalis muscle bilaterally a total of 8 sites (40 units), the massetter muscles bilaterally (20 units), the trapezius bilaterally a total of 6 sites (30 units), and cervical paraspinal muscle groups a total of (45 units). This was a total of 200 units. The patient tolerated the procedure well.    Medications: 4 mL in 200 U Botox drawn up in 4 separate 1 mL TB syringes = 5 U per 0.1 mL syringe    Amount medication wasted: 0 units        Procedural Complications: None        Frankie Gabrer DO  Interventional Pain Management/PM&R   [facility] [place]

## 2024-12-08 RX ORDER — UBROGEPANT 50 MG/1
50 TABLET ORAL
Qty: 16 TABLET | Refills: 0 | Status: SHIPPED | OUTPATIENT
Start: 2024-12-08

## 2024-12-09 NOTE — TELEPHONE ENCOUNTER
----- Message from Pj Olivas DO sent at 12/28/2023 11:54 AM EST -----  DEXA bone scan results demonstrated worsening osteopenia from previous scan, but no evidence of osteoporosis at this time. Demonstrates medium risk for fracture. Would recommend taking 1200 mg of calcium a day +800 vitamin D supplementation, but no indication for osteoporosis treatment at this time.    Dr. Olivas   [place] Pregnancy And Lactation Text: There is insufficient data to evaluate drug-associated risk for major birth defects, miscarriage, or other adverse maternal or fetal outcomes.  There is a pregnancy registry that monitors pregnancy outcomes in pregnant persons exposed to the medication during pregnancy.  It is unknown if this medication is excreted in breast milk.  Do not breastfeed during treatment and for about 4 weeks after the last dose.

## 2025-01-06 ENCOUNTER — OFFICE VISIT (OUTPATIENT)
Dept: FAMILY MEDICINE CLINIC | Age: 70
End: 2025-01-06
Payer: MEDICARE

## 2025-01-06 VITALS
HEART RATE: 88 BPM | DIASTOLIC BLOOD PRESSURE: 80 MMHG | BODY MASS INDEX: 24.31 KG/M2 | TEMPERATURE: 98.2 F | SYSTOLIC BLOOD PRESSURE: 120 MMHG | RESPIRATION RATE: 16 BRPM | HEIGHT: 68 IN | WEIGHT: 160.4 LBS

## 2025-01-06 DIAGNOSIS — J45.901 MILD ASTHMA EXACERBATION: ICD-10-CM

## 2025-01-06 DIAGNOSIS — R05.1 ACUTE COUGH: Primary | ICD-10-CM

## 2025-01-06 LAB
Lab: NORMAL
QC PASS/FAIL: NORMAL
SARS-COV-2 RDRP RESP QL NAA+PROBE: NEGATIVE

## 2025-01-06 PROCEDURE — G8420 CALC BMI NORM PARAMETERS: HCPCS

## 2025-01-06 PROCEDURE — 3017F COLORECTAL CA SCREEN DOC REV: CPT

## 2025-01-06 PROCEDURE — 1159F MED LIST DOCD IN RCRD: CPT

## 2025-01-06 PROCEDURE — 1090F PRES/ABSN URINE INCON ASSESS: CPT

## 2025-01-06 PROCEDURE — G8427 DOCREV CUR MEDS BY ELIG CLIN: HCPCS

## 2025-01-06 PROCEDURE — 99213 OFFICE O/P EST LOW 20 MIN: CPT

## 2025-01-06 PROCEDURE — 87635 SARS-COV-2 COVID-19 AMP PRB: CPT

## 2025-01-06 PROCEDURE — 1123F ACP DISCUSS/DSCN MKR DOCD: CPT

## 2025-01-06 PROCEDURE — 1036F TOBACCO NON-USER: CPT

## 2025-01-06 PROCEDURE — G8399 PT W/DXA RESULTS DOCUMENT: HCPCS

## 2025-01-06 RX ORDER — PREDNISONE 20 MG/1
40 TABLET ORAL DAILY
Qty: 10 TABLET | Refills: 0 | Status: SHIPPED | OUTPATIENT
Start: 2025-01-06 | End: 2025-01-11

## 2025-01-06 RX ORDER — AZITHROMYCIN 250 MG/1
TABLET, FILM COATED ORAL
Qty: 6 TABLET | Refills: 0 | Status: SHIPPED | OUTPATIENT
Start: 2025-01-06 | End: 2025-01-16

## 2025-01-06 RX ORDER — BENZONATATE 200 MG/1
200 CAPSULE ORAL 3 TIMES DAILY PRN
Qty: 90 CAPSULE | Refills: 0 | Status: SHIPPED | OUTPATIENT
Start: 2025-01-06 | End: 2025-02-05

## 2025-01-06 ASSESSMENT — PATIENT HEALTH QUESTIONNAIRE - PHQ9
5. POOR APPETITE OR OVEREATING: NOT AT ALL
6. FEELING BAD ABOUT YOURSELF - OR THAT YOU ARE A FAILURE OR HAVE LET YOURSELF OR YOUR FAMILY DOWN: NOT AT ALL
8. MOVING OR SPEAKING SO SLOWLY THAT OTHER PEOPLE COULD HAVE NOTICED. OR THE OPPOSITE, BEING SO FIGETY OR RESTLESS THAT YOU HAVE BEEN MOVING AROUND A LOT MORE THAN USUAL: NOT AT ALL
SUM OF ALL RESPONSES TO PHQ QUESTIONS 1-9: 0
7. TROUBLE CONCENTRATING ON THINGS, SUCH AS READING THE NEWSPAPER OR WATCHING TELEVISION: NOT AT ALL
1. LITTLE INTEREST OR PLEASURE IN DOING THINGS: NOT AT ALL
SUM OF ALL RESPONSES TO PHQ QUESTIONS 1-9: 0
2. FEELING DOWN, DEPRESSED OR HOPELESS: NOT AT ALL
4. FEELING TIRED OR HAVING LITTLE ENERGY: NOT AT ALL
10. IF YOU CHECKED OFF ANY PROBLEMS, HOW DIFFICULT HAVE THESE PROBLEMS MADE IT FOR YOU TO DO YOUR WORK, TAKE CARE OF THINGS AT HOME, OR GET ALONG WITH OTHER PEOPLE: NOT DIFFICULT AT ALL
SUM OF ALL RESPONSES TO PHQ QUESTIONS 1-9: 0
SUM OF ALL RESPONSES TO PHQ QUESTIONS 1-9: 0
SUM OF ALL RESPONSES TO PHQ9 QUESTIONS 1 & 2: 0
3. TROUBLE FALLING OR STAYING ASLEEP: NOT AT ALL
9. THOUGHTS THAT YOU WOULD BE BETTER OFF DEAD, OR OF HURTING YOURSELF: NOT AT ALL

## 2025-01-06 NOTE — PATIENT INSTRUCTIONS
Thank you   Thank you for trusting us with your healthcare needs. You may receive a survey regarding today's visit. It would help us out if you would take a few moments to provide your feedback. We value your input.  Please bring in ALL medications BOTTLES, including inhalers, herbal supplements, over the counter, prescribed & non-prescribed medicine. The office would like actual medication bottles and a list.         4.  Prior to getting your labs drawn, check with your insurance company for benefits and eligibility of lab services.  Often, insurance companies cover certain tests for preventative visits only.  It is patient's responsibility to    see what is covered.    5.  If the list below has been completed, PLEASE FAX RECORDS TO OUR OFFICE @ [phone]. Once the records have been received we will update your records at our office:  Health Maintenance Due   Topic Date Due    COVID-19 Vaccine (4 - 2023-24 season) 09/01/2024    Annual Wellness Visit (Medicare)  11/02/2024

## 2025-01-06 NOTE — PROGRESS NOTES
S: 69 y.o. female with   Chief Complaint   Patient presents with    Cough     Sinus Pressure and cough started about 4 days ago       HPI: please see resident note for HPI and ROS.    BP Readings from Last 3 Encounters:   01/06/25 120/80   12/06/24 94/66   11/07/24 110/64     Wt Readings from Last 3 Encounters:   01/06/25 72.8 kg (160 lb 6.4 oz)   12/06/24 71.7 kg (158 lb)   11/07/24 71.7 kg (158 lb)       O: VS:  height is 1.727 m (5' 7.99\") and weight is 72.8 kg (160 lb 6.4 oz). Her oral temperature is 98.2 °F (36.8 °C). Her blood pressure is 120/80 and her pulse is 88. Her respiration is 16.   Physical exam performed by resident physician     Diagnosis Orders   1. Acute cough  POCT COVID-19 Rapid, NAAT    benzonatate (TESSALON) 200 MG capsule    predniSONE (DELTASONE) 20 MG tablet    azithromycin (ZITHROMAX) 250 MG tablet      2. Mild asthma exacerbation  POCT COVID-19 Rapid, NAAT    benzonatate (TESSALON) 200 MG capsule    predniSONE (DELTASONE) 20 MG tablet    azithromycin (ZITHROMAX) 250 MG tablet          Plan:  Please refer to resident note for full plan.    69 year old female presents to the office for a 5 day history of cough and sinus pressure congestion. History of asthma with increased use of albuterol. Concern for possible asthma/copd flair. Plan to continue albuterol/nebulizer therapy, covid in office negative. Will treat with medication as above and follow up for symptoms if does not improve.       Health Maintenance Due   Topic Date Due    COVID-19 Vaccine (4 - 2023-24 season) 09/01/2024    Annual Wellness Visit (Medicare)  11/02/2024       Attending Physician Statement  I have discussed the case, including pertinent history and exam findings with the resident.  I agree with the documented assessment and plan as documented by the resident.  ILIANA Celaya Jr., DO 1/9/2025 2:26 PM

## 2025-01-06 NOTE — PROGRESS NOTES
[facility] PROFESSIONAL SERVS  [facility] MEDICINE PRACTICE  [address]  [facility] [address]  Dept: [phone]  Dept Fax: [phone]  Loc: [phone]  Resident Note    Patient:Tisha Fitch Sex: female  YOB: 1955 Age:69 y.o.  MRN: [id number]  Assessment & Plan   1. Acute cough  2. Mild asthma exacerbation  - Acute issues of 5 days with congestion and sinus pressure. Exam significant for slight wheeze in upper lung fields and patient endorsing using Albuterol inhaler more often  - POC COVID negative in office  - Will treat with Prednisone 40mg x5days and cover with Azithromycin  Orders  - POCT COVID-19 Rapid, NAAT  - benzonatate (TESSALON) 200 MG capsule; Take 1 capsule by mouth 3 times daily as needed for Cough  Dispense: 90 capsule; Refill: 0  - predniSONE (DELTASONE) 20 MG tablet; Take 2 tablets by mouth daily for 5 days  Dispense: 10 tablet; Refill: 0  - azithromycin (ZITHROMAX) 250 MG tablet; 500mg on day 1 followed by 250mg on days 2 - 5  Dispense: 6 tablet; Refill: 0    No follow-ups on file.  Future Appointments   Date Time Provider Department Center   2/17/2025 10:15 AM Mary Thayer PA-C N Pulm Med [facility]   2/28/2025 11:20 AM Frankie Garber DO N [facility] Pain [facility]   4/16/2025  1:00 PM Belem De Luna MD [facility] FM RES [facility]   10/20/2025 11:00 AM Kelley Callahan, APRN - CNP N Pulm Med [facility]   11/13/2025 10:15 AM Humberto Pierson MD N [facility] Heart [facility]     History of Present Illness   Tisha Fitch is a 69 y.o. female who presents today for:  Chief Complaint   Patient presents with    Cough     Sinus Pressure and cough started about 4 days ago     Cough  - Started assisted through last week with a cough and sinus pressure as well as some throat swelling  - Blowing nose but not getting much out. Has some scabs on nose from old blood  - Has asthma with COPD component. Years of second hand smoke as a kid  -

## 2025-01-06 NOTE — PROGRESS NOTES
Health Maintenance Due   Topic Date Due    COVID-19 Vaccine (4 - 2023-24 season) 09/01/2024    Annual Wellness Visit (Medicare)  11/02/2024

## 2025-01-08 ASSESSMENT — ENCOUNTER SYMPTOMS
SINUS PRESSURE: 1
COUGH: 1
DIARRHEA: 0
SHORTNESS OF BREATH: 1

## 2025-02-07 ENCOUNTER — TELEPHONE (OUTPATIENT)
Dept: PULMONOLOGY | Age: 70
End: 2025-02-07

## 2025-02-07 NOTE — TELEPHONE ENCOUNTER
LVM pt needs to get r/s with another provider here in office due to provider no longer practicing here in office .

## 2025-02-21 DIAGNOSIS — I10 ESSENTIAL HYPERTENSION: ICD-10-CM

## 2025-02-21 RX ORDER — LOSARTAN POTASSIUM 25 MG/1
TABLET ORAL
Qty: 90 TABLET | Refills: 0 | Status: SHIPPED | OUTPATIENT
Start: 2025-02-21

## 2025-02-21 NOTE — TELEPHONE ENCOUNTER
Patient's last appointment was: 1/6/2025  with our   Patient's next appointment is: 4/16/2025  with our Dr. De Luna  Last refilled on: 11/27/2024  Which pharmacy does the script need sent to: [facility], [address]      Lab Results   Component Value Date    LABA1C 5.7 08/12/2024     Lab Results   Component Value Date    CHOL 126 08/12/2024    TRIG 163 08/12/2024    HDL 41 08/12/2024     Lab Results   Component Value Date     08/12/2024    K 4.4 08/12/2024     08/12/2024    CO2 19 (L) 08/12/2024    BUN 21 08/12/2024    CREATININE 1.1 08/12/2024    GLUCOSE 99 08/12/2024    CALCIUM 9.7 08/12/2024    BILITOT 0.2 (L) 08/12/2024    ALKPHOS 65 08/12/2024    AST 13 08/12/2024    ALT 9 (L) 08/12/2024    LABGLOM 55 (A) 08/12/2024     Lab Results   Component Value Date    TSH 0.553 08/12/2024    T4FREE 1.10 04/21/2023     Lab Results   Component Value Date    WBC 10.7 08/12/2024    HGB 13.3 08/12/2024    HCT 42.2 08/12/2024    MCV 93.6 08/12/2024     08/12/2024

## 2025-02-28 ENCOUNTER — OFFICE VISIT (OUTPATIENT)
Dept: PHYSICAL MEDICINE AND REHAB | Age: 70
End: 2025-02-28

## 2025-02-28 VITALS
HEIGHT: 68 IN | DIASTOLIC BLOOD PRESSURE: 84 MMHG | SYSTOLIC BLOOD PRESSURE: 128 MMHG | BODY MASS INDEX: 24.32 KG/M2 | WEIGHT: 160.5 LBS

## 2025-02-28 DIAGNOSIS — G43.E09 CHRONIC MIGRAINE WITH AURA WITHOUT STATUS MIGRAINOSUS, NOT INTRACTABLE: ICD-10-CM

## 2025-02-28 DIAGNOSIS — M79.18 MYOFASCIAL PAIN: ICD-10-CM

## 2025-02-28 DIAGNOSIS — M53.3 ACUTE COCCYGEAL PAIN: Primary | ICD-10-CM

## 2025-02-28 DIAGNOSIS — G89.29 OTHER CHRONIC PAIN: ICD-10-CM

## 2025-03-10 ENCOUNTER — OFFICE VISIT (OUTPATIENT)
Dept: FAMILY MEDICINE CLINIC | Age: 70
End: 2025-03-10
Payer: MEDICARE

## 2025-03-10 VITALS
BODY MASS INDEX: 24.19 KG/M2 | WEIGHT: 159.6 LBS | RESPIRATION RATE: 16 BRPM | DIASTOLIC BLOOD PRESSURE: 76 MMHG | HEIGHT: 68 IN | OXYGEN SATURATION: 98 % | HEART RATE: 96 BPM | SYSTOLIC BLOOD PRESSURE: 118 MMHG | TEMPERATURE: 98.8 F

## 2025-03-10 DIAGNOSIS — J10.1 INFLUENZA A: Primary | ICD-10-CM

## 2025-03-10 LAB
INFLUENZA VIRUS A RNA: NORMAL
INFLUENZA VIRUS B RNA: NORMAL
Lab: NORMAL
QC PASS/FAIL: NORMAL
SARS-COV-2 RDRP RESP QL NAA+PROBE: NEGATIVE

## 2025-03-10 PROCEDURE — 87502 INFLUENZA DNA AMP PROBE: CPT

## 2025-03-10 PROCEDURE — G8427 DOCREV CUR MEDS BY ELIG CLIN: HCPCS

## 2025-03-10 PROCEDURE — 3017F COLORECTAL CA SCREEN DOC REV: CPT

## 2025-03-10 PROCEDURE — G8399 PT W/DXA RESULTS DOCUMENT: HCPCS

## 2025-03-10 PROCEDURE — 1090F PRES/ABSN URINE INCON ASSESS: CPT

## 2025-03-10 PROCEDURE — G8420 CALC BMI NORM PARAMETERS: HCPCS

## 2025-03-10 PROCEDURE — 87635 SARS-COV-2 COVID-19 AMP PRB: CPT

## 2025-03-10 PROCEDURE — 1159F MED LIST DOCD IN RCRD: CPT

## 2025-03-10 PROCEDURE — 99213 OFFICE O/P EST LOW 20 MIN: CPT

## 2025-03-10 PROCEDURE — 1123F ACP DISCUSS/DSCN MKR DOCD: CPT

## 2025-03-10 PROCEDURE — 1036F TOBACCO NON-USER: CPT

## 2025-03-10 RX ORDER — OSELTAMIVIR PHOSPHATE 75 MG/1
75 CAPSULE ORAL 2 TIMES DAILY
Qty: 10 CAPSULE | Refills: 0 | Status: SHIPPED | OUTPATIENT
Start: 2025-03-10 | End: 2025-03-15

## 2025-03-10 SDOH — ECONOMIC STABILITY: FOOD INSECURITY: WITHIN THE PAST 12 MONTHS, YOU WORRIED THAT YOUR FOOD WOULD RUN OUT BEFORE YOU GOT MONEY TO BUY MORE.: NEVER TRUE

## 2025-03-10 SDOH — ECONOMIC STABILITY: FOOD INSECURITY: WITHIN THE PAST 12 MONTHS, THE FOOD YOU BOUGHT JUST DIDN'T LAST AND YOU DIDN'T HAVE MONEY TO GET MORE.: NEVER TRUE

## 2025-03-10 ASSESSMENT — ENCOUNTER SYMPTOMS
COUGH: 1
NAUSEA: 0
DIARRHEA: 0
VOMITING: 0
ABDOMINAL PAIN: 0
SORE THROAT: 1

## 2025-03-10 NOTE — PROGRESS NOTES
Patient:Tisha Fitch  YOB: 1955  MRN: [id number]    Subjective   69 y.o. female who presents for the following: Cough (Sinus congestion, bilateral rib pain from coughing, headache, bilateral ear pain and increases when coughing started Saturday morning)  Last seen on 1/6/25    Patient states her symptoms started on 3/8/25. Patient states her granddaughter may have been her sick contact, as she has been babysitting the grandchildren.  States that her symptoms include but are not limited to ear discomfort, eye discomfort subjective fevers.  Patient has been controlling symptoms with Tylenol and over-the-counter medications      HPI  Review of Systems   Review of Systems   Constitutional:  Positive for fatigue. Negative for fever.   HENT:  Positive for sore throat. Negative for ear discharge.    Respiratory:  Positive for cough.    Gastrointestinal:  Negative for abdominal pain, diarrhea, nausea and vomiting.     Patient History    Past Medical History:  She has a past medical history of Asthma, Depression, Fibromyalgia, Hyperlipemia, Insulin resistance, Narcolepsy, Neck pain, PLMD (periodic limb movement disorder), Prolonged emergence from general anesthesia, and Tachycardia.    Social History:  She reports that she has never smoked. She has been exposed to tobacco smoke. She has never used smokeless tobacco. She reports current alcohol use. She reports that she does not use drugs.     Past Surgical History:   Procedure Laterality Date    ANKLE SURGERY Left 05/02/2017    OIO    ANKLE SURGERY Left 12/09/2020    Replacement and tendon repair    CARPAL TUNNEL RELEASE Right 2011    CERVICAL DISCECTOMY  2012    C3-4, C4-5    CERVICAL FUSION N/A 12/18/2019    REMOVAL OF PLATE C4-5, ACDF C3-4, C6-7 WITH PLATING/MEDTRONIC performed by Bebeto Morrow MD at [facility] OR    COLONOSCOPY  2006    CORONARY ANGIOPLASTY WITH STENT PLACEMENT  2/12/15    Dr. Lynn    FACET JOINT INJECTION Right 8/2/2022

## 2025-03-10 NOTE — PROGRESS NOTES
Health Maintenance Due   Topic Date Due    COVID-19 Vaccine (4 - 2024-25 season) 09/01/2024    Annual Wellness Visit (Medicare)  11/02/2024

## 2025-03-10 NOTE — PATIENT INSTRUCTIONS
Thank you   Thank you for trusting us with your healthcare needs. You may receive a survey regarding today's visit. It would help us out if you would take a few moments to provide your feedback. We value your input.  Please bring in ALL medications BOTTLES, including inhalers, herbal supplements, over the counter, prescribed & non-prescribed medicine. The office would like actual medication bottles and a list.         4.  Prior to getting your labs drawn, check with your insurance company for benefits and eligibility of lab services.  Often, insurance companies cover certain tests for preventative visits only.  It is patient's responsibility to    see what is covered.    5.  If the list below has been completed, PLEASE FAX RECORDS TO OUR OFFICE @ [phone]. Once the records have been received we will update your records at our office:  Health Maintenance Due   Topic Date Due    COVID-19 Vaccine (4 - 2024-25 season) 09/01/2024    Annual Wellness Visit (Medicare)  11/02/2024

## 2025-03-10 NOTE — PROGRESS NOTES
S: 69 y.o. female with   Chief Complaint   Patient presents with    Cough     Sinus congestion, bilateral rib pain from coughing, headache, bilateral ear pain and increases when coughing started Saturday morning         Reviewed hx and ROS in detail with resident prior to exam.  See notes for additional details.     BP Readings from Last 3 Encounters:   03/10/25 118/76   02/28/25 128/84   01/06/25 120/80     Wt Readings from Last 3 Encounters:   03/10/25 72.4 kg (159 lb 9.6 oz)   02/28/25 72.8 kg (160 lb 7.9 oz)   01/06/25 72.8 kg (160 lb 6.4 oz)           O: VS:  height is 1.727 m (5' 7.99\") and weight is 72.4 kg (159 lb 9.6 oz). Her oral temperature is 98.8 °F (37.1 °C). Her blood pressure is 118/76 and her pulse is 96. Her respiration is 16 and oxygen saturation is 98%.   AAO/NAD, appropriate affect for mood      Health Maintenance Due   Topic Date Due    COVID-19 Vaccine (4 - 2024-25 season) 09/01/2024    Annual Wellness Visit (Medicare)  11/02/2024         Attending Physician Statement  I have discussed the case, including pertinent history and exam findings with the resident.  I agree with the documented assessment and plan as documented by the resident.  GE modifier added to this encounter      Katelyn Ann Leopold, MD 3/10/2025 12:16 PM

## 2025-04-11 ENCOUNTER — TELEPHONE (OUTPATIENT)
Dept: PULMONOLOGY | Age: 70
End: 2025-04-11

## 2025-04-11 DIAGNOSIS — G47.419 PRIMARY NARCOLEPSY WITHOUT CATAPLEXY: Primary | ICD-10-CM

## 2025-04-11 RX ORDER — ARMODAFINIL 250 MG/1
250 TABLET ORAL DAILY
Qty: 30 TABLET | Refills: 2 | Status: SHIPPED | OUTPATIENT
Start: 2025-04-11 | End: 2025-07-10

## 2025-04-11 NOTE — TELEPHONE ENCOUNTER
PDMP and chart reviewed, pt has been getting filled at [facility], I sent refills to [facility], let me know if that needs changed

## 2025-04-11 NOTE — TELEPHONE ENCOUNTER
Pt called and LM that she needs a refill on Armodafinil 250mg. I did call her back x2 to verify but had to leave a message. Need to find out what pharmacy also please.

## 2025-04-16 ENCOUNTER — OFFICE VISIT (OUTPATIENT)
Dept: FAMILY MEDICINE CLINIC | Age: 70
End: 2025-04-16
Payer: MEDICARE

## 2025-04-16 VITALS
HEIGHT: 68 IN | TEMPERATURE: 97.7 F | RESPIRATION RATE: 12 BRPM | OXYGEN SATURATION: 94 % | DIASTOLIC BLOOD PRESSURE: 84 MMHG | SYSTOLIC BLOOD PRESSURE: 130 MMHG | HEART RATE: 81 BPM | WEIGHT: 161.2 LBS | BODY MASS INDEX: 24.43 KG/M2

## 2025-04-16 DIAGNOSIS — K21.9 GASTROESOPHAGEAL REFLUX DISEASE WITHOUT ESOPHAGITIS: ICD-10-CM

## 2025-04-16 DIAGNOSIS — I10 ESSENTIAL HYPERTENSION: Primary | ICD-10-CM

## 2025-04-16 DIAGNOSIS — R73.03 PRE-DIABETES: ICD-10-CM

## 2025-04-16 DIAGNOSIS — R51.9 RIGHT-SIDED HEADACHE: ICD-10-CM

## 2025-04-16 DIAGNOSIS — E55.9 VITAMIN D DEFICIENCY: ICD-10-CM

## 2025-04-16 DIAGNOSIS — F33.41 RECURRENT MAJOR DEPRESSIVE DISORDER, IN PARTIAL REMISSION: ICD-10-CM

## 2025-04-16 DIAGNOSIS — Z79.899 HIGH RISK MEDICATION USE: ICD-10-CM

## 2025-04-16 DIAGNOSIS — E88.810 METABOLIC SYNDROME: ICD-10-CM

## 2025-04-16 PROCEDURE — 1123F ACP DISCUSS/DSCN MKR DOCD: CPT | Performed by: FAMILY MEDICINE

## 2025-04-16 PROCEDURE — 99214 OFFICE O/P EST MOD 30 MIN: CPT | Performed by: FAMILY MEDICINE

## 2025-04-16 PROCEDURE — 3079F DIAST BP 80-89 MM HG: CPT | Performed by: FAMILY MEDICINE

## 2025-04-16 PROCEDURE — 1159F MED LIST DOCD IN RCRD: CPT | Performed by: FAMILY MEDICINE

## 2025-04-16 PROCEDURE — G8420 CALC BMI NORM PARAMETERS: HCPCS | Performed by: FAMILY MEDICINE

## 2025-04-16 PROCEDURE — 1036F TOBACCO NON-USER: CPT | Performed by: FAMILY MEDICINE

## 2025-04-16 PROCEDURE — G8427 DOCREV CUR MEDS BY ELIG CLIN: HCPCS | Performed by: FAMILY MEDICINE

## 2025-04-16 PROCEDURE — 1090F PRES/ABSN URINE INCON ASSESS: CPT | Performed by: FAMILY MEDICINE

## 2025-04-16 PROCEDURE — 3017F COLORECTAL CA SCREEN DOC REV: CPT | Performed by: FAMILY MEDICINE

## 2025-04-16 PROCEDURE — G8399 PT W/DXA RESULTS DOCUMENT: HCPCS | Performed by: FAMILY MEDICINE

## 2025-04-16 PROCEDURE — 3075F SYST BP GE 130 - 139MM HG: CPT | Performed by: FAMILY MEDICINE

## 2025-04-16 RX ORDER — RIZATRIPTAN BENZOATE 10 MG/1
10 TABLET ORAL
Qty: 9 TABLET | Refills: 3 | Status: CANCELLED | OUTPATIENT
Start: 2025-04-16

## 2025-04-16 RX ORDER — OMEPRAZOLE 40 MG/1
40 CAPSULE, DELAYED RELEASE ORAL DAILY
Qty: 90 CAPSULE | Refills: 0 | Status: SHIPPED | OUTPATIENT
Start: 2025-04-16

## 2025-04-16 RX ORDER — TOPIRAMATE 100 MG/1
100 TABLET, FILM COATED ORAL 2 TIMES DAILY
Qty: 180 TABLET | Refills: 1 | Status: SHIPPED | OUTPATIENT
Start: 2025-04-16

## 2025-04-16 RX ORDER — METFORMIN HYDROCHLORIDE 500 MG/1
1000 TABLET, EXTENDED RELEASE ORAL 2 TIMES DAILY
Qty: 360 TABLET | Refills: 3 | Status: SHIPPED | OUTPATIENT
Start: 2025-04-16

## 2025-04-16 RX ORDER — VENLAFAXINE HYDROCHLORIDE 150 MG/1
CAPSULE, EXTENDED RELEASE ORAL
Qty: 180 CAPSULE | Refills: 1 | Status: SHIPPED | OUTPATIENT
Start: 2025-04-16

## 2025-04-16 RX ORDER — LOSARTAN POTASSIUM 25 MG/1
TABLET ORAL
Qty: 90 TABLET | Refills: 3 | Status: SHIPPED | OUTPATIENT
Start: 2025-04-16

## 2025-04-16 SDOH — ECONOMIC STABILITY: FOOD INSECURITY: WITHIN THE PAST 12 MONTHS, THE FOOD YOU BOUGHT JUST DIDN'T LAST AND YOU DIDN'T HAVE MONEY TO GET MORE.: NEVER TRUE

## 2025-04-16 SDOH — ECONOMIC STABILITY: FOOD INSECURITY: WITHIN THE PAST 12 MONTHS, YOU WORRIED THAT YOUR FOOD WOULD RUN OUT BEFORE YOU GOT MONEY TO BUY MORE.: NEVER TRUE

## 2025-04-16 NOTE — PROGRESS NOTES
[facility] MEDICINE PRACTICE  [address]  [facility] [address]  Dept: [phone]  Dept Fax: [phone]  Loc: [phone]     Provider:  Belem De Luna MD    Patient:  Tisha Fitch  YOB: 1955      Visit Date:  2025     Reason For Visit:   Chief Complaint   Patient presents with    6 Month Follow-Up     Chronic Conditions        Tisha is a 69 y.o. female who comes today to the office for follow up HTN, pre-diabetes, depression. CAD, metabolic syndrome, GERD, migraines.     She states she has been doing well, taking her medications as prescribed.  She denies any side effects from her medications.      HTN: Cozaar 25 mg PO daily, and Atenolol which was prescribed originally for tachycardia and migraines.  BP today 130/84. She follows low sodium diet.    CAD: follows with Dr. Roberts.  Used to see Dr. Lynn.  She is taking Crestor 40 mg daily, Atenolol, ASA and Cozaar.  She has a prescription for NTG, but has not used it in the last year.     GERD: on Omeprazole 40 mg daily.  She avoid the foods that induce her symptoms.  She does not elevated the head of the bed.      She is taking Metformin 500 mg PO BD for pre-diabetes.     Hx of narcolepsy, followed at the pulmonary department.  On Armodafinil 250 mg daily.      For her migraine she is followed at [facility] neurology clinic.  She is getting IV infusion every 3 months of Eptinezumab-jjmr (VYEPTI) 300 mg.     She has pain at the base of the left thumb. She crochets with the right hand.     Hx of vitamin  D deficiency.  Last level was 86.  She is taking 5000 IU daily     Significant Past Medical History:    Past Medical History:   Diagnosis Date    Asthma     Dr. Toribio    Depression       from [facility] neck    Fibromyalgia     Hyperlipemia     check per Dr Lynn     Insulin resistance 2011    Narcolepsy     per Dr. Lynn via sleep study    Neck pain

## 2025-04-16 NOTE — PROGRESS NOTES
Health Maintenance Due   Topic Date Due    COVID-19 Vaccine (4 - 2024-25 season) 09/01/2024    Annual Wellness Visit (Medicare)  11/02/2024    Breast cancer screen  05/02/2025

## 2025-04-16 NOTE — PATIENT INSTRUCTIONS
Thank you   Thank you for trusting us with your healthcare needs. You may receive a survey regarding today's visit. It would help us out if you would take a few moments to provide your feedback. We value your input.  Please bring in ALL medications BOTTLES, including inhalers, herbal supplements, over the counter, prescribed & non-prescribed medicine. The office would like actual medication bottles and a list.         4.  Prior to getting your labs drawn, check with your insurance company for benefits and eligibility of lab services.  Often, insurance companies cover certain tests for preventative visits only.  It is patient's responsibility to    see what is covered.    5.  If the list below has been completed, PLEASE FAX RECORDS TO OUR OFFICE @ [phone]. Once the records have been received we will update your records at our office:  Health Maintenance Due   Topic Date Due    COVID-19 Vaccine (4 - 2024-25 season) 09/01/2024    Annual Wellness Visit (Medicare)  11/02/2024    Breast cancer screen  05/02/2025

## 2025-04-22 ENCOUNTER — RESULTS FOLLOW-UP (OUTPATIENT)
Dept: FAMILY MEDICINE CLINIC | Age: 70
End: 2025-04-22

## 2025-04-22 ENCOUNTER — LAB (OUTPATIENT)
Dept: LAB | Age: 70
End: 2025-04-22

## 2025-04-22 ENCOUNTER — OFFICE VISIT (OUTPATIENT)
Age: 70
End: 2025-04-22
Payer: MEDICARE

## 2025-04-22 VITALS
TEMPERATURE: 98.7 F | DIASTOLIC BLOOD PRESSURE: 82 MMHG | HEIGHT: 68 IN | WEIGHT: 161.6 LBS | SYSTOLIC BLOOD PRESSURE: 112 MMHG | HEART RATE: 83 BPM | BODY MASS INDEX: 24.49 KG/M2 | OXYGEN SATURATION: 96 %

## 2025-04-22 DIAGNOSIS — G47.01 INSOMNIA DUE TO MEDICAL CONDITION: ICD-10-CM

## 2025-04-22 DIAGNOSIS — E88.810 METABOLIC SYNDROME: ICD-10-CM

## 2025-04-22 DIAGNOSIS — R73.03 PRE-DIABETES: ICD-10-CM

## 2025-04-22 DIAGNOSIS — Z79.899 HIGH RISK MEDICATION USE: ICD-10-CM

## 2025-04-22 DIAGNOSIS — E55.9 VITAMIN D DEFICIENCY: ICD-10-CM

## 2025-04-22 DIAGNOSIS — G47.419 PRIMARY NARCOLEPSY WITHOUT CATAPLEXY: Primary | ICD-10-CM

## 2025-04-22 DIAGNOSIS — I10 ESSENTIAL HYPERTENSION: ICD-10-CM

## 2025-04-22 PROBLEM — M50.20 DISPLACEMENT OF CERVICAL INTERVERTEBRAL DISC WITHOUT MYELOPATHY: Status: ACTIVE | Noted: 2025-04-22

## 2025-04-22 PROBLEM — M43.10 ACQUIRED SPONDYLOLISTHESIS: Status: ACTIVE | Noted: 2025-04-22

## 2025-04-22 PROBLEM — G43.719 CHRONIC MIGRAINE WITHOUT AURA, INTRACTABLE, WITHOUT STATUS MIGRAINOSUS: Status: ACTIVE | Noted: 2023-10-17

## 2025-04-22 PROBLEM — Z96.653 STATUS POST BILATERAL KNEE REPLACEMENTS: Status: ACTIVE | Noted: 2025-04-22

## 2025-04-22 PROBLEM — M47.812 CERVICAL SPONDYLOSIS WITHOUT MYELOPATHY: Status: ACTIVE | Noted: 2025-04-22

## 2025-04-22 LAB
25(OH)D3 SERPL-MCNC: 82 NG/ML (ref 30–100)
ALBUMIN SERPL BCG-MCNC: 4.1 G/DL (ref 3.4–4.9)
ALP SERPL-CCNC: 74 U/L (ref 38–126)
ALT SERPL W/O P-5'-P-CCNC: 10 U/L (ref 10–35)
ANION GAP SERPL CALC-SCNC: 11 MEQ/L (ref 8–16)
AST SERPL-CCNC: 16 U/L (ref 10–35)
BACTERIA: ABNORMAL
BASOPHILS ABSOLUTE: 0.1 THOU/MM3 (ref 0–0.1)
BASOPHILS NFR BLD AUTO: 1 %
BILIRUB SERPL-MCNC: < 0.2 MG/DL (ref 0.3–1.2)
BILIRUB UR QL STRIP: NEGATIVE
BUN SERPL-MCNC: 16 MG/DL (ref 8–23)
CALCIUM SERPL-MCNC: 9.6 MG/DL (ref 8.8–10.2)
CASTS #/AREA URNS LPF: ABNORMAL /LPF
CASTS #/AREA URNS LPF: ABNORMAL /LPF
CHARACTER UR: CLEAR
CHARCOAL URNS QL MICRO: ABNORMAL
CHLORIDE SERPL-SCNC: 107 MEQ/L (ref 98–111)
CHOLEST SERPL-MCNC: 143 MG/DL (ref 100–199)
CO2 SERPL-SCNC: 23 MEQ/L (ref 22–29)
COLOR UR: YELLOW
CREAT SERPL-MCNC: 0.9 MG/DL (ref 0.5–0.9)
CREAT UR-MCNC: 71.7 MG/DL
CRYSTALS URNS QL MICRO: ABNORMAL
DEPRECATED MEAN GLUCOSE BLD GHB EST-ACNC: 117 MG/DL (ref 70–126)
DEPRECATED RDW RBC AUTO: 46.7 FL (ref 35–45)
EOSINOPHIL NFR BLD AUTO: 3.4 %
EOSINOPHILS ABSOLUTE: 0.4 THOU/MM3 (ref 0–0.4)
EPITHELIAL CELLS, UA: ABNORMAL /HPF
ERYTHROCYTE [DISTWIDTH] IN BLOOD BY AUTOMATED COUNT: 14.4 % (ref 11.5–14.5)
GFR SERPL CREATININE-BSD FRML MDRD: 69 ML/MIN/1.73M2
GLUCOSE SERPL-MCNC: 107 MG/DL (ref 74–109)
GLUCOSE UR QL STRIP.AUTO: NEGATIVE MG/DL
HBA1C MFR BLD HPLC: 5.9 % (ref 4–6)
HCT VFR BLD AUTO: 40.1 % (ref 37–47)
HDLC SERPL-MCNC: 45 MG/DL
HGB BLD-MCNC: 12.5 GM/DL (ref 12–16)
HGB UR QL STRIP.AUTO: NEGATIVE
IMM GRANULOCYTES # BLD AUTO: 0.05 THOU/MM3 (ref 0–0.07)
IMM GRANULOCYTES NFR BLD AUTO: 0.4 %
KETONES UR QL STRIP.AUTO: NEGATIVE
LDLC SERPL CALC-MCNC: 72 MG/DL
LEUKOCYTE ESTERASE UR QL STRIP.AUTO: NEGATIVE
LYMPHOCYTES ABSOLUTE: 2.8 THOU/MM3 (ref 1–4.8)
LYMPHOCYTES NFR BLD AUTO: 24.7 %
MAGNESIUM SERPL-MCNC: 1.7 MG/DL (ref 1.6–2.6)
MCH RBC QN AUTO: 28 PG (ref 26–33)
MCHC RBC AUTO-ENTMCNC: 31.2 GM/DL (ref 32.2–35.5)
MCV RBC AUTO: 89.7 FL (ref 81–99)
MICROALBUMIN UR-MCNC: 4.37 MG/DL
MICROALBUMIN/CREAT RATIO PNL UR: 61 MG/G (ref 0–30)
MONOCYTES ABSOLUTE: 0.7 THOU/MM3 (ref 0.4–1.3)
MONOCYTES NFR BLD AUTO: 5.8 %
NEUTROPHILS ABSOLUTE: 7.4 THOU/MM3 (ref 1.8–7.7)
NEUTROPHILS NFR BLD AUTO: 64.7 %
NITRITE UR QL STRIP.AUTO: NEGATIVE
NRBC BLD AUTO-RTO: 0 /100 WBC
PH UR STRIP.AUTO: 7.5 [PH] (ref 5–9)
PLATELET # BLD AUTO: 390 THOU/MM3 (ref 130–400)
PMV BLD AUTO: 10.8 FL (ref 9.4–12.4)
POTASSIUM SERPL-SCNC: 3.9 MEQ/L (ref 3.5–5.2)
PROT SERPL-MCNC: 6.4 G/DL (ref 6.4–8.3)
PROT UR STRIP.AUTO-MCNC: ABNORMAL MG/DL
RBC # BLD AUTO: 4.47 MILL/MM3 (ref 4.2–5.4)
RBC #/AREA URNS HPF: ABNORMAL /HPF
RENAL EPI CELLS #/AREA URNS HPF: ABNORMAL /[HPF]
SODIUM SERPL-SCNC: 141 MEQ/L (ref 135–145)
SPECIFIC GRAVITY UA: 1.02 (ref 1–1.03)
TRIGL SERPL-MCNC: 129 MG/DL (ref 0–199)
UROBILINOGEN, URINE: 0.2 EU/DL (ref 0–1)
VIT B12 SERPL-MCNC: 1072 PG/ML (ref 232–1245)
WBC # BLD AUTO: 11.5 THOU/MM3 (ref 4.8–10.8)
WBC #/AREA URNS HPF: ABNORMAL /HPF
YEAST LIKE FUNGI URNS QL MICRO: ABNORMAL

## 2025-04-22 PROCEDURE — 1090F PRES/ABSN URINE INCON ASSESS: CPT | Performed by: NURSE PRACTITIONER

## 2025-04-22 PROCEDURE — 1159F MED LIST DOCD IN RCRD: CPT | Performed by: NURSE PRACTITIONER

## 2025-04-22 PROCEDURE — G8399 PT W/DXA RESULTS DOCUMENT: HCPCS | Performed by: NURSE PRACTITIONER

## 2025-04-22 PROCEDURE — G8420 CALC BMI NORM PARAMETERS: HCPCS | Performed by: NURSE PRACTITIONER

## 2025-04-22 PROCEDURE — 3017F COLORECTAL CA SCREEN DOC REV: CPT | Performed by: NURSE PRACTITIONER

## 2025-04-22 PROCEDURE — 1160F RVW MEDS BY RX/DR IN RCRD: CPT | Performed by: NURSE PRACTITIONER

## 2025-04-22 PROCEDURE — 99214 OFFICE O/P EST MOD 30 MIN: CPT | Performed by: NURSE PRACTITIONER

## 2025-04-22 PROCEDURE — 1036F TOBACCO NON-USER: CPT | Performed by: NURSE PRACTITIONER

## 2025-04-22 PROCEDURE — 1123F ACP DISCUSS/DSCN MKR DOCD: CPT | Performed by: NURSE PRACTITIONER

## 2025-04-22 PROCEDURE — G8427 DOCREV CUR MEDS BY ELIG CLIN: HCPCS | Performed by: NURSE PRACTITIONER

## 2025-04-22 RX ORDER — METHYLPHENIDATE HYDROCHLORIDE 10 MG/1
10 TABLET ORAL 2 TIMES DAILY
Qty: 60 TABLET | Refills: 0 | Status: SHIPPED | OUTPATIENT
Start: 2025-04-22 | End: 2025-05-22

## 2025-04-22 RX ORDER — ZOLPIDEM TARTRATE 5 MG
5 TABLET ORAL NIGHTLY
Qty: 30 TABLET | Refills: 0 | Status: SHIPPED | OUTPATIENT
Start: 2025-04-22 | End: 2025-05-22

## 2025-04-22 RX ORDER — METHYLPHENIDATE HYDROCHLORIDE 10 MG/1
10 TABLET ORAL 2 TIMES DAILY
COMMUNITY
End: 2025-04-22 | Stop reason: SDUPTHER

## 2025-04-22 NOTE — PROGRESS NOTES
[facility] Medicine and Sleep Medicine    : TISHA DODDDENNIS, 69 y.o.   : 1955    Pt of Dr. Sanchez     Subjective     Chief Complaint   Patient presents with    Follow-up     8 month narcolepsy follow up/medication check.         HPI  Tisha is here for follow up for narcolepsy without cataplexy  Former pt of Mary Thayer PA-C .   Copied from last visit    Since the last visit Tisha has been doing reasonably well with Nuvigil and Ritalin.  She can not afford Sunosi or Wakix.  She is more tired and asking for Nuvigil to be increased. She uses Ritalin sparingly.  It does contribute to insomnia       Neck Circumference -   13 inches  Mallampati - 3    SAQLI 54  ESS 17 ( Stable from last year)   History of Present Illness  The patient presents for evaluation of narcolepsy.    She reports a consistent level of daytime sleepiness with her current medication regimen, which includes Nuvigil and Ritalin.   She does not take Ritalin daily but uses it as needed when Nuvigil alone is insufficient.   She has been experiencing intermittent periods of insomnia, characterized by difficulty initiating sleep until approximately 3 AM, followed by fragmented sleep with awakenings every hour.   These episodes are interspersed with periods of satisfactory sleep.   She has not identified any specific stressors or life events that could be contributing to these sleep disturbances.   She has not previously used Ambien or any other sleep aids.   She has not experienced any symptoms suggestive of cataplexy, such as abnormal muscle twitching or weakness associated with emotional changes.   She has tried Sunosi and Wakix, but there were some cost issues.  She has tried Xyrem in past but did not tolerate the salt taste and caused nausea    SOCIAL HISTORY  She is retired and used to work as a nurse.    MEDICATIONS  Current: Nuvigil, Ritalin      Past Medical hx   PMH:  Past Medical History:   Diagnosis

## 2025-04-23 ENCOUNTER — OFFICE VISIT (OUTPATIENT)
Dept: FAMILY MEDICINE CLINIC | Age: 70
End: 2025-04-23
Payer: MEDICARE

## 2025-04-23 VITALS
WEIGHT: 160.2 LBS | HEART RATE: 85 BPM | TEMPERATURE: 98.7 F | DIASTOLIC BLOOD PRESSURE: 82 MMHG | SYSTOLIC BLOOD PRESSURE: 126 MMHG | OXYGEN SATURATION: 98 % | HEIGHT: 68 IN | RESPIRATION RATE: 16 BRPM | BODY MASS INDEX: 24.28 KG/M2

## 2025-04-23 DIAGNOSIS — R73.03 PRE-DIABETES: ICD-10-CM

## 2025-04-23 DIAGNOSIS — R80.9 MICROALBUMINURIA: ICD-10-CM

## 2025-04-23 DIAGNOSIS — Z12.31 ENCOUNTER FOR SCREENING MAMMOGRAM FOR BREAST CANCER: ICD-10-CM

## 2025-04-23 DIAGNOSIS — H91.93 DECREASED HEARING OF BOTH EARS: ICD-10-CM

## 2025-04-23 DIAGNOSIS — R22.1 MASS OF RIGHT SIDE OF NECK: ICD-10-CM

## 2025-04-23 DIAGNOSIS — Z00.00 MEDICARE ANNUAL WELLNESS VISIT, SUBSEQUENT: Primary | ICD-10-CM

## 2025-04-23 PROCEDURE — 1159F MED LIST DOCD IN RCRD: CPT | Performed by: FAMILY MEDICINE

## 2025-04-23 PROCEDURE — G0439 PPPS, SUBSEQ VISIT: HCPCS | Performed by: FAMILY MEDICINE

## 2025-04-23 PROCEDURE — 3017F COLORECTAL CA SCREEN DOC REV: CPT | Performed by: FAMILY MEDICINE

## 2025-04-23 PROCEDURE — 1123F ACP DISCUSS/DSCN MKR DOCD: CPT | Performed by: FAMILY MEDICINE

## 2025-04-23 PROCEDURE — 1160F RVW MEDS BY RX/DR IN RCRD: CPT | Performed by: FAMILY MEDICINE

## 2025-04-23 RX ORDER — DAPAGLIFLOZIN 5 MG/1
5 TABLET, FILM COATED ORAL EVERY MORNING
Qty: 90 TABLET | Refills: 3 | Status: SHIPPED | OUTPATIENT
Start: 2025-04-23

## 2025-04-23 ASSESSMENT — PATIENT HEALTH QUESTIONNAIRE - PHQ9
1. LITTLE INTEREST OR PLEASURE IN DOING THINGS: NOT AT ALL
9. THOUGHTS THAT YOU WOULD BE BETTER OFF DEAD, OR OF HURTING YOURSELF: NOT AT ALL
SUM OF ALL RESPONSES TO PHQ QUESTIONS 1-9: 2
4. FEELING TIRED OR HAVING LITTLE ENERGY: SEVERAL DAYS
SUM OF ALL RESPONSES TO PHQ QUESTIONS 1-9: 2
8. MOVING OR SPEAKING SO SLOWLY THAT OTHER PEOPLE COULD HAVE NOTICED. OR THE OPPOSITE, BEING SO FIGETY OR RESTLESS THAT YOU HAVE BEEN MOVING AROUND A LOT MORE THAN USUAL: NOT AT ALL
6. FEELING BAD ABOUT YOURSELF - OR THAT YOU ARE A FAILURE OR HAVE LET YOURSELF OR YOUR FAMILY DOWN: NOT AT ALL
7. TROUBLE CONCENTRATING ON THINGS, SUCH AS READING THE NEWSPAPER OR WATCHING TELEVISION: NOT AT ALL
5. POOR APPETITE OR OVEREATING: NOT AT ALL
SUM OF ALL RESPONSES TO PHQ QUESTIONS 1-9: 2
10. IF YOU CHECKED OFF ANY PROBLEMS, HOW DIFFICULT HAVE THESE PROBLEMS MADE IT FOR YOU TO DO YOUR WORK, TAKE CARE OF THINGS AT HOME, OR GET ALONG WITH OTHER PEOPLE: NOT DIFFICULT AT ALL
2. FEELING DOWN, DEPRESSED OR HOPELESS: NOT AT ALL
3. TROUBLE FALLING OR STAYING ASLEEP: SEVERAL DAYS
SUM OF ALL RESPONSES TO PHQ QUESTIONS 1-9: 2

## 2025-04-23 NOTE — PATIENT INSTRUCTIONS
eyesight can increase your risk of falling. So can some medicines. But there are things you can do to help prevent falls. You can exercise to get stronger. You can also arrange your home to make it safer.    Talk to your doctor about the medicines you take. Ask if any of them increase the risk of falls and whether they can be changed or stopped.   Try to exercise regularly. It can help improve your strength and balance. This can help lower your risk of falling.         Practice fall safety and prevention.   Wear low-heeled shoes that fit well and give your feet good support. Talk to your doctor if you have foot problems that make this hard.  Carry a cellphone or wear a medical alert device that you can use to call for help.  Use stepladders instead of chairs to reach high objects. Don't climb if you're at risk for falls. Ask for help, if needed.  Wear the correct eyeglasses, if you need them.        Make your home safer.   Remove rugs, cords, clutter, and furniture from walkways.  Keep your house well lit. Use night-lights in hallways and bathrooms.  Install and use sturdy handrails on stairways.  Wear nonskid footwear, even inside. Don't walk barefoot or in socks without shoes.        Be safe outside.   Use handrails, curb cuts, and ramps whenever possible.  Keep your hands free by using a shoulder bag or backpack.  Try to walk in well-lit areas. Watch out for uneven ground, changes in pavement, and debris.  Be careful in the winter. Walk on the grass or gravel when sidewalks are slippery. Use de-icer on steps and walkways. Add non-slip devices to shoes.    Put grab bars and nonskid mats in your shower or tub and near the toilet. Try to use a shower chair or bath bench when bathing.   Get into a tub or shower by putting in your weaker leg first. Get out with your strong side first. Have a phone or medical alert device in the bathroom with you.   Where can you learn more?  Go to https://www.healthwise.net/patientEd

## 2025-04-25 ENCOUNTER — TELEPHONE (OUTPATIENT)
Dept: PULMONOLOGY | Age: 70
End: 2025-04-25

## 2025-04-25 DIAGNOSIS — G47.01 INSOMNIA DUE TO MEDICAL CONDITION: ICD-10-CM

## 2025-04-25 NOTE — TELEPHONE ENCOUNTER
Patient called in stating that her Ambien was called in as JARAD and now it will cost $700.. are you able to send it in so the pharmacy can give her the generic brand? Thank you

## 2025-04-28 RX ORDER — ZOLPIDEM TARTRATE 5 MG/1
5 TABLET ORAL NIGHTLY
Qty: 30 TABLET | Refills: 0 | Status: SHIPPED | OUTPATIENT
Start: 2025-04-28 | End: 2025-05-28

## 2025-05-02 ENCOUNTER — HOSPITAL ENCOUNTER (OUTPATIENT)
Dept: WOMENS IMAGING | Age: 70
Discharge: HOME | End: 2025-05-02
Attending: FAMILY MEDICINE
Payer: MEDICARE

## 2025-05-02 DIAGNOSIS — Z12.31 ENCOUNTER FOR SCREENING MAMMOGRAM FOR BREAST CANCER: ICD-10-CM

## 2025-05-02 PROCEDURE — 77063 BREAST TOMOSYNTHESIS BI: CPT

## 2025-05-15 ENCOUNTER — PATIENT MESSAGE (OUTPATIENT)
Dept: FAMILY MEDICINE CLINIC | Age: 70
End: 2025-05-15

## 2025-05-17 ENCOUNTER — HOSPITAL ENCOUNTER (OUTPATIENT)
Dept: CT IMAGING | Age: 70
Discharge: HOME | End: 2025-05-17
Attending: FAMILY MEDICINE
Payer: MEDICARE

## 2025-05-17 DIAGNOSIS — R22.1 MASS OF RIGHT SIDE OF NECK: ICD-10-CM

## 2025-05-17 PROCEDURE — 6360000004 HC RX CONTRAST MEDICATION: Performed by: FAMILY MEDICINE

## 2025-05-17 PROCEDURE — 70491 CT SOFT TISSUE NECK W/DYE: CPT

## 2025-05-17 RX ORDER — IOPAMIDOL 755 MG/ML
75 INJECTION, SOLUTION INTRAVASCULAR
Status: COMPLETED | OUTPATIENT
Start: 2025-05-17 | End: 2025-05-17

## 2025-05-17 RX ADMIN — IOPAMIDOL 75 ML: 755 INJECTION, SOLUTION INTRAVENOUS at 10:46

## 2025-05-27 ENCOUNTER — RESULTS FOLLOW-UP (OUTPATIENT)
Dept: FAMILY MEDICINE CLINIC | Age: 70
End: 2025-05-27

## 2025-05-28 ENCOUNTER — OFFICE VISIT (OUTPATIENT)
Dept: PHYSICAL MEDICINE AND REHAB | Age: 70
End: 2025-05-28
Payer: MEDICARE

## 2025-05-28 VITALS
SYSTOLIC BLOOD PRESSURE: 98 MMHG | BODY MASS INDEX: 24.25 KG/M2 | HEIGHT: 68 IN | HEART RATE: 83 BPM | DIASTOLIC BLOOD PRESSURE: 62 MMHG | WEIGHT: 160 LBS

## 2025-05-28 DIAGNOSIS — G89.29 OTHER CHRONIC PAIN: ICD-10-CM

## 2025-05-28 DIAGNOSIS — M47.812 CERVICAL SPONDYLOSIS: ICD-10-CM

## 2025-05-28 DIAGNOSIS — G43.E09 CHRONIC MIGRAINE WITH AURA WITHOUT STATUS MIGRAINOSUS, NOT INTRACTABLE: Primary | ICD-10-CM

## 2025-05-28 DIAGNOSIS — M79.18 MYOFASCIAL PAIN: ICD-10-CM

## 2025-05-28 PROCEDURE — G8427 DOCREV CUR MEDS BY ELIG CLIN: HCPCS | Performed by: ANESTHESIOLOGY

## 2025-05-28 PROCEDURE — 1159F MED LIST DOCD IN RCRD: CPT | Performed by: ANESTHESIOLOGY

## 2025-05-28 PROCEDURE — 99213 OFFICE O/P EST LOW 20 MIN: CPT | Performed by: ANESTHESIOLOGY

## 2025-05-28 PROCEDURE — 1123F ACP DISCUSS/DSCN MKR DOCD: CPT | Performed by: ANESTHESIOLOGY

## 2025-05-28 PROCEDURE — 3017F COLORECTAL CA SCREEN DOC REV: CPT | Performed by: ANESTHESIOLOGY

## 2025-05-28 PROCEDURE — G8420 CALC BMI NORM PARAMETERS: HCPCS | Performed by: ANESTHESIOLOGY

## 2025-05-28 PROCEDURE — 1036F TOBACCO NON-USER: CPT | Performed by: ANESTHESIOLOGY

## 2025-05-28 PROCEDURE — 1090F PRES/ABSN URINE INCON ASSESS: CPT | Performed by: ANESTHESIOLOGY

## 2025-05-28 PROCEDURE — G8399 PT W/DXA RESULTS DOCUMENT: HCPCS | Performed by: ANESTHESIOLOGY

## 2025-05-28 PROCEDURE — 1125F AMNT PAIN NOTED PAIN PRSNT: CPT | Performed by: ANESTHESIOLOGY

## 2025-05-28 PROCEDURE — 64615 CHEMODENERV MUSC MIGRAINE: CPT | Performed by: ANESTHESIOLOGY

## 2025-05-28 NOTE — PROGRESS NOTES
Functionality Assessment/Goals Worksheet     On a scale of 0 (Does not Interfere) to 10 (Completely Interferes)     1.  Which number describes how during the past week pain has interfered with           the following:  A.  General Activity:  3  B.  Mood: 3  C.  Walking Ability:  0  D.  Normal Work (Includes both work outside the home and housework):  4  E.  Relations with Other People:   1  F.  Sleep:   2  G.  Enjoyment of Life:   2    2.  Patient Prefers to Take their Pain Medications:     []  On a regular basis   [x]  Only when necessary    []  Does not take pain medications    3.  What are the Patient's Goals/Expectations for Visiting Pain Management?     []  Learn about my pain    []  Receive Medication   []  Physical Therapy     []  Treat Depression   [x]  Receive Injections    []  Treat Sleep   []  Deal with Anxiety and Stress   []  Treat Opoid Dependence/Addiction   []  Other:

## 2025-05-28 NOTE — PROGRESS NOTES
Chronic Pain/PM&R Clinic Note     Encounter Date: 5/28/25    Subjective:   Chief Complaint:   Chief Complaint   Patient presents with    Botox Injection     200 units migraine        History of Present Illness:   Tisha Fitch is a 69 y.o. female seen in the clinic initially on 05/02/22 upon request from Belem De Luna MD (PCP) for her history of chronic neck pain and migraines.  She has a medical history of 2 previous cervical neck surgeries including a C3/C4 and C7/T1 anterior cervical disc fusions.  She has been dealing with migraines ever since she was a teenager.  She states that she has been noticing worsening pain on the right side of her neck over the last few months without any inciting event.  She states this pain is a constant 5/10 shooting, throbbing, stabbing pain.  She states the pain will start at the base of her occiput on the right-hand side and radiate around to the top of her scalp around to her right eye and to her ear.  She states that this can be worse with mastication but denies any associated blurry vision, double vision, ringing in the ears, or issues with photophobia or phonophobia.  She states that she does have some very intermittent numbness/tingling in her left hand.  She denies any worsening balance/gait disturbances.    She states that she has been managing her pain with the use of Excedrin, Maxalt, Topamax, baclofen.  She states she was close to having Botox done for her migraines but this was never pursued when she had to have her second surgery.    Today, 5/28/2025, patient presents for planned follow-up for management of ongoing neck pain and migraine headaches.  She does respond to her Botox injections but did have a lot of breakthrough migraines on the right side of her head.  She would like to repeat her Botox injection at this office visit.  She denies any change presentation her migraine headaches medication use treat her migraines.    History of Interventions:   Surgery:

## 2025-05-28 NOTE — PROGRESS NOTES
Pre-operative Diagnosis: Chronic Migraine Headaches     Post-operative Diagnosis: Chronic Migraine Headaches     Procedure: Botox for migraine headaches     Procedure Description:  Patient was reclined on the examination table. Botox was drawn up into a tuberculin syringes, to a concentration of 5 units per 0.1 mL with a 30-gauge needle. Skin was prepped with alcohol wipes. The following muscles were injected after negative aspiration; The frontalis muscle bilaterally a total of 4 sites (20 units), The  muscle bilaterally a total of 2 sites (10 units), the procerus one site (5 units), the occipitalis bilaterally a total of 6 sites (30 units), The temporalis muscle bilaterally a total of 8 sites (40 units), the massetter muscles bilaterally (20 units), the trapezius bilaterally a total of 6 sites (30 units), and cervical paraspinal muscle groups a total of (45 units). This was a total of 200 units. The patient tolerated the procedure well.    Medications: 4 mL in 200 U Botox drawn up in 4 separate 1 mL TB syringes = 5 U per 0.1 mL syringe    Amount medication wasted: 0 units        Procedural Complications: None        Frankie Garber DO  Interventional Pain Management/PM&R   [facility]

## 2025-05-28 NOTE — PROGRESS NOTES
Functionality Assessment/Goals Worksheet     On a scale of 0 (Does not Interfere) to 10 (Completely Interferes)       2.  Patient Prefers to Take their Pain Medications:     []  On a regular basis   []  Only when necessary    []  Does not take pain medications    3.  What are the Patient's Goals/Expectations for Visiting Pain Management?     []  Learn about my pain    []  Receive Medication   []  Physical Therapy     []  Treat Depression   []  Receive Injections    []  Treat Sleep   []  Deal with Anxiety and Stress   []  Treat Opoid Dependence/Addiction   []  Other:

## 2025-06-03 DIAGNOSIS — J45.30 MILD PERSISTENT ASTHMA WITHOUT COMPLICATION: ICD-10-CM

## 2025-06-03 RX ORDER — MONTELUKAST SODIUM 10 MG/1
10 TABLET ORAL NIGHTLY
Qty: 30 TABLET | Refills: 11 | Status: SHIPPED | OUTPATIENT
Start: 2025-06-03

## 2025-06-03 NOTE — TELEPHONE ENCOUNTER
Received refill request for montelukast (SINGULAIR) 10 MG tablet.   Medication was last ordered by Kelley Callahan.   Medication was last ordered on 4/30/24 with 11 refills.     Patient was last seen in the office 10/16/24.   Does patient have a scheduled follow up?: yes - 10/20/25    Medication needs to be sent to   [facility]  [facility] - [place], [place] - [address] - P [phone] - F [phone]  [address] [place] [address]  Phone: [phone]  Fax: [phone]        Thank you, please advise!    Patient's Allergies:  Allergies   Allergen Reactions    Bactrim Other (See Comments)     Metallic taste    Iv Dye [Iodides] Nausea Only and Other (See Comments)     Says older IVP dye causes Stomach pains    Tolectin [Tolmetin Sodium] Other (See Comments)     Metallic taste    Claforan [Cefotaxime Sodium In D5w] Hives and Rash    Pcn [Penicillins] Hives and Rash

## 2025-07-07 DIAGNOSIS — K21.9 GASTROESOPHAGEAL REFLUX DISEASE WITHOUT ESOPHAGITIS: ICD-10-CM

## 2025-07-07 NOTE — TELEPHONE ENCOUNTER
Patient's last appointment was: 4/23/2025  with our   Patient's next appointment is: 10/22/2025  with our Dr. Alamo  Last refilled on: 4/16/2025  Which pharmacy does the script need sent to: [facility]      Lab Results   Component Value Date    LABA1C 5.9 04/22/2025     Lab Results   Component Value Date    CHOL 143 04/22/2025    TRIG 129 04/22/2025    HDL 45 04/22/2025     Lab Results   Component Value Date     04/22/2025    K 3.9 04/22/2025     04/22/2025    CO2 23 04/22/2025    BUN 16 04/22/2025    CREATININE 0.9 04/22/2025    GLUCOSE 107 04/22/2025    CALCIUM 9.6 04/22/2025    BILITOT <0.2 (L) 04/22/2025    ALKPHOS 74 04/22/2025    AST 16 04/22/2025    ALT 10 04/22/2025    LABGLOM 69 04/22/2025     Lab Results   Component Value Date    TSH 0.553 08/12/2024    T4FREE 1.10 04/21/2023     Lab Results   Component Value Date    WBC 11.5 (H) 04/22/2025    HGB 12.5 04/22/2025    HCT 40.1 04/22/2025    MCV 89.7 04/22/2025     04/22/2025

## 2025-07-08 RX ORDER — OMEPRAZOLE 40 MG/1
40 CAPSULE, DELAYED RELEASE ORAL DAILY
Qty: 90 CAPSULE | Refills: 0 | Status: SHIPPED | OUTPATIENT
Start: 2025-07-08

## 2025-07-15 ENCOUNTER — OFFICE VISIT (OUTPATIENT)
Age: 70
End: 2025-07-15
Payer: MEDICARE

## 2025-07-15 VITALS
BODY MASS INDEX: 23.82 KG/M2 | HEIGHT: 68 IN | OXYGEN SATURATION: 99 % | TEMPERATURE: 97.8 F | SYSTOLIC BLOOD PRESSURE: 116 MMHG | HEART RATE: 85 BPM | DIASTOLIC BLOOD PRESSURE: 78 MMHG | WEIGHT: 157.2 LBS

## 2025-07-15 DIAGNOSIS — R11.0 CHRONIC NAUSEA: ICD-10-CM

## 2025-07-15 DIAGNOSIS — G47.419 NARCOLEPSY WITHOUT CATAPLEXY: Primary | ICD-10-CM

## 2025-07-15 PROCEDURE — G8427 DOCREV CUR MEDS BY ELIG CLIN: HCPCS | Performed by: NURSE PRACTITIONER

## 2025-07-15 PROCEDURE — 1159F MED LIST DOCD IN RCRD: CPT | Performed by: NURSE PRACTITIONER

## 2025-07-15 PROCEDURE — 3017F COLORECTAL CA SCREEN DOC REV: CPT | Performed by: NURSE PRACTITIONER

## 2025-07-15 PROCEDURE — G8420 CALC BMI NORM PARAMETERS: HCPCS | Performed by: NURSE PRACTITIONER

## 2025-07-15 PROCEDURE — 99214 OFFICE O/P EST MOD 30 MIN: CPT | Performed by: NURSE PRACTITIONER

## 2025-07-15 PROCEDURE — 1160F RVW MEDS BY RX/DR IN RCRD: CPT | Performed by: NURSE PRACTITIONER

## 2025-07-15 PROCEDURE — 1036F TOBACCO NON-USER: CPT | Performed by: NURSE PRACTITIONER

## 2025-07-15 PROCEDURE — 1090F PRES/ABSN URINE INCON ASSESS: CPT | Performed by: NURSE PRACTITIONER

## 2025-07-15 PROCEDURE — G8399 PT W/DXA RESULTS DOCUMENT: HCPCS | Performed by: NURSE PRACTITIONER

## 2025-07-15 PROCEDURE — 1123F ACP DISCUSS/DSCN MKR DOCD: CPT | Performed by: NURSE PRACTITIONER

## 2025-07-15 RX ORDER — ARMODAFINIL 250 MG/1
250 TABLET ORAL DAILY
Qty: 30 TABLET | Refills: 5 | Status: SHIPPED | OUTPATIENT
Start: 2025-07-15 | End: 2026-01-11

## 2025-07-15 RX ORDER — METHYLPHENIDATE HYDROCHLORIDE 10 MG/1
10 TABLET ORAL 2 TIMES DAILY
Qty: 60 TABLET | Refills: 0 | Status: SHIPPED | OUTPATIENT
Start: 2025-07-15 | End: 2025-08-14

## 2025-07-15 RX ORDER — ONDANSETRON 4 MG/1
4 TABLET, ORALLY DISINTEGRATING ORAL 3 TIMES DAILY PRN
Qty: 21 TABLET | Refills: 0 | Status: SHIPPED | OUTPATIENT
Start: 2025-07-15

## 2025-07-15 NOTE — PROGRESS NOTES
[facility]    : AYDEN DODDOliverDENNIS, 70 y.o.   : 1955  7/15/2025    Pt of Dr. Sanchez     Subjective     Chief Complaint   Patient presents with    Follow-up     2 month narcolepsy follow up         Neck Circumference -   13  Mallampati - 3  ESS 17 ( stable from last visit)   SAQLI 50      Results    '  Presentation:   History of Present Illness  The patient is a 70-year-old female who presents for a 2-month narcolepsy follow-up. She was previously on Ambien 5 mg tablet but stopped taking it due to feeling too groggy in the mornings. She continues to take Nuvigil every morning and Ritalin 10 mg twice a day as needed. She has tried and failed on sodium oxybate in the past due to side effects.    Narcolepsy and Sleep Issues  She reports that after taking Ambien 5 mg, she slept for two days. She experiences difficulty both falling asleep and maintaining sleep. Her sleepiness scores are high, indicating persistent daytime sleepiness. She occasionally takes Nuvigil in the morning and Ritalin in the afternoon around 3 or 4 PM, depending on her activities. She tries to avoid taking Ritalin too late as it may interfere with her sleep. She finds Nuvigil beneficial in helping her stay awake, although she still takes naps. She does not experience any adverse effects from Nuvigil. She has previously tried Xyrem but discontinued it due to nausea.    Nausea and Vomiting  She also mentions occasional bouts of nausea upon waking up, which sometimes lead to vomiting. She plans to consult her gastroenterologist for a colonoscopy. She takes most of her medications at night as they tend to cause morning sickness if taken in the morning. She takes Farxiga at night instead of the recommended morning dose due to its diuretic effect. 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 Even vitamins taken

## 2025-07-16 ENCOUNTER — TELEPHONE (OUTPATIENT)
Age: 70
End: 2025-07-16

## 2025-07-16 DIAGNOSIS — G47.419 NARCOLEPSY WITHOUT CATAPLEXY: Primary | ICD-10-CM

## 2025-07-16 NOTE — TELEPHONE ENCOUNTER
Submitted prior authorization for Lumryz (starter pack) today.   -  Patient has tried and failed : Xyrem (nausea), Ambien (groggy in morning)  -  I will update this encounter once a determination has been received.  -  CoverMyMeds Key: [id number]

## 2025-07-22 RX ORDER — SODIUM OXYBATE 4.5-6-7.5G
KIT ORAL
Qty: 30 EACH | Refills: 3 | Status: SHIPPED | OUTPATIENT
Start: 2025-07-22 | End: 2025-11-03

## 2025-07-22 RX ORDER — SODIUM OXYBATE 4.5-6-7.5G
KIT ORAL
COMMUNITY
End: 2025-07-22 | Stop reason: SDUPTHER

## 2025-07-22 NOTE — TELEPHONE ENCOUNTER
I received benefit investigation report from [facility] support services with Lumryz (see media). They are stating that patient's insurance-preferred pharmacy is [facility]. I have loaded the pharmacy to this encounter. Please send Lumryz to pharmacy, thanks!

## 2025-07-24 ENCOUNTER — TELEPHONE (OUTPATIENT)
Age: 70
End: 2025-07-24

## 2025-07-28 ENCOUNTER — TELEPHONE (OUTPATIENT)
Age: 70
End: 2025-07-28

## 2025-07-28 NOTE — TELEPHONE ENCOUNTER
See my original encounter on 07/16/2025  
Sury called from [facility] again to see if a PA was submitted. I told her I would send a message. Her #  is [phone]  
Smoking even a single puff increases the likelihood of a full relapse, withdrawal symptoms peak within 1-2 weeks, but can persist for months

## 2025-08-08 DIAGNOSIS — J44.89 ASTHMA-COPD OVERLAP SYNDROME (HCC): ICD-10-CM

## 2025-08-11 RX ORDER — BUDESONIDE AND FORMOTEROL FUMARATE 160; 4.5 UG/1; UG/1
2 AEROSOL, METERED RESPIRATORY (INHALATION) 2 TIMES DAILY
Qty: 33 G | Refills: 3 | Status: SHIPPED | OUTPATIENT
Start: 2025-08-11

## 2025-08-22 ENCOUNTER — OFFICE VISIT (OUTPATIENT)
Dept: PHYSICAL MEDICINE AND REHAB | Age: 70
End: 2025-08-22

## 2025-08-22 VITALS
SYSTOLIC BLOOD PRESSURE: 126 MMHG | HEIGHT: 68 IN | WEIGHT: 157 LBS | BODY MASS INDEX: 23.79 KG/M2 | DIASTOLIC BLOOD PRESSURE: 78 MMHG

## 2025-08-22 DIAGNOSIS — M79.18 MYOFASCIAL PAIN: ICD-10-CM

## 2025-08-22 DIAGNOSIS — G89.29 OTHER CHRONIC PAIN: ICD-10-CM

## 2025-08-22 DIAGNOSIS — G47.419 NARCOLEPSY WITHOUT CATAPLEXY: Primary | ICD-10-CM

## 2025-08-22 DIAGNOSIS — M47.812 CERVICAL SPONDYLOSIS: ICD-10-CM

## 2025-08-22 DIAGNOSIS — G43.E09 CHRONIC MIGRAINE WITH AURA WITHOUT STATUS MIGRAINOSUS, NOT INTRACTABLE: Primary | ICD-10-CM

## 2025-08-22 RX ORDER — SODIUM OXYBATE 4.5 G/1
4.5 FOR SUSPENSION ORAL NIGHTLY
Qty: 7 EACH | Refills: 0 | Status: SHIPPED | OUTPATIENT
Start: 2025-08-22

## 2025-08-22 RX ORDER — SODIUM OXYBATE 6 G/1
6 FOR SUSPENSION ORAL NIGHTLY
Qty: 14 EACH | Refills: 0 | Status: SHIPPED | OUTPATIENT
Start: 2025-09-22 | End: 2025-10-06

## 2025-08-25 ENCOUNTER — TELEPHONE (OUTPATIENT)
Age: 70
End: 2025-08-25

## (undated) DEVICE — 1840 FOAM BLOCK NEEDLE COUNTER: Brand: DEVON

## (undated) DEVICE — SOLUTION IV 1000ML 0.9% SOD CHL PH 5 INJ USP VIAFLX PLAS

## (undated) DEVICE — NEEDLE HYPO 18GA L1.5IN THN WALL PIVOTING SHLD BVL ORIENTED

## (undated) DEVICE — 6 ML SYRINGE LUER-LOCK TIP: Brand: MONOJECT

## (undated) DEVICE — NEEDLE SPNL 22GA L3.5IN BLK HUB S STL REG WALL FIT STYL W/

## (undated) DEVICE — GAUZE SPONGES,USP TYPE VII GAUZE, 12 PLY: Brand: CURITY

## (undated) DEVICE — TAPE,CLOTH/SILK,CURAD,3"X10YD,LF,40/CS: Brand: CURAD

## (undated) DEVICE — SUTURE VCRL SZ 3-0 L18IN ABSRB VLT SUTUPAK PRECUT W/O NDL J104T

## (undated) DEVICE — TUBING, SUCTION, 1/4" X 20', STRAIGHT: Brand: MEDLINE INDUSTRIES, INC.

## (undated) DEVICE — PATIENT RETURN ELECTRODE, SINGLE-USE, CONTACT QUALITY MONITORING, ADULT, WITH 9FT CORD, FOR PATIENTS WEIGING OVER 33LBS. (15KG): Brand: MEGADYNE

## (undated) DEVICE — HYPODERMIC SAFETY NEEDLE: Brand: MAGELLAN

## (undated) DEVICE — GLOVE ORANGE PI 8 1/2   MSG9085

## (undated) DEVICE — TOWEL,OR,DSP,ST,BLUE,STD,4/PK,20PK/CS: Brand: MEDLINE

## (undated) DEVICE — BLADE ES ELASTOMERIC COAT INSUL DURABLE BEND UPTO 90DEG

## (undated) DEVICE — BLANKET WRM W29.9XL79.1IN UP BODY FORC AIR MISTRAL-AIR

## (undated) DEVICE — GLOVE BIOGEL POWDER FREE SZ 8

## (undated) DEVICE — SYRINGE MED 10ML LUERLOCK TIP W/O SFTY DISP

## (undated) DEVICE — DRESSING TRNSPAR W5XL4.5IN FLM SHT SEMIPERMEABLE WIND

## (undated) DEVICE — MARKER,SKIN,WI/RULER AND LABELS: Brand: MEDLINE

## (undated) DEVICE — DRAPE MICSCP W132XL406CM LENS DIA68MM W VARI LENS2 FOR LEICA

## (undated) DEVICE — SUTURE VCRL SZ 1 L27IN ABSRB UD CT-1 L36MM 1/2 CIR J261H

## (undated) DEVICE — COVER,LIGHT HANDLE,FLX,2/PK: Brand: MEDLINE INDUSTRIES, INC.

## (undated) DEVICE — COLLAR CERV UNIV AD L13-19IN TRACH OPN TWO PC RIG POLYETH

## (undated) DEVICE — GOWN,SIRUS,NON REINFRCD,LARGE,SET IN SL: Brand: MEDLINE

## (undated) DEVICE — FLOSEAL HEMOSTATIC MATRIX, 5 ML: Brand: FLOSEAL

## (undated) DEVICE — STRIP,CLOSURE,WOUND,MEDI-STRIP,1/2X4: Brand: MEDLINE

## (undated) DEVICE — INTENDED FOR TISSUE SEPARATION, AND OTHER PROCEDURES THAT REQUIRE A SHARP SURGICAL BLADE TO PUNCTURE OR CUT.: Brand: BARD-PARKER ® CARBON RIB-BACK BLADES

## (undated) DEVICE — GLOVE ORANGE PI 7 1/2   MSG9075

## (undated) DEVICE — GLOVE ORANGE PI 7   MSG9070

## (undated) DEVICE — BLADE LARYNSCP SZ 3 ENH DIR INTUB GLIDESCOPE MCGRATH MAC

## (undated) DEVICE — 1010 S-DRAPE TOWEL DRAPE 10/BX: Brand: STERI-DRAPE™

## (undated) DEVICE — SYRINGE MED 3ML CLR PLAS STD N CTRL LUERLOCK TIP DISP

## (undated) DEVICE — SUTURE VCRL SZ 3-0 L27IN ABSRB UD FS-2 L19MM 1/2 CIR J423H

## (undated) DEVICE — 3M™ STERI-STRIP™ COMPOUND BENZOIN TINCTURE 40 BAGS/CARTON 4 CARTONS/CASE C1544: Brand: 3M™ STERI-STRIP™

## (undated) DEVICE — Device: Brand: FABCO

## (undated) DEVICE — DRAIN SURG FLAT W7MMXL20CM FULL PERF

## (undated) DEVICE — BUR CUT RND FLUT AGRSV 4.0MM

## (undated) DEVICE — E-Z CLEAN, NON-STICK, PTFE COATED, ELECTROSURGICAL BLADE ELECTRODE, MODIFIED EXTENDED INSULATION, 4 INCH (10.2 CM): Brand: MEGADYNE

## (undated) DEVICE — GLOVE SURG SZ 9 THK91MIL LTX FREE SYN POLYISOPRENE ANTI

## (undated) DEVICE — SUTURE ETHLN SZ 3-0 L18IN NONABSORBABLE BLK FS-1 L24MM 3/8 663H

## (undated) DEVICE — SUTURE MCRYL SZ 4-0 L27IN ABSRB UD L19MM PS-2 1/2 CIR PRIM Y426H

## (undated) DEVICE — PROBE STIM 3 MM FOR PEDCL SCREW DISP

## (undated) DEVICE — HEAD POSITIONER, SURGICAL: Brand: DEROYAL

## (undated) DEVICE — Z DISCONTINUED APPLICATOR SURG PREP 0.35OZ 2% CHG 70% ISO ALC W/ HI LT

## (undated) DEVICE — GOWN,SIRUS,NONRNF,SETINSLV,XL,20/CS: Brand: MEDLINE

## (undated) DEVICE — Device

## (undated) DEVICE — GLOVE ORANGE PI 8   MSG9080

## (undated) DEVICE — CODMAN® SURGICAL PATTIES 1" X 1" (2.54CM X 2.54CM): Brand: CODMAN®